# Patient Record
Sex: FEMALE | Race: WHITE | NOT HISPANIC OR LATINO
[De-identification: names, ages, dates, MRNs, and addresses within clinical notes are randomized per-mention and may not be internally consistent; named-entity substitution may affect disease eponyms.]

---

## 2017-08-16 ENCOUNTER — RX RENEWAL (OUTPATIENT)
Age: 72
End: 2017-08-16

## 2018-03-27 ENCOUNTER — APPOINTMENT (OUTPATIENT)
Dept: NEUROLOGY | Facility: CLINIC | Age: 73
End: 2018-03-27
Payer: MEDICARE

## 2018-03-27 VITALS
BODY MASS INDEX: 36.73 KG/M2 | SYSTOLIC BLOOD PRESSURE: 159 MMHG | DIASTOLIC BLOOD PRESSURE: 94 MMHG | HEART RATE: 87 BPM | HEIGHT: 58 IN | WEIGHT: 175 LBS

## 2018-03-27 DIAGNOSIS — Z81.8 FAMILY HISTORY OF OTHER MENTAL AND BEHAVIORAL DISORDERS: ICD-10-CM

## 2018-03-27 PROCEDURE — 99205 OFFICE O/P NEW HI 60 MIN: CPT

## 2018-03-27 RX ORDER — BLOOD SUGAR DIAGNOSTIC
STRIP MISCELLANEOUS
Qty: 100 | Refills: 0 | Status: COMPLETED | COMMUNITY
Start: 2018-02-05

## 2018-03-27 RX ORDER — LANCETS 33 GAUGE
EACH MISCELLANEOUS
Qty: 100 | Refills: 0 | Status: COMPLETED | COMMUNITY
Start: 2018-02-05

## 2018-04-03 ENCOUNTER — FORM ENCOUNTER (OUTPATIENT)
Age: 73
End: 2018-04-03

## 2018-04-04 ENCOUNTER — APPOINTMENT (OUTPATIENT)
Dept: MRI IMAGING | Facility: CLINIC | Age: 73
End: 2018-04-04

## 2018-04-04 ENCOUNTER — OUTPATIENT (OUTPATIENT)
Dept: OUTPATIENT SERVICES | Facility: HOSPITAL | Age: 73
LOS: 1 days | End: 2018-04-04
Payer: MEDICARE

## 2018-04-04 DIAGNOSIS — F32.9 MAJOR DEPRESSIVE DISORDER, SINGLE EPISODE, UNSPECIFIED: ICD-10-CM

## 2018-04-04 DIAGNOSIS — G31.84 MILD COGNITIVE IMPAIRMENT OF UNCERTAIN OR UNKNOWN ETIOLOGY: ICD-10-CM

## 2018-04-04 PROCEDURE — 70551 MRI BRAIN STEM W/O DYE: CPT

## 2018-04-04 PROCEDURE — 70551 MRI BRAIN STEM W/O DYE: CPT | Mod: 26

## 2018-04-18 ENCOUNTER — APPOINTMENT (OUTPATIENT)
Dept: NEUROLOGY | Facility: CLINIC | Age: 73
End: 2018-04-18
Payer: MEDICARE

## 2018-04-18 PROCEDURE — 93886 INTRACRANIAL COMPLETE STUDY: CPT

## 2018-04-18 PROCEDURE — 93880 EXTRACRANIAL BILAT STUDY: CPT

## 2018-04-18 PROCEDURE — 93892 TCD EMBOLI DETECT W/O INJ: CPT

## 2018-05-08 ENCOUNTER — APPOINTMENT (OUTPATIENT)
Dept: NEUROLOGY | Facility: CLINIC | Age: 73
End: 2018-05-08
Payer: MEDICARE

## 2018-05-08 PROCEDURE — 96116 NUBHVL XM PHYS/QHP 1ST HR: CPT

## 2018-05-15 ENCOUNTER — APPOINTMENT (OUTPATIENT)
Dept: NEUROLOGY | Facility: CLINIC | Age: 73
End: 2018-05-15
Payer: MEDICARE

## 2018-05-15 PROCEDURE — 96118: CPT

## 2018-05-15 PROCEDURE — 96119: CPT | Mod: 59,NC

## 2018-05-25 ENCOUNTER — APPOINTMENT (OUTPATIENT)
Dept: NEUROLOGY | Facility: CLINIC | Age: 73
End: 2018-05-25

## 2018-06-29 ENCOUNTER — APPOINTMENT (OUTPATIENT)
Dept: NEUROLOGY | Facility: CLINIC | Age: 73
End: 2018-06-29
Payer: MEDICARE

## 2018-06-29 PROCEDURE — 96118: CPT

## 2018-07-23 ENCOUNTER — APPOINTMENT (OUTPATIENT)
Dept: NEUROLOGY | Facility: CLINIC | Age: 73
End: 2018-07-23
Payer: MEDICARE

## 2018-07-23 VITALS
HEART RATE: 93 BPM | HEIGHT: 60 IN | SYSTOLIC BLOOD PRESSURE: 150 MMHG | WEIGHT: 293 LBS | DIASTOLIC BLOOD PRESSURE: 75 MMHG | BODY MASS INDEX: 57.52 KG/M2

## 2018-07-23 PROCEDURE — 99214 OFFICE O/P EST MOD 30 MIN: CPT

## 2018-10-04 ENCOUNTER — RX RENEWAL (OUTPATIENT)
Age: 73
End: 2018-10-04

## 2018-10-22 ENCOUNTER — RX RENEWAL (OUTPATIENT)
Age: 73
End: 2018-10-22

## 2018-11-13 DIAGNOSIS — R55 SYNCOPE AND COLLAPSE: ICD-10-CM

## 2018-11-16 ENCOUNTER — APPOINTMENT (OUTPATIENT)
Dept: GERIATRICS | Facility: CLINIC | Age: 73
End: 2018-11-16
Payer: MEDICARE

## 2018-11-16 VITALS
DIASTOLIC BLOOD PRESSURE: 70 MMHG | OXYGEN SATURATION: 97 % | WEIGHT: 168 LBS | HEART RATE: 88 BPM | RESPIRATION RATE: 18 BRPM | BODY MASS INDEX: 32.98 KG/M2 | HEIGHT: 60 IN | SYSTOLIC BLOOD PRESSURE: 130 MMHG

## 2018-11-16 DIAGNOSIS — Z60.2 PROBLEMS RELATED TO LIVING ALONE: ICD-10-CM

## 2018-11-16 DIAGNOSIS — Z86.59 PERSONAL HISTORY OF OTHER MENTAL AND BEHAVIORAL DISORDERS: ICD-10-CM

## 2018-11-16 PROCEDURE — 99205 OFFICE O/P NEW HI 60 MIN: CPT | Mod: GC

## 2018-11-16 SDOH — SOCIAL STABILITY - SOCIAL INSECURITY: PROBLEMS RELATED TO LIVING ALONE: Z60.2

## 2018-11-19 LAB
ALBUMIN SERPL ELPH-MCNC: 4.4 G/DL
ALP BLD-CCNC: 77 U/L
ALT SERPL-CCNC: 5 U/L
ANION GAP SERPL CALC-SCNC: 17 MMOL/L
AST SERPL-CCNC: 11 U/L
BASOPHILS # BLD AUTO: 0.04 K/UL
BASOPHILS NFR BLD AUTO: 0.3 %
BILIRUB SERPL-MCNC: 0.2 MG/DL
BUN SERPL-MCNC: 29 MG/DL
CALCIUM SERPL-MCNC: 10 MG/DL
CHLORIDE SERPL-SCNC: 99 MMOL/L
CHOLEST SERPL-MCNC: 195 MG/DL
CHOLEST/HDLC SERPL: 3.8 RATIO
CO2 SERPL-SCNC: 23 MMOL/L
CREAT SERPL-MCNC: 1.09 MG/DL
EOSINOPHIL # BLD AUTO: 0.27 K/UL
EOSINOPHIL NFR BLD AUTO: 2.2 %
GLUCOSE SERPL-MCNC: 148 MG/DL
HBA1C MFR BLD HPLC: 7.2 %
HCT VFR BLD CALC: 36.4 %
HDLC SERPL-MCNC: 51 MG/DL
HGB BLD-MCNC: 11.8 G/DL
IMM GRANULOCYTES NFR BLD AUTO: 0.2 %
LDLC SERPL CALC-MCNC: 103 MG/DL
LYMPHOCYTES # BLD AUTO: 2.61 K/UL
LYMPHOCYTES NFR BLD AUTO: 21.6 %
MAN DIFF?: NORMAL
MCHC RBC-ENTMCNC: 28.9 PG
MCHC RBC-ENTMCNC: 32.4 GM/DL
MCV RBC AUTO: 89.2 FL
MONOCYTES # BLD AUTO: 1.03 K/UL
MONOCYTES NFR BLD AUTO: 8.5 %
NEUTROPHILS # BLD AUTO: 8.1 K/UL
NEUTROPHILS NFR BLD AUTO: 67.2 %
PLATELET # BLD AUTO: 395 K/UL
POTASSIUM SERPL-SCNC: 4.6 MMOL/L
PROT SERPL-MCNC: 7.8 G/DL
RBC # BLD: 4.08 M/UL
RBC # FLD: 13.3 %
SODIUM SERPL-SCNC: 139 MMOL/L
TRIGL SERPL-MCNC: 205 MG/DL
TSH SERPL-ACNC: 2.12 UIU/ML
WBC # FLD AUTO: 12.07 K/UL

## 2018-11-26 ENCOUNTER — RX RENEWAL (OUTPATIENT)
Age: 73
End: 2018-11-26

## 2018-12-10 ENCOUNTER — INPATIENT (INPATIENT)
Facility: HOSPITAL | Age: 73
LOS: 2 days | Discharge: NURSING, REHAB OR LONG-TERM CARE | End: 2018-12-13
Attending: HOSPITALIST | Admitting: HOSPITALIST
Payer: MEDICARE

## 2018-12-10 VITALS
SYSTOLIC BLOOD PRESSURE: 100 MMHG | DIASTOLIC BLOOD PRESSURE: 56 MMHG | RESPIRATION RATE: 16 BRPM | HEART RATE: 94 BPM | TEMPERATURE: 97 F | OXYGEN SATURATION: 96 %

## 2018-12-10 DIAGNOSIS — N17.9 ACUTE KIDNEY FAILURE, UNSPECIFIED: ICD-10-CM

## 2018-12-10 LAB
ALBUMIN SERPL ELPH-MCNC: 3.4 G/DL — SIGNIFICANT CHANGE UP (ref 3.3–5)
ALP SERPL-CCNC: 99 U/L — SIGNIFICANT CHANGE UP (ref 40–120)
ALT FLD-CCNC: 8 U/L — SIGNIFICANT CHANGE UP (ref 4–33)
ANISOCYTOSIS BLD QL: SLIGHT — SIGNIFICANT CHANGE UP
APPEARANCE UR: SIGNIFICANT CHANGE UP
AST SERPL-CCNC: 13 U/L — SIGNIFICANT CHANGE UP (ref 4–32)
BACTERIA # UR AUTO: NEGATIVE — SIGNIFICANT CHANGE UP
BASE EXCESS BLDV CALC-SCNC: 1.7 MMOL/L — SIGNIFICANT CHANGE UP
BASOPHILS # BLD AUTO: 0.05 K/UL — SIGNIFICANT CHANGE UP (ref 0–0.2)
BASOPHILS NFR BLD AUTO: 0.2 % — SIGNIFICANT CHANGE UP (ref 0–2)
BASOPHILS NFR SPEC: 0 % — SIGNIFICANT CHANGE UP (ref 0–2)
BILIRUB SERPL-MCNC: 0.4 MG/DL — SIGNIFICANT CHANGE UP (ref 0.2–1.2)
BILIRUB UR-MCNC: SIGNIFICANT CHANGE UP
BLASTS # FLD: 0 % — SIGNIFICANT CHANGE UP (ref 0–0)
BLOOD GAS VENOUS - CREATININE: 1.85 MG/DL — HIGH (ref 0.5–1.3)
BLOOD UR QL VISUAL: SIGNIFICANT CHANGE UP
BUN SERPL-MCNC: 65 MG/DL — HIGH (ref 7–23)
CALCIUM SERPL-MCNC: 9.8 MG/DL — SIGNIFICANT CHANGE UP (ref 8.4–10.5)
CHLORIDE BLDV-SCNC: 97 MMOL/L — SIGNIFICANT CHANGE UP (ref 96–108)
CHLORIDE SERPL-SCNC: 90 MMOL/L — LOW (ref 98–107)
CO2 SERPL-SCNC: 23 MMOL/L — SIGNIFICANT CHANGE UP (ref 22–31)
COLOR SPEC: YELLOW — SIGNIFICANT CHANGE UP
CREAT SERPL-MCNC: 1.8 MG/DL — HIGH (ref 0.5–1.3)
EOSINOPHIL # BLD AUTO: 0 K/UL — SIGNIFICANT CHANGE UP (ref 0–0.5)
EOSINOPHIL NFR BLD AUTO: 0 % — SIGNIFICANT CHANGE UP (ref 0–6)
EOSINOPHIL NFR FLD: 0 % — SIGNIFICANT CHANGE UP (ref 0–6)
GAS PNL BLDV: 133 MMOL/L — LOW (ref 136–146)
GLUCOSE BLDV-MCNC: 239 — HIGH (ref 70–99)
GLUCOSE SERPL-MCNC: 251 MG/DL — HIGH (ref 70–99)
GLUCOSE UR-MCNC: 30 — SIGNIFICANT CHANGE UP
HCO3 BLDV-SCNC: 24 MMOL/L — SIGNIFICANT CHANGE UP (ref 20–27)
HCT VFR BLD CALC: 38.7 % — SIGNIFICANT CHANGE UP (ref 34.5–45)
HCT VFR BLDV CALC: 34.6 % — SIGNIFICANT CHANGE UP (ref 34.5–45)
HGB BLD-MCNC: 12.6 G/DL — SIGNIFICANT CHANGE UP (ref 11.5–15.5)
HGB BLDV-MCNC: 11.2 G/DL — LOW (ref 11.5–15.5)
HYALINE CASTS # UR AUTO: HIGH
HYPOCHROMIA BLD QL: SLIGHT — SIGNIFICANT CHANGE UP
IMM GRANULOCYTES # BLD AUTO: 0.24 # — SIGNIFICANT CHANGE UP
IMM GRANULOCYTES NFR BLD AUTO: 1 % — SIGNIFICANT CHANGE UP (ref 0–1.5)
KETONES UR-MCNC: NEGATIVE — SIGNIFICANT CHANGE UP
LACTATE BLDV-MCNC: 2.1 MMOL/L — HIGH (ref 0.5–2)
LEUKOCYTE ESTERASE UR-ACNC: SIGNIFICANT CHANGE UP
LYMPHOCYTES # BLD AUTO: 1.29 K/UL — SIGNIFICANT CHANGE UP (ref 1–3.3)
LYMPHOCYTES # BLD AUTO: 5.3 % — LOW (ref 13–44)
LYMPHOCYTES NFR SPEC AUTO: 5.2 % — LOW (ref 13–44)
MCHC RBC-ENTMCNC: 29.2 PG — SIGNIFICANT CHANGE UP (ref 27–34)
MCHC RBC-ENTMCNC: 32.6 % — SIGNIFICANT CHANGE UP (ref 32–36)
MCV RBC AUTO: 89.6 FL — SIGNIFICANT CHANGE UP (ref 80–100)
METAMYELOCYTES # FLD: 0 % — SIGNIFICANT CHANGE UP (ref 0–1)
MONOCYTES # BLD AUTO: 1.7 K/UL — HIGH (ref 0–0.9)
MONOCYTES NFR BLD AUTO: 7 % — SIGNIFICANT CHANGE UP (ref 2–14)
MONOCYTES NFR BLD: 7.7 % — SIGNIFICANT CHANGE UP (ref 2–9)
MYELOCYTES NFR BLD: 0 % — SIGNIFICANT CHANGE UP (ref 0–0)
NEUTROPHIL AB SER-ACNC: 86.2 % — HIGH (ref 43–77)
NEUTROPHILS # BLD AUTO: 21.17 K/UL — HIGH (ref 1.8–7.4)
NEUTROPHILS NFR BLD AUTO: 86.5 % — HIGH (ref 43–77)
NEUTS BAND # BLD: 0.9 % — SIGNIFICANT CHANGE UP (ref 0–6)
NITRITE UR-MCNC: NEGATIVE — SIGNIFICANT CHANGE UP
NRBC # FLD: 0 — SIGNIFICANT CHANGE UP
OTHER - HEMATOLOGY %: 0 — SIGNIFICANT CHANGE UP
PCO2 BLDV: 49 MMHG — SIGNIFICANT CHANGE UP (ref 41–51)
PH BLDV: 7.36 PH — SIGNIFICANT CHANGE UP (ref 7.32–7.43)
PH UR: 5.5 — SIGNIFICANT CHANGE UP (ref 5–8)
PLATELET # BLD AUTO: 262 K/UL — SIGNIFICANT CHANGE UP (ref 150–400)
PLATELET COUNT - ESTIMATE: NORMAL — SIGNIFICANT CHANGE UP
PMV BLD: 11 FL — SIGNIFICANT CHANGE UP (ref 7–13)
PO2 BLDV: 28 MMHG — LOW (ref 35–40)
POLYCHROMASIA BLD QL SMEAR: SLIGHT — SIGNIFICANT CHANGE UP
POTASSIUM BLDV-SCNC: 3.8 MMOL/L — SIGNIFICANT CHANGE UP (ref 3.4–4.5)
POTASSIUM SERPL-MCNC: 4.6 MMOL/L — SIGNIFICANT CHANGE UP (ref 3.5–5.3)
POTASSIUM SERPL-SCNC: 4.6 MMOL/L — SIGNIFICANT CHANGE UP (ref 3.5–5.3)
PROMYELOCYTES # FLD: 0 % — SIGNIFICANT CHANGE UP (ref 0–0)
PROT SERPL-MCNC: 8.2 G/DL — SIGNIFICANT CHANGE UP (ref 6–8.3)
PROT UR-MCNC: 100 — HIGH
RBC # BLD: 4.32 M/UL — SIGNIFICANT CHANGE UP (ref 3.8–5.2)
RBC # FLD: 12.7 % — SIGNIFICANT CHANGE UP (ref 10.3–14.5)
RBC CASTS # UR COMP ASSIST: SIGNIFICANT CHANGE UP (ref 0–?)
SAO2 % BLDV: 39.3 % — LOW (ref 60–85)
SODIUM SERPL-SCNC: 134 MMOL/L — LOW (ref 135–145)
SP GR SPEC: 1.02 — SIGNIFICANT CHANGE UP (ref 1–1.04)
SQUAMOUS # UR AUTO: SIGNIFICANT CHANGE UP
UROBILINOGEN FLD QL: SIGNIFICANT CHANGE UP
VARIANT LYMPHS # BLD: 0 % — SIGNIFICANT CHANGE UP
WBC # BLD: 24.45 K/UL — HIGH (ref 3.8–10.5)
WBC # FLD AUTO: 24.45 K/UL — HIGH (ref 3.8–10.5)
WBC UR QL: HIGH (ref 0–?)

## 2018-12-10 PROCEDURE — 73600 X-RAY EXAM OF ANKLE: CPT | Mod: 26,RT

## 2018-12-10 PROCEDURE — 71045 X-RAY EXAM CHEST 1 VIEW: CPT | Mod: 26

## 2018-12-10 PROCEDURE — 73564 X-RAY EXAM KNEE 4 OR MORE: CPT | Mod: 26,RT

## 2018-12-10 RX ORDER — CEFTRIAXONE 500 MG/1
1 INJECTION, POWDER, FOR SOLUTION INTRAMUSCULAR; INTRAVENOUS ONCE
Qty: 0 | Refills: 0 | Status: DISCONTINUED | OUTPATIENT
Start: 2018-12-10 | End: 2018-12-10

## 2018-12-10 RX ORDER — SODIUM CHLORIDE 9 MG/ML
1000 INJECTION INTRAMUSCULAR; INTRAVENOUS; SUBCUTANEOUS ONCE
Qty: 0 | Refills: 0 | Status: COMPLETED | OUTPATIENT
Start: 2018-12-10 | End: 2018-12-10

## 2018-12-10 RX ORDER — CEFTRIAXONE 500 MG/1
1 INJECTION, POWDER, FOR SOLUTION INTRAMUSCULAR; INTRAVENOUS ONCE
Qty: 0 | Refills: 0 | Status: COMPLETED | OUTPATIENT
Start: 2018-12-10 | End: 2018-12-10

## 2018-12-10 RX ORDER — IBUPROFEN 200 MG
600 TABLET ORAL ONCE
Qty: 0 | Refills: 0 | Status: COMPLETED | OUTPATIENT
Start: 2018-12-10 | End: 2018-12-10

## 2018-12-10 RX ADMIN — SODIUM CHLORIDE 1000 MILLILITER(S): 9 INJECTION INTRAMUSCULAR; INTRAVENOUS; SUBCUTANEOUS at 21:15

## 2018-12-10 RX ADMIN — Medication 600 MILLIGRAM(S): at 20:20

## 2018-12-10 RX ADMIN — CEFTRIAXONE 100 GRAM(S): 500 INJECTION, POWDER, FOR SOLUTION INTRAMUSCULAR; INTRAVENOUS at 22:57

## 2018-12-10 RX ADMIN — Medication 600 MILLIGRAM(S): at 21:15

## 2018-12-10 NOTE — ED PROVIDER NOTE - CARE PLAN
Principal Discharge DX:	ZAHRAA (acute kidney injury) Principal Discharge DX:	ZAHRAA (acute kidney injury)  Secondary Diagnosis:	Leukocytosis

## 2018-12-10 NOTE — ED PROVIDER NOTE - OBJECTIVE STATEMENT
73 year old female with pmhx of arthritis alzheimer's HTN hypothyroidism presenting with 1 day of fatigue and difficultly walking. Per son, states that today he found patient on couch and was not able to get up due to "weakness". When he helped patient to bathroom, she states that she felt as if she was "going to faint". She denies any LOC. Also endorsing right knee pain and swelling 73 year old female with pmhx of arthritis alzheimer's HTN hypothyroidism presenting with 1 day of fatigue and difficultly walking. Per son, states that today he found patient on couch and was not able to get up due to "weakness". When he helped patient to bathroom, she states that she felt as if she was "going to faint". She denies any LOC. Also endorsing right knee and ankle pain with new recent swelling. At home she normally ambulates unassisted but recently has been having difficulty due to pain and fatigue. Son states she has not been compliant with her medications for the past few days.     1 mo ago had a possible syncopal episode for 20 seconds while sitting down where she returned to baseline without any postictal state.    Denies fevers, abd pain, CP, SOB, LOC

## 2018-12-10 NOTE — ED ADULT NURSE NOTE - OBJECTIVE STATEMENT
74 yo female BIBEMS for 10/10 right foot/knee pain and increasing weakness since this morning.  Pt is alert and oriented to time, place. but not situation.  Respirations are even and unlabored, nonambulatory upon arrival.  At home, is capable of performing ADL's.  Right knee appears swollen and tender to touch, son states she "cannot bear any weight and has had joint pain like this before." PMhx alzheimer's, HTN, DM, asthma, and anxiety.

## 2018-12-10 NOTE — ED ADULT NURSE REASSESSMENT NOTE - NS ED NURSE REASSESS COMMENT FT1
Report received from day RN. Pt resting comfortably in bed, states pain is tolerable at this time, VSS, pt stable, will continue to monitor.

## 2018-12-10 NOTE — ED PROVIDER NOTE - PROGRESS NOTE DETAILS
Suzi Vu PGY1  Ua neg, CXR neg, has elevated WBC. Has ZAHRAA  Spoke to Hospitalist who agreed to admit for evaluation

## 2018-12-10 NOTE — ED PROVIDER NOTE - MEDICAL DECISION MAKING DETAILS
73 year old female with pmhx hypothyroid presenting with 1 day of difficulty ambulating and progressive fatigue. Possible 2/2 infection vs joint effusions. Will get basic labs, urine studies, xrays of joints, and motrin for pain

## 2018-12-10 NOTE — ED PROVIDER NOTE - ATTENDING CONTRIBUTION TO CARE
73F p/w RLE pain and swelling x 1 day, having trouble walking.  Lives alone.  Has happened before, tried motrin, didn’t help so came in.  R knee anteriorly swollen, not hot or swollen.  Plan rx tylenol, check labs and urine, xrays, cane, SW eval, reassess.  Can escalate to ibuprofen if Cr OK, then perhaps oxycodone, likely worsening arthritis.    VS:  unremarkable    GEN - mild distress R knee pain; A+O x3 Smells of urine.  HEAD - NC/AT     ENT - PEERL, EOMI, mucous membranes  moist , no discharge      NECK: Neck supple, non-tender without lymphadenopathy, no masses, no JVD  PULM - CTA b/l,  symmetric breath sounds  COR -  normal heart sounds    ABD - , ND, NT, soft,  BACK - no CVA tenderness, nontender spine     EXTREMS - no edema, no deformity, warm and well perfused  except for R knee mild diffuse swelling, mild diffuse ttp, no warmth, somewhat painful ROM, R ankle mild swelling and ttp, no calf or shin swelling or ttp.   Distal N/V/T intact.    SKIN - no rash or bruising      NEUROLOGIC - alert, CN 2-12 intact, sensation nl, motor no focal deficit.

## 2018-12-11 DIAGNOSIS — M11.9 CRYSTAL ARTHROPATHY, UNSPECIFIED: ICD-10-CM

## 2018-12-11 DIAGNOSIS — E11.9 TYPE 2 DIABETES MELLITUS WITHOUT COMPLICATIONS: ICD-10-CM

## 2018-12-11 DIAGNOSIS — E11.65 TYPE 2 DIABETES MELLITUS WITH HYPERGLYCEMIA: ICD-10-CM

## 2018-12-11 DIAGNOSIS — R52 PAIN, UNSPECIFIED: ICD-10-CM

## 2018-12-11 DIAGNOSIS — R32 UNSPECIFIED URINARY INCONTINENCE: ICD-10-CM

## 2018-12-11 DIAGNOSIS — M25.561 PAIN IN RIGHT KNEE: ICD-10-CM

## 2018-12-11 DIAGNOSIS — D72.829 ELEVATED WHITE BLOOD CELL COUNT, UNSPECIFIED: ICD-10-CM

## 2018-12-11 DIAGNOSIS — Z90.49 ACQUIRED ABSENCE OF OTHER SPECIFIED PARTS OF DIGESTIVE TRACT: Chronic | ICD-10-CM

## 2018-12-11 DIAGNOSIS — I10 ESSENTIAL (PRIMARY) HYPERTENSION: ICD-10-CM

## 2018-12-11 DIAGNOSIS — A41.9 SEPSIS, UNSPECIFIED ORGANISM: ICD-10-CM

## 2018-12-11 DIAGNOSIS — E03.9 HYPOTHYROIDISM, UNSPECIFIED: ICD-10-CM

## 2018-12-11 DIAGNOSIS — G30.1 ALZHEIMER'S DISEASE WITH LATE ONSET: ICD-10-CM

## 2018-12-11 DIAGNOSIS — N17.9 ACUTE KIDNEY FAILURE, UNSPECIFIED: ICD-10-CM

## 2018-12-11 DIAGNOSIS — Z29.9 ENCOUNTER FOR PROPHYLACTIC MEASURES, UNSPECIFIED: ICD-10-CM

## 2018-12-11 LAB
APTT BLD: 24 SEC — LOW (ref 27.5–36.3)
BASOPHILS # BLD AUTO: 0.03 K/UL — SIGNIFICANT CHANGE UP (ref 0–0.2)
BASOPHILS NFR BLD AUTO: 0.2 % — SIGNIFICANT CHANGE UP (ref 0–2)
BODY FLUID TYPE: SIGNIFICANT CHANGE UP
CLARITY SPEC: SIGNIFICANT CHANGE UP
COLOR FLD: SIGNIFICANT CHANGE UP
CRP SERPL-MCNC: 233 MG/L — HIGH
CRYSTALS FLD MICRO: PRESENT — SIGNIFICANT CHANGE UP
EOSINOPHIL # BLD AUTO: 0.01 K/UL — SIGNIFICANT CHANGE UP (ref 0–0.5)
EOSINOPHIL NFR BLD AUTO: 0.1 % — SIGNIFICANT CHANGE UP (ref 0–6)
ERYTHROCYTE [SEDIMENTATION RATE] IN BLOOD: 108 MM/HR — HIGH (ref 4–25)
GRAM STN FLD: SIGNIFICANT CHANGE UP
HCT VFR BLD CALC: 31.3 % — LOW (ref 34.5–45)
HGB BLD-MCNC: 10.2 G/DL — LOW (ref 11.5–15.5)
IMM GRANULOCYTES # BLD AUTO: 0.12 # — SIGNIFICANT CHANGE UP
IMM GRANULOCYTES NFR BLD AUTO: 0.7 % — SIGNIFICANT CHANGE UP (ref 0–1.5)
INR BLD: 1.1 — SIGNIFICANT CHANGE UP (ref 0.88–1.17)
LACTATE BLDV-MCNC: 2.3 MMOL/L — HIGH (ref 0.5–2)
LYMPHOCYTES # BLD AUTO: 1.97 K/UL — SIGNIFICANT CHANGE UP (ref 1–3.3)
LYMPHOCYTES # BLD AUTO: 11.7 % — LOW (ref 13–44)
MACROPHAGES # FLD: 9 % — SIGNIFICANT CHANGE UP
MAGNESIUM SERPL-MCNC: 2.1 MG/DL — SIGNIFICANT CHANGE UP (ref 1.6–2.6)
MCHC RBC-ENTMCNC: 29.3 PG — SIGNIFICANT CHANGE UP (ref 27–34)
MCHC RBC-ENTMCNC: 32.6 % — SIGNIFICANT CHANGE UP (ref 32–36)
MCV RBC AUTO: 89.9 FL — SIGNIFICANT CHANGE UP (ref 80–100)
MONOCYTES # BLD AUTO: 1.12 K/UL — HIGH (ref 0–0.9)
MONOCYTES NFR BLD AUTO: 6.7 % — SIGNIFICANT CHANGE UP (ref 2–14)
NEUTROPHILS # BLD AUTO: 13.59 K/UL — HIGH (ref 1.8–7.4)
NEUTROPHILS NFR BLD AUTO: 80.6 % — HIGH (ref 43–77)
NEUTS SEG NFR FLD MANUAL: 91 % — SIGNIFICANT CHANGE UP
NRBC # FLD: 0 — SIGNIFICANT CHANGE UP
PHOSPHATE SERPL-MCNC: 3.3 MG/DL — SIGNIFICANT CHANGE UP (ref 2.5–4.5)
PLATELET # BLD AUTO: 272 K/UL — SIGNIFICANT CHANGE UP (ref 150–400)
PMV BLD: 10.9 FL — SIGNIFICANT CHANGE UP (ref 7–13)
PROCALCITONIN SERPL-MCNC: 1.79 NG/ML — HIGH (ref 0.02–0.1)
PROTHROM AB SERPL-ACNC: 12.3 SEC — SIGNIFICANT CHANGE UP (ref 9.8–13.1)
RBC # BLD: 3.48 M/UL — LOW (ref 3.8–5.2)
RBC # FLD: 12.9 % — SIGNIFICANT CHANGE UP (ref 10.3–14.5)
RCV VOL RI: HIGH CELL/UL (ref 0–5)
SPECIMEN SOURCE: SIGNIFICANT CHANGE UP
TOTAL CELLS COUNTED, BODY FLUID: 100 CELLS — SIGNIFICANT CHANGE UP
TOTAL NUCLEATED CELL COUNT, BODY FLUID: HIGH CELL/UL (ref 0–5)
TSH SERPL-MCNC: 1.79 UIU/ML — SIGNIFICANT CHANGE UP (ref 0.27–4.2)
URATE SERPL-MCNC: 11.9 MG/DL — HIGH (ref 2.5–7)
WBC # BLD: 16.84 K/UL — HIGH (ref 3.8–10.5)
WBC # FLD AUTO: 16.84 K/UL — HIGH (ref 3.8–10.5)

## 2018-12-11 PROCEDURE — 99223 1ST HOSP IP/OBS HIGH 75: CPT | Mod: GC

## 2018-12-11 PROCEDURE — 12345: CPT | Mod: NC,GC

## 2018-12-11 PROCEDURE — 70450 CT HEAD/BRAIN W/O DYE: CPT | Mod: 26

## 2018-12-11 RX ORDER — AZTREONAM 2 G
VIAL (EA) INJECTION
Qty: 0 | Refills: 0 | Status: DISCONTINUED | OUTPATIENT
Start: 2018-12-11 | End: 2018-12-11

## 2018-12-11 RX ORDER — DEXTROSE 50 % IN WATER 50 %
25 SYRINGE (ML) INTRAVENOUS ONCE
Qty: 0 | Refills: 0 | Status: DISCONTINUED | OUTPATIENT
Start: 2018-12-11 | End: 2018-12-11

## 2018-12-11 RX ORDER — DONEPEZIL HYDROCHLORIDE 10 MG/1
10 TABLET, FILM COATED ORAL AT BEDTIME
Qty: 0 | Refills: 0 | Status: DISCONTINUED | OUTPATIENT
Start: 2018-12-11 | End: 2018-12-11

## 2018-12-11 RX ORDER — INSULIN LISPRO 100/ML
VIAL (ML) SUBCUTANEOUS
Qty: 0 | Refills: 0 | Status: DISCONTINUED | OUTPATIENT
Start: 2018-12-11 | End: 2018-12-11

## 2018-12-11 RX ORDER — AZTREONAM 2 G
500 VIAL (EA) INJECTION ONCE
Qty: 0 | Refills: 0 | Status: COMPLETED | OUTPATIENT
Start: 2018-12-11 | End: 2018-12-11

## 2018-12-11 RX ORDER — LIDOCAINE 4 G/100G
1 CREAM TOPICAL DAILY
Qty: 0 | Refills: 0 | Status: DISCONTINUED | OUTPATIENT
Start: 2018-12-11 | End: 2018-12-13

## 2018-12-11 RX ORDER — LEVOTHYROXINE SODIUM 125 MCG
75 TABLET ORAL DAILY
Qty: 0 | Refills: 0 | Status: DISCONTINUED | OUTPATIENT
Start: 2018-12-11 | End: 2018-12-13

## 2018-12-11 RX ORDER — DEXTROSE 50 % IN WATER 50 %
15 SYRINGE (ML) INTRAVENOUS ONCE
Qty: 0 | Refills: 0 | Status: DISCONTINUED | OUTPATIENT
Start: 2018-12-11 | End: 2018-12-13

## 2018-12-11 RX ORDER — LIDOCAINE 4 G/100G
2 CREAM TOPICAL DAILY
Qty: 0 | Refills: 0 | Status: DISCONTINUED | OUTPATIENT
Start: 2018-12-11 | End: 2018-12-13

## 2018-12-11 RX ORDER — SODIUM CHLORIDE 9 MG/ML
1000 INJECTION, SOLUTION INTRAVENOUS
Qty: 0 | Refills: 0 | Status: DISCONTINUED | OUTPATIENT
Start: 2018-12-11 | End: 2018-12-11

## 2018-12-11 RX ORDER — SODIUM CHLORIDE 9 MG/ML
1000 INJECTION, SOLUTION INTRAVENOUS
Qty: 0 | Refills: 0 | Status: DISCONTINUED | OUTPATIENT
Start: 2018-12-11 | End: 2018-12-13

## 2018-12-11 RX ORDER — DONEPEZIL HYDROCHLORIDE 10 MG/1
10 TABLET, FILM COATED ORAL AT BEDTIME
Qty: 0 | Refills: 0 | Status: DISCONTINUED | OUTPATIENT
Start: 2018-12-11 | End: 2018-12-13

## 2018-12-11 RX ORDER — DEXTROSE 50 % IN WATER 50 %
15 SYRINGE (ML) INTRAVENOUS ONCE
Qty: 0 | Refills: 0 | Status: DISCONTINUED | OUTPATIENT
Start: 2018-12-11 | End: 2018-12-11

## 2018-12-11 RX ORDER — DEXTROSE 50 % IN WATER 50 %
12.5 SYRINGE (ML) INTRAVENOUS ONCE
Qty: 0 | Refills: 0 | Status: DISCONTINUED | OUTPATIENT
Start: 2018-12-11 | End: 2018-12-11

## 2018-12-11 RX ORDER — SIMVASTATIN 20 MG/1
40 TABLET, FILM COATED ORAL AT BEDTIME
Qty: 0 | Refills: 0 | Status: DISCONTINUED | OUTPATIENT
Start: 2018-12-11 | End: 2018-12-13

## 2018-12-11 RX ORDER — INSULIN LISPRO 100/ML
VIAL (ML) SUBCUTANEOUS AT BEDTIME
Qty: 0 | Refills: 0 | Status: DISCONTINUED | OUTPATIENT
Start: 2018-12-11 | End: 2018-12-13

## 2018-12-11 RX ORDER — HEPARIN SODIUM 5000 [USP'U]/ML
5000 INJECTION INTRAVENOUS; SUBCUTANEOUS EVERY 8 HOURS
Qty: 0 | Refills: 0 | Status: DISCONTINUED | OUTPATIENT
Start: 2018-12-11 | End: 2018-12-13

## 2018-12-11 RX ORDER — MIRTAZAPINE 45 MG/1
15 TABLET, ORALLY DISINTEGRATING ORAL AT BEDTIME
Qty: 0 | Refills: 0 | Status: DISCONTINUED | OUTPATIENT
Start: 2018-12-11 | End: 2018-12-11

## 2018-12-11 RX ORDER — GLUCAGON INJECTION, SOLUTION 0.5 MG/.1ML
1 INJECTION, SOLUTION SUBCUTANEOUS ONCE
Qty: 0 | Refills: 0 | Status: DISCONTINUED | OUTPATIENT
Start: 2018-12-11 | End: 2018-12-13

## 2018-12-11 RX ORDER — MIRTAZAPINE 45 MG/1
15 TABLET, ORALLY DISINTEGRATING ORAL AT BEDTIME
Qty: 0 | Refills: 0 | Status: DISCONTINUED | OUTPATIENT
Start: 2018-12-11 | End: 2018-12-13

## 2018-12-11 RX ORDER — DEXTROSE 50 % IN WATER 50 %
25 SYRINGE (ML) INTRAVENOUS ONCE
Qty: 0 | Refills: 0 | Status: DISCONTINUED | OUTPATIENT
Start: 2018-12-11 | End: 2018-12-13

## 2018-12-11 RX ORDER — QUETIAPINE FUMARATE 200 MG/1
25 TABLET, FILM COATED ORAL DAILY
Qty: 0 | Refills: 0 | Status: DISCONTINUED | OUTPATIENT
Start: 2018-12-11 | End: 2018-12-13

## 2018-12-11 RX ORDER — INSULIN LISPRO 100/ML
VIAL (ML) SUBCUTANEOUS AT BEDTIME
Qty: 0 | Refills: 0 | Status: DISCONTINUED | OUTPATIENT
Start: 2018-12-11 | End: 2018-12-11

## 2018-12-11 RX ORDER — OXYCODONE AND ACETAMINOPHEN 5; 325 MG/1; MG/1
1 TABLET ORAL EVERY 6 HOURS
Qty: 0 | Refills: 0 | Status: DISCONTINUED | OUTPATIENT
Start: 2018-12-11 | End: 2018-12-13

## 2018-12-11 RX ORDER — DEXTROSE 50 % IN WATER 50 %
12.5 SYRINGE (ML) INTRAVENOUS ONCE
Qty: 0 | Refills: 0 | Status: DISCONTINUED | OUTPATIENT
Start: 2018-12-11 | End: 2018-12-13

## 2018-12-11 RX ORDER — GLUCAGON INJECTION, SOLUTION 0.5 MG/.1ML
1 INJECTION, SOLUTION SUBCUTANEOUS ONCE
Qty: 0 | Refills: 0 | Status: DISCONTINUED | OUTPATIENT
Start: 2018-12-11 | End: 2018-12-11

## 2018-12-11 RX ORDER — INSULIN LISPRO 100/ML
VIAL (ML) SUBCUTANEOUS
Qty: 0 | Refills: 0 | Status: DISCONTINUED | OUTPATIENT
Start: 2018-12-11 | End: 2018-12-13

## 2018-12-11 RX ORDER — AZTREONAM 2 G
500 VIAL (EA) INJECTION EVERY 12 HOURS
Qty: 0 | Refills: 0 | Status: DISCONTINUED | OUTPATIENT
Start: 2018-12-11 | End: 2018-12-11

## 2018-12-11 RX ORDER — ACETAMINOPHEN 500 MG
650 TABLET ORAL EVERY 6 HOURS
Qty: 0 | Refills: 0 | Status: DISCONTINUED | OUTPATIENT
Start: 2018-12-11 | End: 2018-12-13

## 2018-12-11 RX ORDER — POTASSIUM CHLORIDE 20 MEQ
40 PACKET (EA) ORAL ONCE
Qty: 0 | Refills: 0 | Status: COMPLETED | OUTPATIENT
Start: 2018-12-11 | End: 2018-12-11

## 2018-12-11 RX ORDER — SODIUM CHLORIDE 9 MG/ML
1000 INJECTION INTRAMUSCULAR; INTRAVENOUS; SUBCUTANEOUS ONCE
Qty: 0 | Refills: 0 | Status: COMPLETED | OUTPATIENT
Start: 2018-12-11 | End: 2018-12-11

## 2018-12-11 RX ADMIN — Medication 1: at 10:24

## 2018-12-11 RX ADMIN — Medication 50 MILLIGRAM(S): at 07:46

## 2018-12-11 RX ADMIN — SIMVASTATIN 40 MILLIGRAM(S): 20 TABLET, FILM COATED ORAL at 22:07

## 2018-12-11 RX ADMIN — Medication 650 MILLIGRAM(S): at 02:58

## 2018-12-11 RX ADMIN — OXYCODONE AND ACETAMINOPHEN 1 TABLET(S): 5; 325 TABLET ORAL at 14:29

## 2018-12-11 RX ADMIN — HEPARIN SODIUM 5000 UNIT(S): 5000 INJECTION INTRAVENOUS; SUBCUTANEOUS at 13:56

## 2018-12-11 RX ADMIN — HEPARIN SODIUM 5000 UNIT(S): 5000 INJECTION INTRAVENOUS; SUBCUTANEOUS at 05:36

## 2018-12-11 RX ADMIN — SODIUM CHLORIDE 75 MILLILITER(S): 9 INJECTION, SOLUTION INTRAVENOUS at 05:36

## 2018-12-11 RX ADMIN — Medication 1: at 13:54

## 2018-12-11 RX ADMIN — HEPARIN SODIUM 5000 UNIT(S): 5000 INJECTION INTRAVENOUS; SUBCUTANEOUS at 22:08

## 2018-12-11 RX ADMIN — Medication 40 MILLIEQUIVALENT(S): at 05:36

## 2018-12-11 RX ADMIN — Medication 30 MILLIGRAM(S): at 19:18

## 2018-12-11 RX ADMIN — Medication 75 MICROGRAM(S): at 05:36

## 2018-12-11 RX ADMIN — LIDOCAINE 1 PATCH: 4 CREAM TOPICAL at 13:55

## 2018-12-11 RX ADMIN — MIRTAZAPINE 15 MILLIGRAM(S): 45 TABLET, ORALLY DISINTEGRATING ORAL at 22:08

## 2018-12-11 RX ADMIN — DONEPEZIL HYDROCHLORIDE 10 MILLIGRAM(S): 10 TABLET, FILM COATED ORAL at 22:07

## 2018-12-11 RX ADMIN — Medication 650 MILLIGRAM(S): at 03:54

## 2018-12-11 RX ADMIN — QUETIAPINE FUMARATE 25 MILLIGRAM(S): 200 TABLET, FILM COATED ORAL at 13:59

## 2018-12-11 RX ADMIN — OXYCODONE AND ACETAMINOPHEN 1 TABLET(S): 5; 325 TABLET ORAL at 13:59

## 2018-12-11 RX ADMIN — SODIUM CHLORIDE 1000 MILLILITER(S): 9 INJECTION INTRAMUSCULAR; INTRAVENOUS; SUBCUTANEOUS at 04:13

## 2018-12-11 NOTE — SWALLOW BEDSIDE ASSESSMENT ADULT - COMMENTS
73yFemale w/ r/o R septic knee, physical examination is not impressive for R knee septic arthritis. Gram stain negative. Will await results of knee arthrocentesis to make final determination.

## 2018-12-11 NOTE — H&P ADULT - PROBLEM SELECTOR PLAN 4
Hold lisinopril in setting of ZAHRAA Glucose on CMP = 251; AG = 21, FS = 130; pt on metformin at home  - hold metformin  - IVF hydration  - ISS  - Diabetic diet as tolerated  - f/u for improvement in FS, AG  - f/u HgbA1c level in the AM

## 2018-12-11 NOTE — H&P ADULT - FAMILY HISTORY
Family history of coronary artery disease in father     Sibling  Still living? Unknown  Family history of dementia, Age at diagnosis: Age Unknown     Father  Still living? Unknown  Family history of diabetes mellitus in father, Age at diagnosis: Age Unknown

## 2018-12-11 NOTE — ADVANCED PRACTICE NURSE CONSULT - RECOMMEDATIONS
Topical Recommendations:    Right Buttock- cleanse with NS, Apply LBF to periwound skin, cover with silicone foam with border. Change daily.     Continue low air loss bed therapy,  heel elevation, turn & reposition q2h, continue moisture management with barrier creams & single breathable pad, continue measures to decrease friction/shear.   Plan discussed with patient and Provider.  Questions answers.     Please contact Wound Care Service Line if we can be of further assistance (ext [phone]).

## 2018-12-11 NOTE — H&P ADULT - PROBLEM SELECTOR PLAN 5
c/w synthroid 75 mcg. c/w synthroid 75 mcg.  - TSH in AM Pt with urinary incontinence.  DDx includes stress vs neurogenic causes.  Given that pt does not have any weakness in LE, less likely neurogenic causes.  - Bladder scan to ensure pt is not retaining

## 2018-12-11 NOTE — ADVANCED PRACTICE NURSE CONSULT - REASON FOR CONSULT
Patient seen on skin care rounds after wound care referral received for assessment of skin impairment and recommendations of topical management. Chart reviewed: WBC 16.84, Hemoglobin/Hematocrit 10.2/31.3, Serum Protein 8.2g/dL, Serum albumin 3.4g/dL, Lactate 2.3mmol/L, BMI 34.4kg/m2, Arpan 16. Patient H/O of HLD, HTN, OA, Alzheimer's disease. Patient admitted with fatigue, difficulty ambulating, leukocytosis, and R knee pain. Pt being seen and followed by Orthopedics for right Knee pain.

## 2018-12-11 NOTE — H&P ADULT - PROBLEM SELECTOR PLAN 6
Tylenol prn No acute issues presently  Holding lisinopril in setting of ZAHRAA  Introduce other anti-HTN medication if/as necessary

## 2018-12-11 NOTE — H&P ADULT - PROBLEM SELECTOR PROBLEM 7
Need for prophylactic measure Late onset Alzheimer's disease without behavioral disturbance Hypothyroidism

## 2018-12-11 NOTE — PHYSICAL THERAPY INITIAL EVALUATION ADULT - PLANNED THERAPY INTERVENTIONS, PT EVAL
balance training/bed mobility training/strengthening/Patient left supine in bed in NAD; call bell in reach; +bed check; nursing staff at bedside./gait training/transfer training

## 2018-12-11 NOTE — H&P ADULT - NSHPSOCIALHISTORY_GEN_ALL_CORE
, lives alone  Denies EtOH or tobacco or drug use. , lives alone; son and daughter help patient with ADLs and IADLs.  Denies EtOH or tobacco or drug use.

## 2018-12-11 NOTE — H&P ADULT - PROBLEM SELECTOR PLAN 7
c/w simvastatin  Diabetic and DASH diet  Miladys Caicedo MD, PhD  PGY-2| Internal Medicine  [phone] / [phone] Pt with progressive dementia, followed by neurology  - c/w donezepril and mirtazepine and quetiapine  - will obtain CTH Pt with progressive dementia, followed by neurology  - c/w donezepril and mirtazepine and quetiapine  - will obtain CTH  - Social work consult placed for help for family c/w synthroid 75 mcg.  - TSH in AM

## 2018-12-11 NOTE — H&P ADULT - NSHPPHYSICALEXAM_GEN_ALL_CORE
Vital Signs Last 24 Hrs  T(C): 36.4 (11 Dec 2018 02:01), Max: 36.7 (10 Dec 2018 19:39)  T(F): 97.6 (11 Dec 2018 02:01), Max: 98.1 (10 Dec 2018 19:39)  HR: 65 (11 Dec 2018 02:01) (65 - 94)  BP: 115/54 (11 Dec 2018 02:01) (100/56 - 125/55)  BP(mean): --  RR: 17 (11 Dec 2018 02:01) (16 - 18)  SpO2: 96% (11 Dec 2018 02:01) (96% - 99%)    Appearance: Sitting in bed, NAD  HEENT:   Normal oral mucosa, PERRL, EOMI, anicteric sclera  Cardiovascular: RRR, No murmurs, gallops or rubs appreciated  Respiratory: Lungs clear to auscultation bilaterally, no wheezes, crackles appreciated  Gastrointestinal:  Soft, nondistended, nontender, +BS	  Skin: No rashes, eccymosis or cyanosis noted	  Neurologic: AOx1-2 (self, knows she's in a hospital), CNII-XII grossly intact, motor and sensory function grossly intact  Extremities: Moving all extremities equally; no weakness noted  Tenderness to palpation in lower spine  [place] neck deformity noted on bilateral hands.  Vascular: palpable dp and radial pulses 2+ bilaterally  Psych:  Pleasantly confused. Vital Signs Last 24 Hrs  T(C): 36.4 (11 Dec 2018 02:01), Max: 36.7 (10 Dec 2018 19:39)  T(F): 97.6 (11 Dec 2018 02:01), Max: 98.1 (10 Dec 2018 19:39)  HR: 65 (11 Dec 2018 02:01) (65 - 94)  BP: 115/54 (11 Dec 2018 02:01) (100/56 - 125/55)  BP(mean): --  RR: 17 (11 Dec 2018 02:01) (16 - 18)  SpO2: 96% (11 Dec 2018 02:01) (96% - 99%)    Appearance: Sitting in bed, NAD  HEENT:   Normal oral mucosa, PERRL, EOMI, anicteric sclera  Cardiovascular: RRR, No murmurs, gallops or rubs appreciated  Respiratory: Lungs clear to auscultation bilaterally, no wheezes, crackles appreciated  Gastrointestinal:  Soft, nondistended, nontender, +BS	  Skin: No rashes, eccymosis or cyanosis noted	  Neurologic: AOx1-2 (self, knows she's in a hospital), CNII-XII grossly intact, motor and sensory function grossly intact  Extremities: Moving all extremities equally; no weakness noted  Tenderness to palpation in lower spine  [place] neck deformity noted on bilateral hands.  Right knee with minimal warmth, no erythema noted.  Vascular: palpable dp and radial pulses 2+ bilaterally  Psych:  Pleasantly confused.

## 2018-12-11 NOTE — SWALLOW BEDSIDE ASSESSMENT ADULT - SWALLOW EVAL: DIAGNOSIS
Patient presents with functional oral and pharyngeal stage swallow mechanism characterized by adequate oral containment, adequate chewing for solid, bolus manipulation and transport. There is laryngeal elevation upon palpation, initiation of the pharyngeal swallow. There were no overt signs of impaired airway protection across all PO trials.

## 2018-12-11 NOTE — ADVANCED PRACTICE NURSE CONSULT - ASSESSMENT
A&Ox 2, disoriented to time and situation stating "I don't know why I am here or what's going on" (Reorientation provided),ambulatory with assistance at baseline, incontinent of urine at times. Skin warm, dry with increased moisture in intertriginous folds (sacral/gluteal folds), adequate skin turgor, scattered areas of hyperpigmentation and hypopigmentation. Ace bandage removed from right knee( site of previous synovial fluid aspiration, skin intact).     Right Buttock (within gluteal fold) Difficult Differential Diagnosis Stage II Pressure Injury vs Frictional Wound. Area measuring 2cmx1.5cmx0.2cm. Tissue presenting as 100% pink moist dermis. Scant serous drainage. Periwound noted with blanching erythema. No induration, no increased warmth. Not over bone, within gluteal folds of right buttock, unable to be seen in anatomical position, Rubbing noted within gluteal fold when patient in anatomical position. Goals of care: Protect from friction and shear, Maintain moist environment for optimal wound healing, protect periwound skin.

## 2018-12-11 NOTE — PHYSICAL THERAPY INITIAL EVALUATION ADULT - PERTINENT HX OF CURRENT PROBLEM, REHAB EVAL
73F PM H arthritis, HTN, Alzheimer's, hypothyroidism presenting with 1 day of fatigue and difficulty walking. Pt was brought in by her son as he noticed she was having difficulty walking.  Per son, pt stated she could not get up.  Denied fevers or chills.  Son states patient would stand up, walk a little and then sit right back down, complaining her legs felt weak.  CT head negative. CXR negative.

## 2018-12-11 NOTE — H&P ADULT - PROBLEM SELECTOR PROBLEM 8
Type 2 diabetes mellitus without complication, without long-term current use of insulin Late onset Alzheimer's disease without behavioral disturbance

## 2018-12-11 NOTE — H&P ADULT - PROBLEM SELECTOR PLAN 2
Pt with WBC 24, shift towards neutrophils.  Ceftriaxone given in ED.  Question of join effusion on RLE?  - will obtain procalcitonin to look for infection  - consult ortho if leukocytosis does not resolve  - consider spinal scan if WBC does not decrease to look for possible abscess. Pt with WBC 24, shift towards neutrophils.  Ceftriaxone given in ED.  Question of joint effusion on RLE?  - consult ortho for possible septic joint  - consider spinal scan if no other source found. Pt with WBC 24, shift towards neutrophils.  Ceftriaxone given in ED.  Question of joint effusion on RLE?  - consult ortho for possible septic joint  - will obtain xray of spine given pain on palpation  - hold abx for now given no fevers Pt presenting with ZAHRAA, Cr 1.80, from baseline ~ 1.  Likely in the setting of acute infection.  Given that UA is neg, unlikely UTI  - will obtain bladder scan to ensure no post obstructive nephropathy.  - will start on LR @ 75 after 1L NS bolus. (s/p 1 liter IVF in the ED)  - hold lisinopril

## 2018-12-11 NOTE — H&P ADULT - PROBLEM SELECTOR PLAN 3
Pt with urinary incontinence.  DDx includes stress vs neurogenic causes.  Give that pt does not have any weakness in LE, will r/o neurogeic causes.  - Bladder scan to ensure pt is not rettaining blood. Pt with urinary incontinence.  DDx includes stress vs neurogenic causes.  Give that pt does not have any weakness in LE, less likely neurogeic causes.  - Bladder scan to ensure pt is not rettaining blood. Pt with urinary incontinence.  DDx includes stress vs neurogenic causes.  Give that pt does not have any weakness in LE, less likely neurogeic causes.  - Bladder scan to ensure pt is not rettaining Significant pain of B/L knees with mild swelling  X-ray of R-knee significant for "Tricompartmental joint space narrowing and degenerative changes comptabile with osteoarthritis. Trace to small joint effusion."  Orthopedic consult re suspected septic arthritis  Tylenol prn mild pain  Percocet PRN moderate pain  Lidoderm patch to B/L knees  Fall precautions  Physical therapy evaluation when optimal

## 2018-12-11 NOTE — PHYSICAL THERAPY INITIAL EVALUATION ADULT - ADDITIONAL COMMENTS
Patient reports having a few steps in the front of the house to use with bilateral railings; one flight of stairs to bedroom with unilateral railings.

## 2018-12-11 NOTE — H&P ADULT - PROBLEM SELECTOR PLAN 1
Pt presenting with ZAHRAA, Cr 1.80, from baseline ~ 1.  Likely in the setting of acute infection.  Given that UA is neg, unlikely UTI  - will obtain bladder scan to ensure no post obstructive nephropathy. Pt presenting with ZAHRAA, Cr 1.80, from baseline ~ 1.  Likely in the setting of acute infection.  Given that UA is neg, unlikely UTI  - will obtain bladder scan to ensure no post obstructive nephropathy.  - will start on LR @ 75 after 1L NS bolus.  - hold lisinopril Tachycardic and with leukocytosis (24.45)  - unclear etiology at present, but may be due to one or a combination of issues septic arthritis (B/L knee swelling and w/ R-knee X-ray suggestive of osteoarthritis and trace to small joint effusion."  - CXR negative for any acute findings, but early disease may not be evident  - patient coughing/choking on ingesting medications  - orthopedic team consulted for evaluation of knees for possible septic arthritis  - has right subgluteal fold ulcer (Wound care consult in progress)  - started on ceftriaxone in the ED; will continue with aztreonam for now (since patient penicillin allergic)  - f/u cultures  - CRP, ESR in the AM  - may need ID consult  - IVF hydration  - Tylenol PRN fever

## 2018-12-11 NOTE — CONSULT NOTE ADULT - ATTENDING COMMENTS
Extremity: R knee +PF / TF crepitus, range of motion 0 - 110 degrees.  Radiographs (by report): tricompartmental arthritis R knee, chondrocalcinosis, vascular calcification  Labs: cell count 36,017 WBC; 36,000 RBC; +crystals present  GS: NOS.  Cx: NG at 24 hours.    -Recommend rheumatology assessment and management

## 2018-12-11 NOTE — CONSULT NOTE ADULT - SUBJECTIVE AND OBJECTIVE BOX
Orthopedic Surgery Consult Note    73yFemale PMH significant for HLD, HTN, OA, alzheimer's presents admitted to medicine service with fatigue, difficulty ambulating, leukocytosis, and R knee pain. Pt states that she has felt more tired than usually and her R knee has been bothering more than usual as well but is able to walk on it. Pt denies radiation of pain. Pt denies numbness, tingling, or burning in the affected extremity. Pt denies recent trauma. Pt denies recent fever/chills. No other bone/joint complaints.    PMH:  HLD (hyperlipidemia)  HTN (hypertension)  Arthritis  Alzheimer disease  Hypothyroidism    PSH:  History of appendectomy    AH:    Meds: See med rec    T(C): 36.4 (12-11-18 @ 02:01)  HR: 65 (12-11-18 @ 02:01)  BP: 115/54 (12-11-18 @ 02:01)  RR: 17 (12-11-18 @ 02:01)  SpO2: 96% (12-11-18 @ 02:01)  Wt(kg): --    PE:  Gen: NAD  RLE:   Skin intact, no soft tissue swelling, effusion, ecchymosis, erythema, or warmth of knee  + TTP about knee, decreased ROM 2/2 pain  Crepitus felt during arc of motion  Compartments soft  Motor intact GS/TA/EHL/FHL  SILT S/S/SP/DP/T, 2+ DP    Imaging:  R knee XR: Tri compartment osteoarthritis with trace knee effusion    Procedure Note:    After sterile skin preparation with 2 chlorhexidine prep sticks, an 18 gauge needle was introduced into the superolateral capsule and around 15 cc of viscous serous fluid was aspirated. Joint fluid was then sent for cell count, crystals, gram stain and culture. The knee was then cleaned and a sterile dressing and ace wrap was placed over the aspiration site. The pt tolerated the procedure well and NVI post procedure.     AP: 73yFemale w/ r/o R septic knee, physical examination is not impressive for R knee septic arthritis. Will await results of knee arthrocentesis to make final determination.   - Pain control  - WBAT on affected extremity  - FU labs, cell count, crystals, gram stain, culture  - PT/OT   - Med mgmt per primary team  - Patient should be kept NPO pending synovial fluid analysis

## 2018-12-11 NOTE — H&P ADULT - PROBLEM SELECTOR PLAN 9
c/w simvastatin  Diabetic and DASH diet  Miladys Caicedo MD, PhD  PGY-2| Internal Medicine  [phone] / [phone]

## 2018-12-11 NOTE — H&P ADULT - PROBLEM SELECTOR PROBLEM 4
HTN (hypertension) Type 2 diabetes mellitus with hyperglycemia, without long-term current use of insulin

## 2018-12-11 NOTE — H&P ADULT - PROBLEM SELECTOR PLAN 8
Pt with DM2, metformin at home  - hold metformin  - ISS  - Diabetic diet. Pt with progressive dementia, followed by neurology  - hold donepezil and mirtazepine for now (evaluate for restart)  - continuing with quetiapine  - will obtain CTH  - Social work consult placed for help for family

## 2018-12-11 NOTE — H&P ADULT - ASSESSMENT
73F PMH HTN, hypothyroidism, DM2, Alzhiemer's dementia with depression presenting with 1 day of altered mental status found to have leukocytosis and ZAHRAA. 73F PMH HTN, hypothyroidism, DM2, Alzhiemer's dementia with depression presenting with 1 day of altered mental status found to have leukocytosis and ZAHRAA, suggestive of infectious process.  DDx pulm

## 2018-12-11 NOTE — H&P ADULT - HISTORY OF PRESENT ILLNESS
73F PM H arthritis, HTN, Alzheimer's, hypothyroidism presenting with 1 day of fatigue and difficulty walking.  Pt was brought in by her son today as he noticed she was having difficulty walking.  Per son, pt stated she could not get up today.  Denied fevers or chills.  Son states patient would stand up, walk a little and then sit right back down, complaining her legs felt weak.  At some point, son states patient had an episode of urinary incontinence.  At this point, pt was brought to [facility] for further care.    In the ED, pt was 98.1, HR 90, /55, RR 18 and 99% on RA.  Pt was c/o R knee and right ankle pain so xrays were done that were neg.  Pt had blood work showing leukocytosis to 24 with left shift.  Ceftriaxone, motrin and 1L NS were given.  Pt was admitted to medicine for further care.    Pt was examined at bedside this evening.  Pt was unable to provide history and most of history was obtained from son at bedside.  Per son, pt has been having increased memory problems, deteriorating rapidly in the last few months.  At the beginning of the year, pt was still working.  Son states that 1 month ago, pt did have a episode of nonresponsiveness for 20 mintues which they were told was due to vasovagal syncope.  Son has been following with neurology and psych in order to better control medications to help with patient's memory loss. 73F PM H arthritis, HTN, Alzheimer's, hypothyroidism presenting with 1 day of fatigue and difficulty walking.  Pt is a poor historian and history was obtained from charts and son.  Pt was brought in by her son today as he noticed she was having difficulty walking.  Per son, pt stated she could not get up today.  Denied fevers or chills.  Son states patient would stand up, walk a little and then sit right back down, complaining her legs felt weak.  At some point, son states patient had an episode of urinary incontinence.  At this point, pt was brought to [facility] for further care.    In the ED, pt was 98.1, HR 90, /55, RR 18 and 99% on RA.  Pt was c/o R knee and right ankle pain so xrays were done that were neg.  Pt had blood work showing leukocytosis to 24 with left shift.  Ceftriaxone, motrin and 1L NS were given.  Pt was admitted to medicine for further care.    Pt was examined at bedside this evening. Per son, pt has been having increased memory problems, deteriorating rapidly in the last few months.  At the beginning of the year, pt was still working.  Son states that 1 month ago, pt did have a episode of nonresponsiveness for 20 mintues which they were told was due to vasovagal syncope.  Son has been following with neurology and psych in order to better control medications to help with patient's memory loss.

## 2018-12-11 NOTE — PHYSICAL THERAPY INITIAL EVALUATION ADULT - DISCHARGE DISPOSITION, PT EVAL
rehabilitation facility/to improve functional status and strength, to attain baseline functional status

## 2018-12-11 NOTE — H&P ADULT - NSHPLABSRESULTS_GEN_ALL_CORE
12-10    134<L>  |  90<L>  |  65<H>  ----------------------------<  251<H>  4.6   |  23  |  1.80<H>    Ca    9.8      10 Dec 2018 19:50    TPro  8.2  /  Alb  3.4  /  TBili  0.4  /  DBili  x   /  AST  13  /  ALT  8   /  AlkPhos  99  12-10                    Urinalysis Basic - ( 10 Dec 2018 21:08 )    Color: YELLOW / Appearance: Lt TURBID / S.023 / pH: 5.5  Gluc: 30 / Ketone: NEGATIVE  / Bili: TRACE / Urobili: TRACE   Blood: SMALL / Protein: 100 / Nitrite: NEGATIVE   Leuk Esterase: TRACE / RBC: 3-5 / WBC 6-10   Sq Epi: MODERATE / Non Sq Epi: x / Bacteria: NEGATIVE                              12.6   24.45 )-----------( 262      ( 10 Dec 2018 19:50 )             38.7     CAPILLARY BLOOD GLUCOSE      CXR:  clear  Xray knee and ankle:  No emergent findings; osteo arthritis in knee.

## 2018-12-12 LAB
ALBUMIN SERPL ELPH-MCNC: 3.2 G/DL — LOW (ref 3.3–5)
ALP SERPL-CCNC: 81 U/L — SIGNIFICANT CHANGE UP (ref 40–120)
ALT FLD-CCNC: 9 U/L — SIGNIFICANT CHANGE UP (ref 4–33)
AST SERPL-CCNC: 14 U/L — SIGNIFICANT CHANGE UP (ref 4–32)
BACTERIA UR CULT: SIGNIFICANT CHANGE UP
BASOPHILS # BLD AUTO: 0.01 K/UL — SIGNIFICANT CHANGE UP (ref 0–0.2)
BASOPHILS NFR BLD AUTO: 0.1 % — SIGNIFICANT CHANGE UP (ref 0–2)
BILIRUB SERPL-MCNC: < 0.2 MG/DL — LOW (ref 0.2–1.2)
BUN SERPL-MCNC: 44 MG/DL — HIGH (ref 7–23)
CALCIUM SERPL-MCNC: 9 MG/DL — SIGNIFICANT CHANGE UP (ref 8.4–10.5)
CHLORIDE SERPL-SCNC: 97 MMOL/L — LOW (ref 98–107)
CO2 SERPL-SCNC: 24 MMOL/L — SIGNIFICANT CHANGE UP (ref 22–31)
CREAT SERPL-MCNC: 1.08 MG/DL — SIGNIFICANT CHANGE UP (ref 0.5–1.3)
EOSINOPHIL # BLD AUTO: 0 K/UL — SIGNIFICANT CHANGE UP (ref 0–0.5)
EOSINOPHIL NFR BLD AUTO: 0 % — SIGNIFICANT CHANGE UP (ref 0–6)
GLUCOSE SERPL-MCNC: 300 MG/DL — HIGH (ref 70–99)
HBA1C BLD-MCNC: 7.5 % — HIGH (ref 4–5.6)
HCT VFR BLD CALC: 34.5 % — SIGNIFICANT CHANGE UP (ref 34.5–45)
HGB BLD-MCNC: 11 G/DL — LOW (ref 11.5–15.5)
IMM GRANULOCYTES # BLD AUTO: 0.09 # — SIGNIFICANT CHANGE UP
IMM GRANULOCYTES NFR BLD AUTO: 0.9 % — SIGNIFICANT CHANGE UP (ref 0–1.5)
LYMPHOCYTES # BLD AUTO: 0.97 K/UL — LOW (ref 1–3.3)
LYMPHOCYTES # BLD AUTO: 10.1 % — LOW (ref 13–44)
MAGNESIUM SERPL-MCNC: 2.1 MG/DL — SIGNIFICANT CHANGE UP (ref 1.6–2.6)
MCHC RBC-ENTMCNC: 28.9 PG — SIGNIFICANT CHANGE UP (ref 27–34)
MCHC RBC-ENTMCNC: 31.9 % — LOW (ref 32–36)
MCV RBC AUTO: 90.6 FL — SIGNIFICANT CHANGE UP (ref 80–100)
MONOCYTES # BLD AUTO: 0.11 K/UL — SIGNIFICANT CHANGE UP (ref 0–0.9)
MONOCYTES NFR BLD AUTO: 1.1 % — LOW (ref 2–14)
NEUTROPHILS # BLD AUTO: 8.39 K/UL — HIGH (ref 1.8–7.4)
NEUTROPHILS NFR BLD AUTO: 87.8 % — HIGH (ref 43–77)
NRBC # FLD: 0 — SIGNIFICANT CHANGE UP
PHOSPHATE SERPL-MCNC: 2.8 MG/DL — SIGNIFICANT CHANGE UP (ref 2.5–4.5)
PLATELET # BLD AUTO: 256 K/UL — SIGNIFICANT CHANGE UP (ref 150–400)
PMV BLD: 10.7 FL — SIGNIFICANT CHANGE UP (ref 7–13)
POTASSIUM SERPL-MCNC: 4.9 MMOL/L — SIGNIFICANT CHANGE UP (ref 3.5–5.3)
POTASSIUM SERPL-SCNC: 4.9 MMOL/L — SIGNIFICANT CHANGE UP (ref 3.5–5.3)
PROT SERPL-MCNC: 6.9 G/DL — SIGNIFICANT CHANGE UP (ref 6–8.3)
RBC # BLD: 3.81 M/UL — SIGNIFICANT CHANGE UP (ref 3.8–5.2)
RBC # FLD: 12.9 % — SIGNIFICANT CHANGE UP (ref 10.3–14.5)
SODIUM SERPL-SCNC: 134 MMOL/L — LOW (ref 135–145)
SPECIMEN SOURCE: SIGNIFICANT CHANGE UP
T3 SERPL-MCNC: 32.4 NG/DL — LOW (ref 80–200)
T4 AB SER-ACNC: 5.99 UG/DL — SIGNIFICANT CHANGE UP (ref 5.1–13)
T4 FREE SERPL-MCNC: 1.24 NG/DL — SIGNIFICANT CHANGE UP (ref 0.9–1.8)
TSH SERPL-MCNC: 1.32 UIU/ML — SIGNIFICANT CHANGE UP (ref 0.27–4.2)
WBC # BLD: 9.57 K/UL — SIGNIFICANT CHANGE UP (ref 3.8–10.5)
WBC # FLD AUTO: 9.57 K/UL — SIGNIFICANT CHANGE UP (ref 3.8–10.5)

## 2018-12-12 PROCEDURE — 99233 SBSQ HOSP IP/OBS HIGH 50: CPT | Mod: GC

## 2018-12-12 PROCEDURE — 72100 X-RAY EXAM L-S SPINE 2/3 VWS: CPT | Mod: 26

## 2018-12-12 PROCEDURE — 99222 1ST HOSP IP/OBS MODERATE 55: CPT

## 2018-12-12 RX ORDER — INSULIN LISPRO 100/ML
2 VIAL (ML) SUBCUTANEOUS
Qty: 0 | Refills: 0 | Status: DISCONTINUED | OUTPATIENT
Start: 2018-12-12 | End: 2018-12-13

## 2018-12-12 RX ORDER — INSULIN GLARGINE 100 [IU]/ML
4 INJECTION, SOLUTION SUBCUTANEOUS AT BEDTIME
Qty: 0 | Refills: 0 | Status: DISCONTINUED | OUTPATIENT
Start: 2018-12-12 | End: 2018-12-13

## 2018-12-12 RX ORDER — LISINOPRIL 2.5 MG/1
2.5 TABLET ORAL DAILY
Qty: 0 | Refills: 0 | Status: DISCONTINUED | OUTPATIENT
Start: 2018-12-12 | End: 2018-12-13

## 2018-12-12 RX ADMIN — Medication 1: at 22:23

## 2018-12-12 RX ADMIN — SODIUM CHLORIDE 75 MILLILITER(S): 9 INJECTION, SOLUTION INTRAVENOUS at 05:35

## 2018-12-12 RX ADMIN — MIRTAZAPINE 15 MILLIGRAM(S): 45 TABLET, ORALLY DISINTEGRATING ORAL at 21:36

## 2018-12-12 RX ADMIN — Medication 2: at 12:50

## 2018-12-12 RX ADMIN — LIDOCAINE 1 PATCH: 4 CREAM TOPICAL at 12:48

## 2018-12-12 RX ADMIN — SODIUM CHLORIDE 75 MILLILITER(S): 9 INJECTION, SOLUTION INTRAVENOUS at 20:15

## 2018-12-12 RX ADMIN — LISINOPRIL 2.5 MILLIGRAM(S): 2.5 TABLET ORAL at 18:41

## 2018-12-12 RX ADMIN — LIDOCAINE 2 PATCH: 4 CREAM TOPICAL at 12:45

## 2018-12-12 RX ADMIN — HEPARIN SODIUM 5000 UNIT(S): 5000 INJECTION INTRAVENOUS; SUBCUTANEOUS at 21:36

## 2018-12-12 RX ADMIN — LIDOCAINE 1 PATCH: 4 CREAM TOPICAL at 01:45

## 2018-12-12 RX ADMIN — SIMVASTATIN 40 MILLIGRAM(S): 20 TABLET, FILM COATED ORAL at 21:36

## 2018-12-12 RX ADMIN — LIDOCAINE 2 PATCH: 4 CREAM TOPICAL at 18:39

## 2018-12-12 RX ADMIN — LIDOCAINE 1 PATCH: 4 CREAM TOPICAL at 12:44

## 2018-12-12 RX ADMIN — HEPARIN SODIUM 5000 UNIT(S): 5000 INJECTION INTRAVENOUS; SUBCUTANEOUS at 05:35

## 2018-12-12 RX ADMIN — Medication 2 UNIT(S): at 18:29

## 2018-12-12 RX ADMIN — QUETIAPINE FUMARATE 25 MILLIGRAM(S): 200 TABLET, FILM COATED ORAL at 12:44

## 2018-12-12 RX ADMIN — LIDOCAINE 1 PATCH: 4 CREAM TOPICAL at 18:47

## 2018-12-12 RX ADMIN — Medication 4: at 18:28

## 2018-12-12 RX ADMIN — Medication 4: at 09:06

## 2018-12-12 RX ADMIN — DONEPEZIL HYDROCHLORIDE 10 MILLIGRAM(S): 10 TABLET, FILM COATED ORAL at 21:36

## 2018-12-12 RX ADMIN — LIDOCAINE 1 PATCH: 4 CREAM TOPICAL at 18:48

## 2018-12-12 RX ADMIN — Medication 75 MICROGRAM(S): at 05:35

## 2018-12-12 RX ADMIN — HEPARIN SODIUM 5000 UNIT(S): 5000 INJECTION INTRAVENOUS; SUBCUTANEOUS at 13:43

## 2018-12-12 RX ADMIN — Medication 30 MILLIGRAM(S): at 12:43

## 2018-12-12 RX ADMIN — INSULIN GLARGINE 4 UNIT(S): 100 INJECTION, SOLUTION SUBCUTANEOUS at 22:27

## 2018-12-12 NOTE — PROGRESS NOTE ADULT - PROBLEM SELECTOR PROBLEM 6
Late onset Alzheimer's disease without behavioral disturbance
Late onset Alzheimer's disease without behavioral disturbance

## 2018-12-12 NOTE — PROGRESS NOTE ADULT - PROBLEM SELECTOR PLAN 7
c/w simvastatin  SQH  Diabetic and DASH diet  PT  Dispo: pt and pt's son agreeable to [facility] (recommended by PT). pt's son would prefer a [facility] closer to [place] (where they live) but are amenable to other rehab locations around [facility].
c/w simvastatin  SQH  Diabetic and DASH diet

## 2018-12-12 NOTE — PROGRESS NOTE ADULT - PROBLEM SELECTOR PLAN 6
Pt with progressive dementia, followed by neurology  - c/w donepezil and mirtazepine   - continuing with quetiapine  - CT with no acute pathology  - Social work consult placed for help for family
Pt with progressive dementia, followed by neurology  - c/w donepezil and mirtazepine   - continuing with quetiapine  - f/u CTH  - Social work consult placed for help for family

## 2018-12-13 ENCOUNTER — TRANSCRIPTION ENCOUNTER (OUTPATIENT)
Age: 73
End: 2018-12-13

## 2018-12-13 VITALS
HEART RATE: 72 BPM | RESPIRATION RATE: 19 BRPM | OXYGEN SATURATION: 98 % | TEMPERATURE: 98 F | SYSTOLIC BLOOD PRESSURE: 133 MMHG | DIASTOLIC BLOOD PRESSURE: 58 MMHG

## 2018-12-13 LAB
BUN SERPL-MCNC: 30 MG/DL — HIGH (ref 7–23)
CALCIUM SERPL-MCNC: 8.5 MG/DL — SIGNIFICANT CHANGE UP (ref 8.4–10.5)
CHLORIDE SERPL-SCNC: 102 MMOL/L — SIGNIFICANT CHANGE UP (ref 98–107)
CO2 SERPL-SCNC: 24 MMOL/L — SIGNIFICANT CHANGE UP (ref 22–31)
CREAT SERPL-MCNC: 0.96 MG/DL — SIGNIFICANT CHANGE UP (ref 0.5–1.3)
GLUCOSE SERPL-MCNC: 212 MG/DL — HIGH (ref 70–99)
HCT VFR BLD CALC: 29.3 % — LOW (ref 34.5–45)
HGB BLD-MCNC: 9.4 G/DL — LOW (ref 11.5–15.5)
MCHC RBC-ENTMCNC: 29.2 PG — SIGNIFICANT CHANGE UP (ref 27–34)
MCHC RBC-ENTMCNC: 32.1 % — SIGNIFICANT CHANGE UP (ref 32–36)
MCV RBC AUTO: 91 FL — SIGNIFICANT CHANGE UP (ref 80–100)
NRBC # FLD: 0 — SIGNIFICANT CHANGE UP
PLATELET # BLD AUTO: 300 K/UL — SIGNIFICANT CHANGE UP (ref 150–400)
PMV BLD: 10.6 FL — SIGNIFICANT CHANGE UP (ref 7–13)
POTASSIUM SERPL-MCNC: 4.3 MMOL/L — SIGNIFICANT CHANGE UP (ref 3.5–5.3)
POTASSIUM SERPL-SCNC: 4.3 MMOL/L — SIGNIFICANT CHANGE UP (ref 3.5–5.3)
RBC # BLD: 3.22 M/UL — LOW (ref 3.8–5.2)
RBC # FLD: 12.9 % — SIGNIFICANT CHANGE UP (ref 10.3–14.5)
SODIUM SERPL-SCNC: 136 MMOL/L — SIGNIFICANT CHANGE UP (ref 135–145)
WBC # BLD: 12.27 K/UL — HIGH (ref 3.8–10.5)
WBC # FLD AUTO: 12.27 K/UL — HIGH (ref 3.8–10.5)

## 2018-12-13 PROCEDURE — 99239 HOSP IP/OBS DSCHRG MGMT >30: CPT

## 2018-12-13 RX ORDER — INSULIN GLARGINE 100 [IU]/ML
8 INJECTION, SOLUTION SUBCUTANEOUS
Qty: 0 | Refills: 0 | DISCHARGE
Start: 2018-12-13

## 2018-12-13 RX ORDER — ALLOPURINOL 300 MG
1 TABLET ORAL
Qty: 30 | Refills: 0 | OUTPATIENT
Start: 2018-12-13

## 2018-12-13 RX ORDER — INSULIN GLARGINE 100 [IU]/ML
8 INJECTION, SOLUTION SUBCUTANEOUS AT BEDTIME
Qty: 0 | Refills: 0 | Status: DISCONTINUED | OUTPATIENT
Start: 2018-12-13 | End: 2018-12-13

## 2018-12-13 RX ORDER — INSULIN LISPRO 100/ML
4 VIAL (ML) SUBCUTANEOUS
Qty: 0 | Refills: 0 | Status: DISCONTINUED | OUTPATIENT
Start: 2018-12-13 | End: 2018-12-13

## 2018-12-13 RX ADMIN — Medication 30 MILLIGRAM(S): at 05:50

## 2018-12-13 RX ADMIN — Medication 75 MICROGRAM(S): at 05:50

## 2018-12-13 RX ADMIN — LISINOPRIL 2.5 MILLIGRAM(S): 2.5 TABLET ORAL at 05:50

## 2018-12-13 RX ADMIN — LIDOCAINE 1 PATCH: 4 CREAM TOPICAL at 13:25

## 2018-12-13 RX ADMIN — LIDOCAINE 1 PATCH: 4 CREAM TOPICAL at 00:21

## 2018-12-13 RX ADMIN — Medication 2 UNIT(S): at 09:08

## 2018-12-13 RX ADMIN — Medication 2 UNIT(S): at 13:27

## 2018-12-13 RX ADMIN — HEPARIN SODIUM 5000 UNIT(S): 5000 INJECTION INTRAVENOUS; SUBCUTANEOUS at 13:27

## 2018-12-13 RX ADMIN — LIDOCAINE 1 PATCH: 4 CREAM TOPICAL at 13:26

## 2018-12-13 RX ADMIN — Medication 2: at 09:08

## 2018-12-13 RX ADMIN — SODIUM CHLORIDE 75 MILLILITER(S): 9 INJECTION, SOLUTION INTRAVENOUS at 05:55

## 2018-12-13 RX ADMIN — HEPARIN SODIUM 5000 UNIT(S): 5000 INJECTION INTRAVENOUS; SUBCUTANEOUS at 05:50

## 2018-12-13 RX ADMIN — QUETIAPINE FUMARATE 25 MILLIGRAM(S): 200 TABLET, FILM COATED ORAL at 13:25

## 2018-12-13 RX ADMIN — LIDOCAINE 2 PATCH: 4 CREAM TOPICAL at 00:20

## 2018-12-13 RX ADMIN — Medication 3: at 13:27

## 2018-12-13 NOTE — DISCHARGE NOTE ADULT - CARE PLAN
Principal Discharge DX:	Gout flare  Goal:	Continue steroid taper as prescribed  Assessment and plan of treatment:	You were hospitalized for severe pain in the right knee making if difficult for you to ambulate. Fluid was removed from your right knee joint and tested negative for infection but contained uric acid crystals indicative of gout. You were treated for a flare of your known gouty arthritis with IV and then oral steroids. Please continue to take prednisone as prescribed to reduce inflammation in your right knee. Please take allopurinol which helps to lower blood levels of uric acid and decreases formation of uric acid crystals.  Secondary Diagnosis:	Hyperglycemia, drug-induced  Goal:	Continue basal bolus insulin as needed  Assessment and plan of treatment:	You are being treated with steroids for an acute flare of gout which have raised your blood sugar levels. You required insulin injections in the hospital to lower you blood sugar. Please continue insulin as needed to maintain your blood sugars within the normal range as long as you are taking the steroids.  Secondary Diagnosis:	Impaired mobility  Goal:	Got to subacute rehab  Assessment and plan of treatment:	You have having impaired mobility within your home due to your gouty arthritis and joint degeneration. Please go to subacute rehab for muscle strengthening and overall improve conditioning. Principal Discharge DX:	Gout flare  Goal:	Continue steroid taper as prescribed  Assessment and plan of treatment:	You were hospitalized for severe pain in the right knee making if difficult for you to ambulate. Fluid was removed from your right knee joint and tested negative for infection but contained uric acid crystals indicative of gout. You were treated for a flare of your known gouty arthritis with IV and then oral steroids. Please continue to take prednisone as prescribed to reduce inflammation in your right knee. Please take allopurinol which helps to lower blood levels of uric acid and decreases formation of uric acid crystals.  Secondary Diagnosis:	Hyperglycemia, drug-induced  Goal:	Continue basal bolus insulin as needed  Assessment and plan of treatment:	You are being treated with steroids for an acute flare of gout which have raised your blood sugar levels. You required insulin injections in the hospital to lower you blood sugar. Please continue insulin as needed to maintain your blood sugars within the normal range as long as you are taking the steroids.  Secondary Diagnosis:	Impaired mobility  Goal:	Got to subacute rehab  Assessment and plan of treatment:	You have having impaired mobility within your home due to your gouty arthritis and joint degeneration. Please go to subacute rehab for muscle strengthening and overall improve conditioning.  Secondary Diagnosis:	ZAHRAA (acute kidney injury)  Goal:	Monitoring  Assessment and plan of treatment:	Your kidney function was decreased at the time of your hospitalization. Your kidney function has since improved to normal with intravenous fluids. Principal Discharge DX:	Gout flare  Goal:	Continue steroid taper as prescribed  Assessment and plan of treatment:	You were hospitalized for severe pain in the right knee making if difficult for you to ambulate. Fluid was removed from your right knee joint and tested negative for infection but contained uric acid crystals indicative of gout. You were treated for a flare of your known gouty arthritis with IV and then oral steroids. Please continue to take prednisone as prescribed to reduce inflammation in your right knee. Please follow up with your primary medical doctor to discuss starting urate-lowering therapy such as allopurinol which helps to lower blood levels of uric acid and decreases formation/deposition of uric acid crystals in the joints.  Secondary Diagnosis:	Hyperglycemia, drug-induced  Goal:	Continue basal bolus insulin as needed  Assessment and plan of treatment:	You are being treated with steroids for an acute flare of gout which have raised your blood sugar levels. You required insulin injections in the hospital to lower you blood sugar. Please continue insulin as needed to maintain your blood sugars within the normal range as long as you are taking the steroids.  Secondary Diagnosis:	Impaired mobility  Goal:	Got to subacute rehab  Assessment and plan of treatment:	You have having impaired mobility within your home due to your gouty arthritis and joint degeneration. Please go to subacute rehab for muscle strengthening and overall improve conditioning.  Secondary Diagnosis:	ZAHRAA (acute kidney injury)  Goal:	Monitoring  Assessment and plan of treatment:	Your kidney function was decreased at the time of your hospitalization. Your kidney function has since improved to normal with intravenous fluids.

## 2018-12-13 NOTE — PROGRESS NOTE ADULT - PROBLEM SELECTOR PLAN 5
C/f hypothyroidism contributing to memory loss / confusion / slowed mentation.   - TSH wnl, free T4 wnl  - c/w synthroid 75 mcg.
C/f hypothyroidism contributing to memory loss / confusion / slowed mentation.  - c/w synthroid 75 mcg.  - f/u TSH, T3, free T4
c/w simvastatin  SQH  Diabetic and DASH diet  PT  Dispo: pt and pt's son agreeable to [facility] (recommended by PT). pt's son would prefer a [facility] closer to [place] (where they live) but are amenable to other rehab locations around [facility].

## 2018-12-13 NOTE — PROGRESS NOTE ADULT - PROBLEM SELECTOR PROBLEM 4
Urinary incontinence
Late onset Alzheimer's disease without behavioral disturbance
Urinary incontinence

## 2018-12-13 NOTE — DISCHARGE NOTE ADULT - CONDITIONS AT DISCHARGE
Patient is alert , oriented x 3with periods of confusion; both Knees are swollen 1+ edema ; glucose is stable in the 200's partial to complete assistance for her daily care; walks with one person assistances.  She does have a Right Buttock Fold skin Tear with open to air.  She has no additional  complaints at this time. The  Summary is sent with her to the Facility.

## 2018-12-13 NOTE — PROGRESS NOTE ADULT - PROBLEM SELECTOR PROBLEM 2
ZAHRAA (acute kidney injury)
Type 2 diabetes mellitus with hyperglycemia, without long-term current use of insulin
ZAHRAA (acute kidney injury)

## 2018-12-13 NOTE — PROGRESS NOTE ADULT - PROBLEM SELECTOR PLAN 1
Pt p/w knee pain, difficulty walking, found to be tachycardic, labs notable for leukocytosis to 34.45 on admission.  - S/p R knee tap. 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   - was started on solumedrol IV 30mg for crystal arthropathy, now transitioning to PO prednisone taper  - holding off on NSAIDs for now, given age and risks of adverse effects
Pt p/w knee pain, difficulty walking, found to be tachycardic, labs notable for leukocytosis to 34.45 on admission.  - X-ray of R-knee significant for "Tricompartmental joint space narrowing and degenerative changes compatible with osteoarthritis. Trace to small joint effusion."  - S/p R knee tap. 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   - Septic joint lower on differential, as synovial fluid gram stain negative for organisms. Antibiotics d/c'd for now, but will f/u fluid culture results  - solumedrol IV 30mg for crystal arthropathy.   - holding off on NSAIDs for now, given ZAHRAA
Pt p/w knee pain, difficulty walking, found to be tachycardic, labs notable for leukocytosis to 34.45 on admission. S/p R knee tap. 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   - Transitioned from IV steroids to PO prednisone taper. Pain improved since starting steroids. Pt with expected glucocorticoid-induced leukocytosis and hyperglycemia; will continue to monitor and adjust insulin.  - holding off on NSAIDs for now, given age and risks of adverse effects  - per son, pt has never been formally diagnosed with gout in the past, although she has had similar (less severe) episodes of polyarticular joint pain suspicious for crystal arthropathy. Pt has never been on a uric acid lowering agent

## 2018-12-13 NOTE — DISCHARGE NOTE ADULT - MEDICATION SUMMARY - MEDICATIONS TO TAKE
I will START or STAY ON the medications listed below when I get home from the hospital:    predniSONE  -- 20 milligram(s) by mouth once on 12/14  10 milligram(s) by mouth once on 12/15  10 milligram(s) by mouth once on 12/16  5 milligram(s) by mouth once on 12/17  -- Indication: For Crystal arthropathy    lisinopril 2.5 mg oral tablet  -- 1 tab(s) by mouth once a day  -- Indication: For HTN (hypertension)    mirtazapine 15 mg oral tablet  -- 1 tab(s) by mouth once a day (at bedtime)  -- Indication: For Late onset Alzheimer's disease without behavioral disturbance    insulin glargine  -- 8 unit(s) subcutaneous once a day (at bedtime)  -- Indication: For Hyperglycemia, drug-induced    metFORMIN 500 mg oral tablet, extended release  -- 1 tab(s) by mouth once a day  -- Indication: For Type 2 diabetes mellitus with hyperglycemia, without long-term current use of insulin    simvastatin 40 mg oral tablet  -- 1 tab(s) by mouth once a day (at bedtime)  -- Indication: For HLD (hyperlipidemia)    QUEtiapine 25 mg oral tablet  -- 1 tab(s) by mouth once a day  -- Indication: For Late onset Alzheimer's disease without behavioral disturbance    donepezil 10 mg oral tablet  -- 1 tab(s) by mouth once a day (at bedtime)  -- Indication: For Late onset Alzheimer's disease without behavioral disturbance    levothyroxine 75 mcg (0.075 mg) oral tablet  -- 1 tab(s) by mouth once a day  -- Indication: For Hypothyroidism

## 2018-12-13 NOTE — DISCHARGE NOTE ADULT - HOSPITAL COURSE
Patient is a 73 year old female with gouty arthritis, HTN, Alzheimer's dementia and hypothyroidism who presented for fatigue and difficulty walking/weight-bearing for one day. Vitals stable on presentation. Laboratory studies revealed leukocytosis. Right knee x-ray was ordered for complaint of right knee pain and swelling however revealed no acute pathology. Right knee effusion was appreciated on exam and aspirated by Orthopaedic surgery in the ED. Patient received ceftriaxone and Solu-Medrol 125 IV prior to transfer to Medicine floor. Synovial fluid studies were consistent with gout and oral prednisone taper was started. Patient remained Patient is a 73 year old female with gouty arthritis, HTN, Alzheimer's dementia and hypothyroidism who presented for fatigue and difficulty walking/weight-bearing for one day. Vitals stable on presentation. Laboratory studies revealed leukocytosis and elevated serum creatinine. Right knee x-ray was ordered for complaint of right knee pain and swelling however revealed no acute pathology. Right knee effusion was appreciated on exam and aspirated by Orthopaedic surgery in the ED. Patient received ceftriaxone and Solu-Medrol 125 IV prior to transfer to Medicine floor. Synovial fluid studies were consistent with gout and oral prednisone taper was started. Acute kidney injury resolved with IV fluids. Right knee pain and swelling improved and patient was able to participate in Physical Therapy evaluation. Subacute rehabilitation ([facility]) was recommended given impaired mobility. Patient was discharged on prednisone taper (4 days remaining) and uric acid-lowering therapy and advised to follow up with primary medical doctor upon discharge from [facility] for further monitoring of gouty arthritis.

## 2018-12-13 NOTE — PROGRESS NOTE ADULT - ATTENDING COMMENTS
Pt seen and examined, chart and labs reviewed.  Pt feels well, able to ambulate with less difficulty, denies pain in joints.  Pt aware of arrangement of [facility] and in agreement.   Pt medically stable for d/c once [facility] arranged.   Pt will need strict FSG monitoring while on steroid taper, pt will need PMD f/u to initiate urate lowering therapy.
Pt seen and examined, chart and labs reviewed.  Agree with PGY-1 note as above. Pt unreliable historian, remains confused, doesn't know where she is or why she's here.  Pt now s/p arthrocentesis, consistent with crystal arthropathy, crystal analysis still ongoing.  Will give Solumedrol 30mg IV x1 today.  Low suspicion of septic joint, so far fluid cultures negative, remains afebrile.

## 2018-12-13 NOTE — PROGRESS NOTE ADULT - PROBLEM SELECTOR PLAN 2
Likely pre-renal.  Improved with hydration.  - restart home lisinopril, given recovery in kidney function and recent BPs  - trend Cr
Pt on metformin at home. Now with glucocorticoid-induced hyperglycemia.  - hold metformin  - ISS  - Lantus and Humalog added  - CTM FSs  - Diabetic diet as tolerated
Pt presenting with ZAHRAA, Cr 1.80, from baseline ~ 1.  Likely pre-renal. Given that UA is neg, unlikely UTI  - will obtain bladder scan to ensure no post obstructive nephropathy.  - c/w LR @ 75 after 1L NS bolus. (s/p 1 liter IVF in the ED)  - hold lisinopril  - trend Cr  - hold NSAIDs for crystal arthropathy for now, pending recovery in renal function

## 2018-12-13 NOTE — DISCHARGE NOTE ADULT - PATIENT PORTAL LINK FT
You can access the [facility] Patient Portal, offered by [facility], by registering with the following website: [email or web address]

## 2018-12-13 NOTE — DISCHARGE NOTE ADULT - PLAN OF CARE
Continue steroid taper as prescribed You were hospitalized for severe pain in the right knee making if difficult for you to ambulate. Fluid was removed from your right knee joint and tested negative for infection but contained uric acid crystals indicative of gout. You were treated for a flare of your known gouty arthritis with IV and then oral steroids. Please continue to take prednisone as prescribed to reduce inflammation in your right knee. Please take allopurinol which helps to lower blood levels of uric acid and decreases formation of uric acid crystals. Continue basal bolus insulin as needed You are being treated with steroids for an acute flare of gout which have raised your blood sugar levels. You required insulin injections in the hospital to lower you blood sugar. Please continue insulin as needed to maintain your blood sugars within the normal range as long as you are taking the steroids. Got to subacute rehab You have having impaired mobility within your home due to your gouty arthritis and joint degeneration. Please go to subacute rehab for muscle strengthening and overall improve conditioning. Monitoring Your kidney function was decreased at the time of your hospitalization. Your kidney function has since improved to normal with intravenous fluids. You were hospitalized for severe pain in the right knee making if difficult for you to ambulate. Fluid was removed from your right knee joint and tested negative for infection but contained uric acid crystals indicative of gout. You were treated for a flare of your known gouty arthritis with IV and then oral steroids. Please continue to take prednisone as prescribed to reduce inflammation in your right knee. Please follow up with your primary medical doctor to discuss starting urate-lowering therapy such as allopurinol which helps to lower blood levels of uric acid and decreases formation/deposition of uric acid crystals in the joints.

## 2018-12-13 NOTE — PROGRESS NOTE ADULT - PROBLEM SELECTOR PLAN 3
Pt on metformin at home. Now with glucocorticoid-induced hyperglycemia.  - hold metformin  - ISS  - Lantus and Humalog added  - CTM FSs  - Diabetic diet as tolerated
C/f hypothyroidism contributing to memory loss / confusion / slowed mentation.   - TSH wnl, free T4 wnl  - c/w synthroid 75 mcg.
Glucose on CMP = 251; AG = 21, FS = 130; pt on metformin at home  - hold metformin  - IVF hydration  - ISS  - Diabetic diet as tolerated  - f/u for improvement in FS, AG  - f/u HgbA1c level in the AM

## 2018-12-13 NOTE — PROGRESS NOTE ADULT - ASSESSMENT
73F, PMH HTN, hypothyroidism, DM2, Alzhiemer's dementia with depression, presenting with AMS, urinary incontinence, and 1 day of difficulty ambulating. R knee found to have effusion, which was tapped. Synovial fluid analysis suggestive of crystal arthropathy.
73F, PMH HTN, hypothyroidism, DM2, Alzhiemer's dementia with depression, presenting with AMS, urinary incontinence, and 1 day of difficulty ambulating. Synovial fluid analysis from R knee suggestive of crystal arthropathy. Improving on steroids.
73F, PMH HTN, hypothyroidism, DM2, Alzhiemer's dementia with depression, presenting with AMS, urinary incontinence, and 1 day of difficulty ambulating. Synovial fluid analysis from R knee suggestive of crystal arthropathy. Improving on steroids.

## 2018-12-13 NOTE — PROGRESS NOTE ADULT - PROBLEM SELECTOR PLAN 4
Pt with urinary incontinence.  DDx includes stress vs NPH vs dementia.  Given that pt does not have any weakness in LE, less likely neurogenic causes.  - CTH negative for signs of NPH  - Per son, pt has had gradually worsening bowel and bladder incontinence over long period of time  - Given hx per son and normal Head CT, urinary incontinence most likely part of a constellation of symptoms of pt's dementia progressing over time
Pt with progressive dementia, followed by neurology  - c/w donepezil and mirtazepine   - continuing with quetiapine  - CT with no acute pathology  - Social work consult placed for help for family
Pt with urinary incontinence.  DDx includes stress vs NPH vs dementia.  Given that pt does not have any weakness in LE, less likely neurogenic causes.  - Bladder scan to ensure pt is not retaining  - f/u CTH for signs of NPH

## 2018-12-13 NOTE — PROGRESS NOTE ADULT - REASON FOR ADMISSION
Leukocytosis and ZAHRAA

## 2018-12-13 NOTE — PROGRESS NOTE ADULT - SUBJECTIVE AND OBJECTIVE BOX
Ortho Progress Note    S: Patient seen and examined. No acute events overnight. Pain well controlled. Denies fevers/chills/N/V.   States knee pain has improved.    O:  Physical Exam:  Gen: Laying in bed, NAD, alert and oriented.   Resp: Unlabored breathing  R Ext: EHL/FHL/TA/Sol intact          + SILT DP/SP/JACQUES/Sa          +DP, extremity WWP  Ranging R knee 0-100 deg with minimal pain. Crepitus with ROM. Mild knee effusion. No erythema or deformities.    Vital Signs Last 24 Hrs  T(C): 36.4 (11 Dec 2018 05:30), Max: 36.7 (10 Dec 2018 19:39)  T(F): 97.5 (11 Dec 2018 05:30), Max: 98.1 (10 Dec 2018 19:39)  HR: 70 (11 Dec 2018 05:30) (65 - 94)  BP: 118/61 (11 Dec 2018 05:30) (100/56 - 125/55)  BP(mean): --  RR: 17 (11 Dec 2018 05:30) (16 - 18)  SpO2: 97% (11 Dec 2018 05:30) (96% - 99%)                          12.6   24.45 )-----------( 262      ( 10 Dec 2018 19:50 )             38.7       12-10    134<L>  |  90<L>  |  65<H>  ----------------------------<  251<H>  4.6   |  23  |  1.80<H>      Gram stain negative.      A/P  73yFemale w/ r/o R septic knee, physical examination is not impressive for R knee septic arthritis. Gram stain negative. Will await results of knee arthrocentesis to make final determination.     - Gram stain negative  - Pain control  - WBAT on affected extremity  - FU labs, cell count, crystals, culture  - PT/OT   - Med mgmt per primary team  - Patient should be kept NPO pending synovial fluid analysis
Patient is a 73y old  Female who presents with a chief complaint of Leukocytosis and ZAHRAA (11 Dec 2018 06:13)     INTERVAL HPI/OVERNIGHT EVENTS: No acute events overnight. This am, pt says she feels well. Cannot recall events which led to her hospitalization. Cannot recall events in the ED or recent procedures, such as synovial fluid tap.   Denies fevers at home. Denies headache, vision problems, balance problems, chest pain, SOB, abd pain, nausea, vomiting, diarrhea, leg pain, knee pain, difficulty walking, bowel or bladder incontinence.  Patient may not be a reliable historian.    REVIEW OF SYSTEMS:  see above    MEDICATIONS  (STANDING):  aztreonam  IVPB      aztreonam  IVPB 500 milliGRAM(s) IV Intermittent every 12 hours  dextrose 5%. 1000 milliLiter(s) (50 mL/Hr) IV Continuous <Continuous>  dextrose 50% Injectable 12.5 Gram(s) IV Push once  dextrose 50% Injectable 25 Gram(s) IV Push once  dextrose 50% Injectable 25 Gram(s) IV Push once  heparin  Injectable 5000 Unit(s) SubCutaneous every 8 hours  insulin lispro (HumaLOG) corrective regimen sliding scale   SubCutaneous three times a day before meals  insulin lispro (HumaLOG) corrective regimen sliding scale   SubCutaneous at bedtime  lactated ringers. 1000 milliLiter(s) (75 mL/Hr) IV Continuous <Continuous>  levothyroxine 75 MICROGram(s) Oral daily  lidocaine   Patch 1 Patch Transdermal daily  lidocaine   Patch 1 Patch Transdermal daily  QUEtiapine 25 milliGRAM(s) Oral daily  simvastatin 40 milliGRAM(s) Oral at bedtime    MEDICATIONS  (PRN):  acetaminophen   Tablet .. 650 milliGRAM(s) Oral every 6 hours PRN Temp greater or equal to 38C (100.4F), Mild Pain (1 - 3), Moderate Pain (4 - 6)  dextrose 40% Gel 15 Gram(s) Oral once PRN Blood Glucose LESS THAN 70 milliGRAM(s)/deciliter  glucagon  Injectable 1 milliGRAM(s) IntraMuscular once PRN Glucose LESS THAN 70 milligrams/deciliter  oxyCODONE    5 mG/acetaminophen 325 mG 1 Tablet(s) Oral every 6 hours PRN Moderate Pain (4 - 6)    Allergies:  eggs (Unknown)  penicillins (Unknown)      VITAL SIGNS:  T(C): 36.4 (12-11-18 @ 05:30), Max: 36.7 (12-10-18 @ 19:39)  HR: 70 (12-11-18 @ 05:30) (65 - 94)  BP: 118/61 (12-11-18 @ 05:30) (100/56 - 125/55)  RR: 17 (12-11-18 @ 05:30) (16 - 18)  SpO2: 97% (12-11-18 @ 05:30) (96% - 99%)    PHYSICAL EXAM:  General: NAD.  Neuro: AAOx0-1. Confused, forgetful  HEENT: Normocephalic, atraumatic. EOMI. PERRL. Conjunctiva and sclera clear. Mucous membranes moist, without lesions. Neck supple. No JVD. Soft / rubbery nontender mass on anterior neck, easily palpable.  Resp: Non-labored breathing on room air. Clear to auscultation bilaterally; no wheeze  Cardiovascular: Regular rate and rhythm; no murmurs  Abd: +BS, soft, nontender, obese but nondistended  Ext:  No LE edema. R knee mildly swollen and mildly warm to touch, but not erythematous and nontender to palpation  Skin: Warm, dry. Superficial erosion in gluteal fold    LABS:                        10.2   16.84 )-----------( 272      ( 11 Dec 2018 05:00 )             31.3     10 Dec 2018 19:50    134    |  90     |  65     ----------------------------<  251    4.6     |  23     |  1.80     Ca    9.8        10 Dec 2018 19:50  Phos  3.3       11 Dec 2018 05:00  Mg     2.1       11 Dec 2018 05:00    TPro  8.2    /  Alb  3.4    /  TBili  0.4    /  DBili  x      /  AST  13     /  ALT  8      /  AlkPhos  99     10 Dec 2018 19:50  PT/INR - ( 11 Dec 2018 05:00 )   PT: 12.3 SEC;   INR: 1.10          PTT - ( 11 Dec 2018 05:00 )  PTT:24.0 SEC  CAPILLARY BLOOD GLUCOSE      POCT Blood Glucose.: 180 mg/dL (11 Dec 2018 12:29)  POCT Blood Glucose.: 166 mg/dL (11 Dec 2018 10:15)  POCT Blood Glucose.: 166 mg/dL (11 Dec 2018 09:04)  POCT Blood Glucose.: 196 mg/dL (11 Dec 2018 06:28)    BLOOD CULTURE    RADIOLOGY & ADDITIONAL TESTS:    Synovial Fluid analysis:   turbid davin fluid  36,017 WBCs total  36,000 RBCs  Crystals present    Gram stain of synovial fluid negative for organisms    Consultant(s) Notes Reviewed:  Ortho    ----------------------------------------  CONTACT: Ernestina Lopes  Internal Medicine, PGY-1  [facility] Pager: [phone]  [facility] Pager [phone]  -----------------------------------------
Patient is a 73y old  Female who presents with a chief complaint of Leukocytosis and ZAHRAA (12 Dec 2018 16:44)     INTERVAL HPI/OVERNIGHT EVENTS: No acute events overnight.   This morning, pt says she feels well. Reports minimal L knee pain and minimal b/l ankle pain. Says they feel stiff and only hurt when she tries to move them.  Denies fevers, chills, cp, sob, abd pain, n/v/d.    REVIEW OF SYSTEMS:  see above    MEDICATIONS  (STANDING):  dextrose 5%. 1000 milliLiter(s) (50 mL/Hr) IV Continuous <Continuous>  dextrose 50% Injectable 12.5 Gram(s) IV Push once  dextrose 50% Injectable 25 Gram(s) IV Push once  dextrose 50% Injectable 25 Gram(s) IV Push once  donepezil 10 milliGRAM(s) Oral at bedtime  heparin  Injectable 5000 Unit(s) SubCutaneous every 8 hours  insulin glargine Injectable (LANTUS) 4 Unit(s) SubCutaneous at bedtime  insulin lispro (HumaLOG) corrective regimen sliding scale   SubCutaneous three times a day before meals  insulin lispro (HumaLOG) corrective regimen sliding scale   SubCutaneous at bedtime  insulin lispro Injectable (HumaLOG) 2 Unit(s) SubCutaneous three times a day with meals  lactated ringers. 1000 milliLiter(s) (75 mL/Hr) IV Continuous <Continuous>  levothyroxine 75 MICROGram(s) Oral daily  lidocaine   Patch 1 Patch Transdermal daily  lidocaine   Patch 1 Patch Transdermal daily  lidocaine   Patch 2 Patch Transdermal daily  lisinopril 2.5 milliGRAM(s) Oral daily  mirtazapine 15 milliGRAM(s) Oral at bedtime  predniSONE   Tablet   Oral   QUEtiapine 25 milliGRAM(s) Oral daily  simvastatin 40 milliGRAM(s) Oral at bedtime    MEDICATIONS  (PRN):  acetaminophen   Tablet .. 650 milliGRAM(s) Oral every 6 hours PRN Temp greater or equal to 38C (100.4F), Mild Pain (1 - 3), Moderate Pain (4 - 6)  dextrose 40% Gel 15 Gram(s) Oral once PRN Blood Glucose LESS THAN 70 milliGRAM(s)/deciliter  glucagon  Injectable 1 milliGRAM(s) IntraMuscular once PRN Glucose LESS THAN 70 milligrams/deciliter  oxyCODONE    5 mG/acetaminophen 325 mG 1 Tablet(s) Oral every 6 hours PRN Moderate Pain (4 - 6)    Allergies:  eggs (Unknown)  penicillins (Unknown)    Intolerances:      VITAL SIGNS:  T(C): 36.4 (12-13-18 @ 05:48), Max: 36.9 (12-12-18 @ 09:45)  HR: 61 (12-13-18 @ 05:48) (61 - 88)  BP: 131/59 (12-13-18 @ 05:48) (129/56 - 138/70)  RR: 17 (12-13-18 @ 05:48) (16 - 17)  SpO2: 98% (12-13-18 @ 05:48) (95% - 100%)    PHYSICAL EXAM:  General: NAD.  Neuro: AAOx0-1. Confused, forgetful  HEENT: Normocephalic, atraumatic. EOMI. PERRL. Conjunctiva and sclera clear. Mucous membranes moist, without lesions. Neck supple. No JVD. Soft / rubbery nontender mass on anterior neck, easily palpable.  Resp: Non-labored breathing on room air. Clear to auscultation bilaterally; no wheeze  Cardiovascular: Regular rate and rhythm; no murmurs  Abd: +BS, soft, nontender, obese but nondistended  Ext:  No LE edema. R knee mildly swollen and mildly warm to touch, but not erythematous and nontender to palpation. L knee moderately swollen, but not erythematous and nontender to palpation. Bilateral ankles not swollen, not erythematous, nontender.   Skin: Warm, dry. Superficial erosion in gluteal fold    LABS:                        9.4    12.27 )-----------( 300      ( 13 Dec 2018 06:31 )             29.3     13 Dec 2018 06:31    136    |  102    |  30     ----------------------------<  212    4.3     |  24     |  0.96     Ca    8.5        13 Dec 2018 06:31      CAPILLARY BLOOD GLUCOSE      POCT Blood Glucose.: 208 mg/dL (13 Dec 2018 08:40)  POCT Blood Glucose.: 262 mg/dL (12 Dec 2018 22:09)  POCT Blood Glucose.: 302 mg/dL (12 Dec 2018 18:16)  POCT Blood Glucose.: 226 mg/dL (12 Dec 2018 12:49)    BLOOD CULTURE  12-10 @ 22:54 --    NO ORGANISMS ISOLATED  NO ORGANISMS ISOLATED AT 48 HRS.  --        ----------------------------------------  CONTACT: Ernestina Lopes  Internal Medicine, PGY-1  [facility] Pager: [phone]  [facility] Pager [phone]  -----------------------------------------
Patient seen and examined.  No acute events overnight.  Pain well controlled.     Vital Signs Last 24 Hrs  T(C): 36.4 (12 Dec 2018 05:33), Max: 36.6 (11 Dec 2018 14:46)  T(F): 97.5 (12 Dec 2018 05:33), Max: 97.9 (11 Dec 2018 14:46)  HR: 67 (12 Dec 2018 05:33) (67 - 80)  BP: 136/59 (12 Dec 2018 05:33) (116/51 - 136/59)  BP(mean): --  RR: 17 (12 Dec 2018 05:33) (17 - 18)  SpO2: 97% (12 Dec 2018 05:33) (95% - 97%)                          10.2   16.84 )-----------( 272      ( 11 Dec 2018 05:00 )             31.3   12-10    134<L>  |  90<L>  |  65<H>  ----------------------------<  251<H>  4.6   |  23  |  1.80<H>    Ca    9.8      10 Dec 2018 19:50  Phos  3.3     12-11  Mg     2.1     12-11    TPro  8.2  /  Alb  3.4  /  TBili  0.4  /  DBili  x   /  AST  13  /  ALT  8   /  AlkPhos  99  12-10      Exam:  Gen: NAD  RLE:  No warm erythema or swelling of R knee  Motor: 5/5 EHL/FHL/TA/Gastrocnemius  Sensory: SILT DP/SP/S/S/T nerve distributions  Vascular: 2+ Dorsalis Pedis pulse        Assessment/Plan:  This is a 73yFemale admitted for R knee pain, ZAHRAA, and fatigue.     -pain control  -PT  -WBAT  -OOB  -DVT ppx  -dispo plan  -Aspirations results suggest septic R knee unlikely   -Will FU R knee asp cultures  -No acute orthopedic intervention at this time
Patient is a 73y old  Female who presents with a chief complaint of Leukocytosis and ZAHRAA (12 Dec 2018 06:13)     INTERVAL HPI/OVERNIGHT EVENTS: No acute events overnight. This morning, pt says she is "not too bad". Describes pain in bilateral knees, L > R, and bilateral ankles, which is "not too bothersome". No new complaints. Endorses good appetite. Of note, pt is a poor historian with poor short term memory.     Update: Spoke with son. Says that patient has had episodes of joint pain similar to this in the past, although less severe, and involving various other joints such as the wrist. He notes that this most recent episode was the most severe episode by far, and this was also the first time any of her joints have been tapped. He reports that her PCP / other doctors have suggested gout as a possible cause of her intermittent joint pain, but it had never been formally diagnosed and she was never started on any long-term medication for gout.     REVIEW OF SYSTEMS:  Constitutional: Denies fever, weight loss, fatigue  Neuro: Denies HA, weakness, numbness  Resp: Denies SOB, cough, hemoptysis  CV: Denies chest pain, palpitations, dizziness  GI: Denies abdominal pain, N/V/D/C, melena, hematochezia  : Denies dysuria, increased frequency, hematuria  Skin: Denies rashes, lesions  MSK: see HPI    MEDICATIONS  (STANDING):  dextrose 5%. 1000 milliLiter(s) (50 mL/Hr) IV Continuous <Continuous>  dextrose 50% Injectable 12.5 Gram(s) IV Push once  dextrose 50% Injectable 25 Gram(s) IV Push once  dextrose 50% Injectable 25 Gram(s) IV Push once  donepezil 10 milliGRAM(s) Oral at bedtime  heparin  Injectable 5000 Unit(s) SubCutaneous every 8 hours  insulin glargine Injectable (LANTUS) 4 Unit(s) SubCutaneous at bedtime  insulin lispro (HumaLOG) corrective regimen sliding scale   SubCutaneous three times a day before meals  insulin lispro (HumaLOG) corrective regimen sliding scale   SubCutaneous at bedtime  insulin lispro Injectable (HumaLOG) 2 Unit(s) SubCutaneous three times a day with meals  lactated ringers. 1000 milliLiter(s) (75 mL/Hr) IV Continuous <Continuous>  levothyroxine 75 MICROGram(s) Oral daily  lidocaine   Patch 1 Patch Transdermal daily  lidocaine   Patch 1 Patch Transdermal daily  lidocaine   Patch 2 Patch Transdermal daily  mirtazapine 15 milliGRAM(s) Oral at bedtime  predniSONE   Tablet   Oral   predniSONE   Tablet 30 milliGRAM(s) Oral daily  QUEtiapine 25 milliGRAM(s) Oral daily  simvastatin 40 milliGRAM(s) Oral at bedtime    MEDICATIONS  (PRN):  acetaminophen   Tablet .. 650 milliGRAM(s) Oral every 6 hours PRN Temp greater or equal to 38C (100.4F), Mild Pain (1 - 3), Moderate Pain (4 - 6)  dextrose 40% Gel 15 Gram(s) Oral once PRN Blood Glucose LESS THAN 70 milliGRAM(s)/deciliter  glucagon  Injectable 1 milliGRAM(s) IntraMuscular once PRN Glucose LESS THAN 70 milligrams/deciliter  oxyCODONE    5 mG/acetaminophen 325 mG 1 Tablet(s) Oral every 6 hours PRN Moderate Pain (4 - 6)    Allergies:  eggs (Unknown)  penicillins (Unknown)    Intolerances:      VITAL SIGNS:  T(C): 36.8 (12-12-18 @ 15:20), Max: 36.9 (12-12-18 @ 09:45)  HR: 88 (12-12-18 @ 15:20) (67 - 88)  BP: 138/70 (12-12-18 @ 15:20) (129/56 - 138/70)  RR: 17 (12-12-18 @ 15:20) (16 - 18)  SpO2: 95% (12-12-18 @ 15:20) (95% - 97%)    PHYSICAL EXAM:  General: NAD.  Neuro: AAOx0-1. Confused, forgetful  HEENT: Normocephalic, atraumatic. EOMI. PERRL. Conjunctiva and sclera clear. Mucous membranes moist, without lesions. Neck supple. No JVD. Soft / rubbery nontender mass on anterior neck, easily palpable.  Resp: Non-labored breathing on room air. Clear to auscultation bilaterally; no wheeze  Cardiovascular: Regular rate and rhythm; no murmurs  Abd: +BS, soft, nontender, obese but nondistended  Ext:  No LE edema. Bilateral knees mildly swollen and mildly warm to touch, L > R, but not erythematous and nontender to palpation. Bilateral ankles with no erythema, edema, and are mildly tender to palpation  Skin: Warm, dry. Superficial erosion in gluteal fold    LABS:                        11.0   9.57  )-----------( 256      ( 12 Dec 2018 05:20 )             34.5     12 Dec 2018 05:20    134    |  97     |  44     ----------------------------<  300    4.9     |  24     |  1.08     Ca    9.0        12 Dec 2018 05:20  Phos  2.8       12 Dec 2018 05:20  Mg     2.1       12 Dec 2018 05:20    TPro  6.9    /  Alb  3.2    /  TBili  < 0.2  /  DBili  x      /  AST  14     /  ALT  9      /  AlkPhos  81     12 Dec 2018 05:20    CAPILLARY BLOOD GLUCOSE      POCT Blood Glucose.: 226 mg/dL (12 Dec 2018 12:49)  POCT Blood Glucose.: 303 mg/dL (12 Dec 2018 08:59)  POCT Blood Glucose.: 227 mg/dL (11 Dec 2018 22:15)  POCT Blood Glucose.: 144 mg/dL (11 Dec 2018 18:16)    BLOOD CULTURE  12-10 @ 22:54 --    NO ORGANISMS ISOLATED  NO ORGANISMS ISOLATED AT 24 HOURS  --    RADIOLOGY:    EXAM:  CT BRAIN        PROCEDURE DATE:  Dec 11 2018         INTERPRETATION:  HISTORY: Senile dementia.ADMDIAG1: N17.9 N17.9/    Technique: Noncontrast CT of the head was performed.    Multiple contiguous axial images were acquired from the skull base to the   vertex without the administration of intravenous contrast. Coronal and   sagittal reformations were made.    COMPARISON: MR brain dated 4/4/2018.    FINDINGS:  There has been no interval change allowing for differences in technique.    Mild prominence of the sulci and ventricles are consistent with   age-appropriate volume loss. There are hemispheric white matter areas of   low attenuation which are nonspecific but likely related to sequelae of   microvascular disease. There is no intraparenchymal hematoma, mass effect   or midline shift. No abnormal extra-axial fluid collections are present.   There is no evidence of acute transcortical territorial infarction.    The calvarium is intact. The visualized intraorbital compartments,   paranasal sinuses and tympanomastoid cavities appear free of acute   disease.    IMPRESSION:  Microvascular disease. No acute intracranial hemorrhage, mass or   hydrocephalus.              Consultant(s) Notes Reviewed: Ortho      ----------------------------------------  CONTACT: Ernestina Lopes  Internal Medicine, PGY-1  [facility] Pager: [phone]  [facility] Pager [phone]  -----------------------------------------

## 2018-12-15 LAB
BACTERIA BLD CULT: SIGNIFICANT CHANGE UP
BACTERIA BLD CULT: SIGNIFICANT CHANGE UP

## 2018-12-19 PROBLEM — E78.5 HYPERLIPIDEMIA, UNSPECIFIED: Chronic | Status: ACTIVE | Noted: 2018-12-10

## 2018-12-19 PROBLEM — E03.9 HYPOTHYROIDISM, UNSPECIFIED: Chronic | Status: ACTIVE | Noted: 2018-12-10

## 2018-12-19 PROBLEM — M19.90 UNSPECIFIED OSTEOARTHRITIS, UNSPECIFIED SITE: Chronic | Status: ACTIVE | Noted: 2018-12-10

## 2018-12-19 PROBLEM — I10 ESSENTIAL (PRIMARY) HYPERTENSION: Chronic | Status: ACTIVE | Noted: 2018-12-10

## 2018-12-19 PROBLEM — G30.9 ALZHEIMER'S DISEASE, UNSPECIFIED: Chronic | Status: ACTIVE | Noted: 2018-12-10

## 2018-12-25 LAB — BACTERIA FLD CULT: SIGNIFICANT CHANGE UP

## 2018-12-26 ENCOUNTER — MEDICATION RENEWAL (OUTPATIENT)
Age: 73
End: 2018-12-26

## 2018-12-26 RX ORDER — DONEPEZIL HYDROCHLORIDE 10 MG/1
10 TABLET ORAL
Qty: 90 | Refills: 0 | Status: ACTIVE | COMMUNITY
Start: 2017-09-28 | End: 1900-01-01

## 2018-12-28 ENCOUNTER — APPOINTMENT (OUTPATIENT)
Dept: GERIATRICS | Facility: CLINIC | Age: 73
End: 2018-12-28
Payer: MEDICARE

## 2018-12-28 VITALS
TEMPERATURE: 97.8 F | HEART RATE: 83 BPM | HEIGHT: 60 IN | OXYGEN SATURATION: 96 % | RESPIRATION RATE: 16 BRPM | DIASTOLIC BLOOD PRESSURE: 70 MMHG | SYSTOLIC BLOOD PRESSURE: 100 MMHG

## 2018-12-28 DIAGNOSIS — M10.9 GOUT, UNSPECIFIED: ICD-10-CM

## 2018-12-28 PROCEDURE — 99215 OFFICE O/P EST HI 40 MIN: CPT | Mod: GC

## 2018-12-28 RX ORDER — LISINOPRIL 5 MG/1
5 TABLET ORAL DAILY
Qty: 30 | Refills: 0 | Status: DISCONTINUED | COMMUNITY
Start: 2018-12-28 | End: 2018-12-28

## 2019-01-08 ENCOUNTER — OUTPATIENT (OUTPATIENT)
Dept: OUTPATIENT SERVICES | Facility: HOSPITAL | Age: 74
LOS: 1 days | Discharge: HOME | End: 2019-01-08
Payer: MEDICARE

## 2019-01-08 DIAGNOSIS — Z90.49 ACQUIRED ABSENCE OF OTHER SPECIFIED PARTS OF DIGESTIVE TRACT: Chronic | ICD-10-CM

## 2019-01-08 PROCEDURE — 90792 PSYCH DIAG EVAL W/MED SRVCS: CPT

## 2019-01-09 DIAGNOSIS — G30.9 ALZHEIMER'S DISEASE, UNSPECIFIED: ICD-10-CM

## 2019-01-23 ENCOUNTER — APPOINTMENT (OUTPATIENT)
Dept: GERIATRICS | Facility: CLINIC | Age: 74
End: 2019-01-23
Payer: MEDICARE

## 2019-01-23 VITALS
BODY MASS INDEX: 32.42 KG/M2 | SYSTOLIC BLOOD PRESSURE: 140 MMHG | WEIGHT: 166 LBS | OXYGEN SATURATION: 98 % | TEMPERATURE: 98.2 F | HEART RATE: 83 BPM | DIASTOLIC BLOOD PRESSURE: 72 MMHG

## 2019-01-23 DIAGNOSIS — I10 ESSENTIAL (PRIMARY) HYPERTENSION: ICD-10-CM

## 2019-01-23 DIAGNOSIS — D64.9 ANEMIA, UNSPECIFIED: ICD-10-CM

## 2019-01-23 DIAGNOSIS — E11.9 TYPE 2 DIABETES MELLITUS W/OUT COMPLICATIONS: ICD-10-CM

## 2019-01-23 DIAGNOSIS — E03.9 HYPOTHYROIDISM, UNSPECIFIED: ICD-10-CM

## 2019-01-23 DIAGNOSIS — F02.80 ALZHEIMER'S DISEASE, UNSPECIFIED: ICD-10-CM

## 2019-01-23 DIAGNOSIS — E78.5 HYPERLIPIDEMIA, UNSPECIFIED: ICD-10-CM

## 2019-01-23 DIAGNOSIS — G30.9 ALZHEIMER'S DISEASE, UNSPECIFIED: ICD-10-CM

## 2019-01-23 LAB
ALBUMIN SERPL ELPH-MCNC: 4.5 G/DL
ALP BLD-CCNC: 88 U/L
ALT SERPL-CCNC: 37 U/L
ANION GAP SERPL CALC-SCNC: 16 MMOL/L
AST SERPL-CCNC: 22 U/L
BASOPHILS # BLD AUTO: 0.08 K/UL
BASOPHILS NFR BLD AUTO: 0.7 %
BILIRUB SERPL-MCNC: 0.3 MG/DL
BUN SERPL-MCNC: 22 MG/DL
CALCIUM SERPL-MCNC: 10 MG/DL
CHLORIDE SERPL-SCNC: 100 MMOL/L
CO2 SERPL-SCNC: 27 MMOL/L
CREAT SERPL-MCNC: 1.03 MG/DL
EOSINOPHIL # BLD AUTO: 1.88 K/UL
EOSINOPHIL NFR BLD AUTO: 17.1 %
GLUCOSE SERPL-MCNC: 194 MG/DL
HCT VFR BLD CALC: 39.5 %
HGB BLD-MCNC: 12.4 G/DL
IMM GRANULOCYTES NFR BLD AUTO: 0.3 %
LYMPHOCYTES # BLD AUTO: 2.88 K/UL
LYMPHOCYTES NFR BLD AUTO: 26.1 %
MAN DIFF?: NORMAL
MCHC RBC-ENTMCNC: 28.9 PG
MCHC RBC-ENTMCNC: 31.4 GM/DL
MCV RBC AUTO: 92.1 FL
MONOCYTES # BLD AUTO: 0.85 K/UL
MONOCYTES NFR BLD AUTO: 7.7 %
NEUTROPHILS # BLD AUTO: 5.3 K/UL
NEUTROPHILS NFR BLD AUTO: 48.1 %
PLATELET # BLD AUTO: 334 K/UL
POTASSIUM SERPL-SCNC: 4.7 MMOL/L
PROT SERPL-MCNC: 7.1 G/DL
RBC # BLD: 4.29 M/UL
RBC # FLD: 14.9 %
SODIUM SERPL-SCNC: 143 MMOL/L
WBC # FLD AUTO: 11.02 K/UL

## 2019-01-23 PROCEDURE — 99215 OFFICE O/P EST HI 40 MIN: CPT | Mod: GC

## 2019-01-23 NOTE — REASON FOR VISIT
[Follow-Up] : a follow-up visit [Family Member] : family member [Pre-Visit Preparation] : pre-visit preparation was not done

## 2019-01-23 NOTE — PHYSICAL EXAM
[General Appearance - In No Acute Distress] : in no acute distress [Sclera] : the sclera and conjunctiva were normal [Extraocular Movements] : extraocular movements were intact [Oropharynx] : The oropharynx was normal [] : no respiratory distress [Auscultation Breath Sounds / Voice Sounds] : lungs were clear to auscultation bilaterally [Heart Rate And Rhythm] : heart rate was normal and rhythm regular [Heart Sounds] : normal S1 and S2 [Murmurs] : no murmurs [Abdomen Soft] : soft [Abdomen Tenderness] : non-tender [Abnormal Walk] : normal gait [Musculoskeletal - Swelling] : no joint swelling seen [Skin Color & Pigmentation] : normal skin color and pigmentation [No Focal Deficits] : no focal deficits [Affect] : the affect was normal [Mood] : the mood was normal

## 2019-01-23 NOTE — HISTORY OF PRESENT ILLNESS
[Mild] : Stage: Mild [Stable] : Status: Stable [Memory Lapses Or Loss] : stable memory impairment [Patient Observed To Be Agitated] : denies agitation [Hostility Toward Caregivers] : denies aggression [Sleep Disturbances] : denies sleep disturbances [] : denies wandering [Fixed Beliefs Contradicted By Reality (Delusions)] : denies delusions [Difficulty Finding Desired Words] : denies difficulty finding desired words [0] : 2) Feeling down, depressed, or hopeless: Not at all [FreeTextEntry1] : 73F with Alzheimers, gouty arthritis, diabetes A1c 7.5, HTN, and hypothyroidism.\par \par Last hospitalization Dec 2018 for UTI, ZAHRAA, gout flare up and d/c to [facility]. \par \par She is accompanied by her son (Ollie Daley [phone])\par \par Patient lives alone currently and family is working on setting up patient with HHA and daily care. [facility] coordinating HHA [facility]. \par \ariana Feels safe at home and able to ambulate without requiring assistance. Sleeps at least 6-7 hours a night. Eats three meals a day. GDS score 1. Follows with psych (Dr. Diaz) for depression. Recently seroquel was stopped and mirtazapine was increased to 15 mg qd. Continues to take synthroid and metformin and family assists with medications. No gout flare since last hospitalization. \par

## 2019-01-23 NOTE — REVIEW OF SYSTEMS
[Fever] : no fever [Chills] : no chills [Eye Pain] : no eye pain [Earache] : no earache [Sore Throat] : no sore throat [Chest Pain] : no chest pain [Cough] : no cough [SOB on Exertion] : no shortness of breath during exertion [Abdominal Pain] : no abdominal pain [Dysuria] : no dysuria [Arthralgias] : no arthralgias [Joint Pain] : no joint pain [Skin Lesions] : no skin lesions [Dizziness] : no dizziness [Fainting] : no fainting [Depression] : no depression [Easy Bleeding] : no tendency for easy bleeding

## 2019-01-23 NOTE — END OF VISIT
[] : Resident [FreeTextEntry3] : 72yo woman brought in today by her devoted son, Ollie Daley [phone], who reports she is quite stable at home, no agitation.   She was last hospitalized in Dec 2018 for UTI, ZAHRAA, gout flare up and d/c to [facility]. PMH: Alzheimers, gouty arthritis, diabetes A1c 7.5, HTN, and hypothyroidism.  Patient lives alone currently and family is working on setting up patient with HHA and daily care. Currently coordinating HHA services with [facility].   Feels safe at home and able to ambulate without requiring assistance. Sleeps at least 6-7 hours a night. Eats three meals a day. GDS score 1. Follows with psych (Dr. Diaz) for depression. Recently Seroquel was stopped and mirtazapine was increased to 15 mg qd. Continues to take Synthroid and metformin and family assists with medications. No gout flare since last hospitalization. \par On PE: smiling and pleasant woman in no distress and without any complaints. Lungs clear, heart RSR, no edema, no focal findinsg. Able to stand and ambulate independently, no dizziness. \par Plan: Discussed with son natural rpogresison.

## 2019-01-23 NOTE — ASSESSMENT
[FreeTextEntry1] : 73F with gouty arthritis, diabetes A1c 7.5, HTN, Alzheimer's dementia and hypothyroidism comes to clinic for f/u visit: \par \par #Alzheimer's dementia\par -family working on hiring daily HHA\par -MMSE exam at last visit 23, repeat at next visit \par -c/w mirtazapine and f/u with Dr. Diaz \par -not a candidate for clinical trial with neurology (unable to sign consent)\par \par #anemia\par -seen during last hospitalization \par -repeat CBC today \par \par #T2DM\par -c/w metformin 500 mg qd \par -a1c at next visit \par \par #Gout\par -c/w colchicine qd\par \par #hypothryoid]\par -c/w synthroid \par \par #Incontinence\par -chronic; likely related to bladder atonia in setting of long standing DM. \par -c/w daily depend use \par \par #HTN\par -controlled off BP meds. Lisinopril discontinued at last visit. \par \par #HCM\par -no flu shot due to egg allergy. \par -HHA private hire by family\par -has family near by for emergencies including neighbors and patient's sister. \par -HCP form and MOLST to be filled out at next visit. Instructed son to bring in MOLST form.\par \par Pepe Arce PGY3\par d/w Dr. Gray

## 2019-02-04 ENCOUNTER — APPOINTMENT (OUTPATIENT)
Dept: NEUROLOGY | Facility: CLINIC | Age: 74
End: 2019-02-04
Payer: MEDICARE

## 2019-02-04 VITALS
DIASTOLIC BLOOD PRESSURE: 72 MMHG | HEIGHT: 60 IN | HEART RATE: 79 BPM | WEIGHT: 166 LBS | BODY MASS INDEX: 32.59 KG/M2 | SYSTOLIC BLOOD PRESSURE: 144 MMHG

## 2019-02-04 PROCEDURE — 99214 OFFICE O/P EST MOD 30 MIN: CPT

## 2019-02-04 NOTE — HISTORY OF PRESENT ILLNESS
[FreeTextEntry1] : HPI-Interval Hx [id number]:\par She was just in hospital and rehab due to gout pain, now better. \par Per son, she is getting a bit worse, with STM.\par [facility] center 2[facility]week, bus picks her up.\par She is well monitored by family. They keep an eye on food and meds. \par She still lives alone.\par Psychiatrist planning to restart her on SSRI, not clear which one.\par \par HPI-Interval Hx [id number]:\par MRI and nPT indicative of MCI/AD.\par Stopped driving.\par Still not decided about clinical trials, here with her son, and would like to participate.\par Per son, she has worsened a lot in these few months. Mostly the short term memory.\par \par PMH:\par 71yo RH WW, with HTN, DM, hypothyroid and depression, here with her son for possible dementia.\par Pt has suffered from depression for a long time, and tried several different medications. She was following with Psych but has recently lost follow-up.\par For a few years, 1-2 max, family and pt has noted STM issues, gradually progressive. \par She has recently retired from her secretarial job, and is going back to work soon.\par Memory: mostly repeating things over and over. Not really affecting her life and job.\par Speech: ok.\par There appear not to be any frontal issues. \par Sleep: ok, now better on Mirtazapine. No snoring.\par Appetite: little appetite, forgets to eat. no recent weight loss.\par Few accidental falls in last years, has forgotten, possibly due to knee pain.\par Mood: c/o anxiety and depression, has used several different meds in the past, recently was on Paroxetine, stopped with no reason; has abused Xanax in the past, not taking now. Unclear why she is on Seroquel.\par ADl and IADL intact. Drives, no issues.

## 2019-02-04 NOTE — ASSESSMENT
[FreeTextEntry1] : Assessment:\par 73yo RH WW, with MCI, mostly amnestic. Depression is a component.\par NPT and MRI c/w AD.\par \par Impression:\par MCI/Mild AD.\par Depression.\par \par \par Plan:\par -will allow psychiatrist to restart SSRI, son will let me know when and what, and we'll decide then to increase Aricept to 15-23mg\par -encouraged to exercise, she will go with her sister.\par \par RTC in fall.\par \par A thorough discussion was entertained with the patient/caregiver regarding the use of psychoactive medications, their possible benefits and AE profile, including the risk of cardiovascular complications.\par We discussed the benefits of being active, physically and mentally, and the need to to establish a routine in this respect.\par Driving abilities and firearms possession and use were discussed, in relation to progression of the cognitive decline, and the need to assess them periodically.\par Patient/caregiver understand and agree with the plan.

## 2019-02-04 NOTE — PHYSICAL EXAM
[General Appearance - Alert] : alert [General Appearance - In No Acute Distress] : in no acute distress [Oriented To Time, Place, And Person] : oriented to person, place, and time [Impaired Insight] : insight and judgment were intact [Affect] : the affect was normal [Over the Past 2 Weeks, Have You Felt Down, Depressed, or Hopeless?] : 1.) Over the past 2 weeks, have you felt down, depressed, or hopeless? Yes [Over the Past 2 Weeks, Have You Felt Little Interest or Pleasure Doing Things?] : 2.) Over the past 2 weeks, have you felt little interest or pleasure doing things? Yes [Person] : oriented to person [Place] : oriented to place [Time] : oriented to time [Concentration Intact] : normal concentrating ability [Visual Intact] : visual attention was ~T not ~L decreased [Naming Objects] : no difficulty naming common objects [Repeating Phrases] : no difficulty repeating a phrase [Writing A Sentence] : no difficulty writing a sentence [Fluency] : fluency intact [Comprehension] : comprehension intact [Reading] : reading intact [Past History] : adequate knowledge of personal past history [Total Score ___ / 30] : the patient achieved a score of [unfilled] /30 [Date / Time ___ / 5] : date / time [unfilled] / 5 [Place ___ / 5] : place [unfilled] / 5 [Registration ___ / 3] : registration [unfilled] / 3 [Serial Sevens ___/5] : serial sevens [unfilled] / 5 [Naming 2 Objects ___ / 2] : naming two objects [unfilled] / 2 [Repeating a Sentence ___ / 1] : repeating a sentence [unfilled] / 1 [Writing a Sentence ___ / 1] : write sentence [unfilled] / 1 [3-stage Verbal Command ___ / 3] : three-stage verbal command [unfilled] / 3 [Written Command ___ / 1] : written command [unfilled] / 1 [Copy a Design ___ / 1] : copy a design [unfilled] / 1 [Recall ___ / 3] : recall [unfilled] / 3 [Cranial Nerves Optic (II)] : visual acuity intact bilaterally,  visual fields full to confrontation, pupils equal round and reactive to light [Cranial Nerves Oculomotor (III)] : extraocular motion intact [Cranial Nerves Trigeminal (V)] : facial sensation intact symmetrically [Cranial Nerves Facial (VII)] : face symmetrical [Cranial Nerves Vestibulocochlear (VIII)] : hearing was intact bilaterally [Cranial Nerves Glossopharyngeal (IX)] : tongue and palate midline [Cranial Nerves Accessory (XI - Cranial And Spinal)] : head turning and shoulder shrug symmetric [Cranial Nerves Hypoglossal (XII)] : there was no tongue deviation with protrusion [Motor Strength] : muscle strength was normal in all four extremities [Involuntary Movements] : no involuntary movements were seen [No Muscle Atrophy] : normal bulk in all four extremities [Motor Handedness Right-Handed] : the patient is right hand dominant [Sensation Tactile Decrease] : light touch was intact [Sensation Pain / Temperature Decrease] : pain and temperature was intact [Balance] : balance was intact [2+] : Brachioradialis left 2+ [1+] : Ankle jerk left 1+ [Sclera] : the sclera and conjunctiva were normal [PERRL With Normal Accommodation] : pupils were equal in size, round, reactive to light, with normal accommodation [Extraocular Movements] : extraocular movements were intact [Optic Disc Abnormality] : the optic disc were normal in size and color [No APD] : no afferent pupillary defect [No MIKI] : no internuclear ophthalmoplegia [Full Visual Field] : full visual field [Outer Ear] : the ears and nose were normal in appearance [Oropharynx] : the oropharynx was normal [Neck Appearance] : the appearance of the neck was normal [Neck Cervical Mass (___cm)] : no neck mass was observed [Jugular Venous Distention Increased] : there was no jugular-venous distention [Thyroid Diffuse Enlargement] : the thyroid was not enlarged [Thyroid Nodule] : there were no palpable thyroid nodules [Auscultation Breath Sounds / Voice Sounds] : lungs were clear to auscultation bilaterally [Heart Rate And Rhythm] : heart rate was normal and rhythm regular [Heart Sounds] : normal S1 and S2 [Heart Sounds Gallop] : no gallops [Murmurs] : no murmurs [Heart Sounds Pericardial Friction Rub] : no pericardial rub [Arterial Pulses Carotid] : carotid pulses were normal with no bruits [Full Pulse] : the pedal pulses are present [Edema] : there was no peripheral edema [Bowel Sounds] : normal bowel sounds [Abdomen Soft] : soft [Abdomen Tenderness] : non-tender [Abdomen Mass (___ Cm)] : no abdominal mass palpated [No CVA Tenderness] : no ~M costovertebral angle tenderness [No Spinal Tenderness] : no spinal tenderness [Abnormal Walk] : normal gait [Nail Clubbing] : no clubbing  or cyanosis of the fingernails [Musculoskeletal - Swelling] : no joint swelling seen [Motor Tone] : muscle strength and tone were normal [Skin Color & Pigmentation] : normal skin color and pigmentation [Skin Turgor] : normal skin turgor [] : no rash [FreeTextEntry1] : No SI. [Motor Strength Upper Extremities Bilaterally] : strength was normal in both upper extremities [Motor Strength Lower Extremities Bilaterally] : strength was normal in both lower extremities [Romberg's Sign] : Romberg's sign was negtive [Allodynia] : no ~T allodynia present [Dysesthesia] : no dysesthesia [Hyperesthesia] : no hyperesthesia [Limited Balance] : balance was intact [Past-pointing] : there was no past-pointing [Tremor] : no tremor present [Dysdiadochokinesia Bilaterally] : not present [Coordination - Dysmetria Impaired Finger-to-Nose Bilateral] : not present [Coordination - Dysmetria Impaired Heel-to-Shin Bilateral] : not present [Plantar Reflex Right Only] : normal on the right [Plantar Reflex Left Only] : normal on the left [FreeTextEntry4] : Presidents: 2/5 needs hints\par Alternating Pattern: ok\par Clock: 3/3\par Luria: ok\par Spiral: ok, some tremor-feels agitated\par Knows R/L on self and examiner.\par X-line commands: ok\par Praxia: ok.\par  [FreeTextEntry7] : mild reduction in vb and pos sense in LE.

## 2019-03-26 NOTE — ED ADULT TRIAGE NOTE - CCCP TRG CHIEF CMPLNT
weakness
Smoking even a single puff increases the likelihood of a full relapse, withdrawal symptoms peak within 1-2 weeks, but can persist for months

## 2019-03-29 RX ORDER — COLCHICINE 0.6 MG/1
0.6 TABLET ORAL DAILY
Qty: 90 | Refills: 0 | Status: ACTIVE | COMMUNITY
Start: 2018-12-28 | End: 1900-01-01

## 2019-04-16 ENCOUNTER — RX RENEWAL (OUTPATIENT)
Age: 74
End: 2019-04-16

## 2019-04-17 ENCOUNTER — RX RENEWAL (OUTPATIENT)
Age: 74
End: 2019-04-17

## 2019-04-18 PROCEDURE — 99214 OFFICE O/P EST MOD 30 MIN: CPT

## 2019-07-12 ENCOUNTER — APPOINTMENT (OUTPATIENT)
Dept: GERIATRICS | Facility: CLINIC | Age: 74
End: 2019-07-12
Payer: MEDICARE

## 2019-07-12 DIAGNOSIS — Z23 ENCOUNTER FOR IMMUNIZATION: ICD-10-CM

## 2019-07-12 PROCEDURE — 90715 TDAP VACCINE 7 YRS/> IM: CPT | Mod: GY

## 2019-07-12 PROCEDURE — 90471 IMMUNIZATION ADMIN: CPT | Mod: GY

## 2019-07-12 PROCEDURE — 99213 OFFICE O/P EST LOW 20 MIN: CPT

## 2019-07-19 ENCOUNTER — RX RENEWAL (OUTPATIENT)
Age: 74
End: 2019-07-19

## 2019-08-19 ENCOUNTER — APPOINTMENT (OUTPATIENT)
Dept: NEUROLOGY | Facility: CLINIC | Age: 74
End: 2019-08-19

## 2019-09-01 NOTE — PATIENT PROFILE ADULT - IS THERE A SUSPICION OF ABUSE/NEGLIGENCE?
Pt. c/o abdominal pain, back pain, rectal pain, and difficulty swallowing. Pt. has a small bump on her back that is painful. no

## 2019-09-03 ENCOUNTER — RX RENEWAL (OUTPATIENT)
Age: 74
End: 2019-09-03

## 2019-10-18 PROCEDURE — 99213 OFFICE O/P EST LOW 20 MIN: CPT

## 2019-10-30 ENCOUNTER — RX RENEWAL (OUTPATIENT)
Age: 74
End: 2019-10-30

## 2020-01-10 PROCEDURE — 99213 OFFICE O/P EST LOW 20 MIN: CPT

## 2020-01-13 ENCOUNTER — APPOINTMENT (OUTPATIENT)
Dept: NEUROLOGY | Facility: CLINIC | Age: 75
End: 2020-01-13
Payer: MEDICARE

## 2020-01-13 VITALS
HEIGHT: 60 IN | DIASTOLIC BLOOD PRESSURE: 84 MMHG | HEART RATE: 72 BPM | SYSTOLIC BLOOD PRESSURE: 143 MMHG | BODY MASS INDEX: 38.28 KG/M2 | WEIGHT: 195 LBS

## 2020-01-13 DIAGNOSIS — G31.84 MILD COGNITIVE IMPAIRMENT, SO STATED: ICD-10-CM

## 2020-01-13 PROCEDURE — 99214 OFFICE O/P EST MOD 30 MIN: CPT

## 2020-01-13 RX ORDER — METFORMIN ER 500 MG 500 MG/1
500 TABLET ORAL
Qty: 90 | Refills: 2 | Status: DISCONTINUED | COMMUNITY
Start: 2017-10-30 | End: 2020-01-13

## 2020-01-13 RX ORDER — QUETIAPINE FUMARATE 50 MG/1
50 TABLET ORAL DAILY
Qty: 45 | Refills: 2 | Status: DISCONTINUED | COMMUNITY
Start: 2017-08-07 | End: 2020-01-13

## 2020-01-13 RX ORDER — COLCHICINE 0.6 MG/1
0.6 TABLET, FILM COATED ORAL
Qty: 90 | Refills: 0 | Status: DISCONTINUED | COMMUNITY
Start: 2019-07-19 | End: 2020-01-13

## 2020-01-13 RX ORDER — ESCITALOPRAM OXALATE 20 MG/1
20 TABLET ORAL DAILY
Refills: 0 | Status: ACTIVE | COMMUNITY
Start: 2020-01-13

## 2020-01-13 RX ORDER — MIRTAZAPINE 15 MG/1
15 TABLET, FILM COATED ORAL AT BEDTIME
Qty: 135 | Refills: 2 | Status: DISCONTINUED | COMMUNITY
Start: 2017-10-11 | End: 2020-01-13

## 2020-01-13 NOTE — PHYSICAL EXAM
[General Appearance - Alert] : alert [Impaired Insight] : insight and judgment were intact [Oriented To Time, Place, And Person] : oriented to person, place, and time [General Appearance - In No Acute Distress] : in no acute distress [Over the Past 2 Weeks, Have You Felt Little Interest or Pleasure Doing Things?] : 2.) Over the past 2 weeks, have you felt little interest or pleasure doing things? Yes [Affect] : the affect was normal [Over the Past 2 Weeks, Have You Felt Down, Depressed, or Hopeless?] : 1.) Over the past 2 weeks, have you felt down, depressed, or hopeless? Yes [Person] : oriented to person [Concentration Intact] : normal concentrating ability [Visual Intact] : visual attention was ~T not ~L decreased [Naming Objects] : no difficulty naming common objects [Repeating Phrases] : no difficulty repeating a phrase [Writing A Sentence] : no difficulty writing a sentence [Fluency] : fluency intact [Reading] : reading intact [Past History] : adequate knowledge of personal past history [Comprehension] : comprehension intact [Repeating a Sentence ___ / 1] : repeating a sentence [unfilled] / 1 [Naming 2 Objects ___ / 2] : naming two objects [unfilled] / 2 [3-stage Verbal Command ___ / 3] : three-stage verbal command [unfilled] / 3 [Writing a Sentence ___ / 1] : write sentence [unfilled] / 1 [Written Command ___ / 1] : written command [unfilled] / 1 [Cranial Nerves Trigeminal (V)] : facial sensation intact symmetrically [Cranial Nerves Optic (II)] : visual acuity intact bilaterally,  visual fields full to confrontation, pupils equal round and reactive to light [Cranial Nerves Oculomotor (III)] : extraocular motion intact [Cranial Nerves Vestibulocochlear (VIII)] : hearing was intact bilaterally [Cranial Nerves Glossopharyngeal (IX)] : tongue and palate midline [Cranial Nerves Facial (VII)] : face symmetrical [Cranial Nerves Accessory (XI - Cranial And Spinal)] : head turning and shoulder shrug symmetric [Cranial Nerves Hypoglossal (XII)] : there was no tongue deviation with protrusion [Motor Strength] : muscle strength was normal in all four extremities [Motor Handedness Right-Handed] : the patient is right hand dominant [Involuntary Movements] : no involuntary movements were seen [No Muscle Atrophy] : normal bulk in all four extremities [Sensation Tactile Decrease] : light touch was intact [Sensation Pain / Temperature Decrease] : pain and temperature was intact [Balance] : balance was intact [2+] : Brachioradialis left 2+ [1+] : Ankle jerk left 1+ [PERRL With Normal Accommodation] : pupils were equal in size, round, reactive to light, with normal accommodation [Sclera] : the sclera and conjunctiva were normal [No MIKI] : no internuclear ophthalmoplegia [No APD] : no afferent pupillary defect [Optic Disc Abnormality] : the optic disc were normal in size and color [Extraocular Movements] : extraocular movements were intact [Outer Ear] : the ears and nose were normal in appearance [Full Visual Field] : full visual field [Neck Appearance] : the appearance of the neck was normal [Oropharynx] : the oropharynx was normal [Jugular Venous Distention Increased] : there was no jugular-venous distention [Neck Cervical Mass (___cm)] : no neck mass was observed [Thyroid Diffuse Enlargement] : the thyroid was not enlarged [Auscultation Breath Sounds / Voice Sounds] : lungs were clear to auscultation bilaterally [Thyroid Nodule] : there were no palpable thyroid nodules [Heart Sounds] : normal S1 and S2 [Heart Sounds Gallop] : no gallops [Heart Rate And Rhythm] : heart rate was normal and rhythm regular [Arterial Pulses Carotid] : carotid pulses were normal with no bruits [Murmurs] : no murmurs [Heart Sounds Pericardial Friction Rub] : no pericardial rub [Edema] : there was no peripheral edema [Bowel Sounds] : normal bowel sounds [Full Pulse] : the pedal pulses are present [Abdomen Soft] : soft [Abdomen Tenderness] : non-tender [Abdomen Mass (___ Cm)] : no abdominal mass palpated [No CVA Tenderness] : no ~M costovertebral angle tenderness [No Spinal Tenderness] : no spinal tenderness [Nail Clubbing] : no clubbing  or cyanosis of the fingernails [Abnormal Walk] : normal gait [Motor Tone] : muscle strength and tone were normal [Musculoskeletal - Swelling] : no joint swelling seen [Skin Color & Pigmentation] : normal skin color and pigmentation [Skin Turgor] : normal skin turgor [] : no rash [Registration Intact] : recent registration memory intact [Remote Intact] : remote memory intact [Span Intact] : the attention span was normal [Vocabulary] : adequate range of vocabulary [Total Score ___ / 30] : the patient achieved a score of [unfilled] /30 [Place ___ / 5] : place [unfilled] / 5 [Date / Time ___ / 5] : date / time [unfilled] / 5 [Registration ___ / 3] : registration [unfilled] / 3 [Serial Sevens ___/5] : serial sevens [unfilled] / 5 [Copy a Design ___ / 1] : copy a design [unfilled] / 1 [Recall ___ / 3] : recall [unfilled] / 3 [FreeTextEntry1] : No SI. [Time] : disoriented to time [Place] : disoriented to place [Short Term Intact] : short term memory impaired [Motor Strength Upper Extremities Bilaterally] : strength was normal in both upper extremities [Current Events] : inadequate knowledge of current events [Romberg's Sign] : Romberg's sign was negtive [Allodynia] : no ~T allodynia present [Motor Strength Lower Extremities Bilaterally] : strength was normal in both lower extremities [Hyperesthesia] : no hyperesthesia [Dysesthesia] : no dysesthesia [Limited Balance] : balance was intact [Tremor] : no tremor present [Past-pointing] : there was no past-pointing [Coordination - Dysmetria Impaired Finger-to-Nose Bilateral] : not present [Dysdiadochokinesia Bilaterally] : not present [Coordination - Dysmetria Impaired Heel-to-Shin Bilateral] : not present [Plantar Reflex Left Only] : normal on the left [Plantar Reflex Right Only] : normal on the right [FreeTextEntry4] : Presidents: 1/5 needs hints\par Alternating Pattern: ok\par Clock: 2/3\par Luria: ok\par Spiral: ok, some tremor-feels agitated\par Knows R/L on self and examiner.\par X-line commands: ok\par Praxia: ok. [FreeTextEntry7] : mild reduction in vb and pos sense in LE.

## 2020-01-13 NOTE — HISTORY OF PRESENT ILLNESS
[FreeTextEntry1] : HPI-Interval Hx [id number]:\par pt here after 11 months.\par A few changes in her meds, off Seroquel and Mirtazapine (gained weight) and started on Risperidone and Lexapro.\par Sleep: Tends to stay up late and sleeps in the day. \par [facility] +.\par [facility] center 2[facility]week.\par ADL have been declining, with more need for supervision and prompting.\par IADL poor.\par Appetite ok.\par per son, she is more disoriented and her STM is much poorer. She goes to the day center and has a good time. \par \par HPI-Interval Hx [id number]:\par She was just in hospital and rehab due to gout pain, now better. \par Per son, she is getting a bit worse, with STM.\par Senior center 2/week, bus picks her up.\par She is well monitored by family. They keep an eye on food and meds. \par She still lives alone.\par Psychiatrist planning to restart her on SSRI, not clear which one.\par \par HPI-Interval Hx [id number]:\par MRI and nPT indicative of MCI/AD.\par Stopped driving.\par Still not decided about clinical trials, here with her son, and would like to participate.\par Per son, she has worsened a lot in these few months. Mostly the short term memory.\par \par PMH:\par 71yo RH WW, with HTN, DM, hypothyroid and depression, here with her son for possible dementia.\par Pt has suffered from depression for a long time, and tried several different medications. She was following with Psych but has recently lost follow-up.\par For a few years, 1-2 max, family and pt has noted STM issues, gradually progressive. \par She has recently retired from her secretarial job, and is going back to work soon.\par Memory: mostly repeating things over and over. Not really affecting her life and job.\par Speech: ok.\par There appear not to be any frontal issues. \par Sleep: ok, now better on Mirtazapine. No snoring.\par Appetite: little appetite, forgets to eat. no recent weight loss.\par Few accidental falls in last years, has forgotten, possibly due to knee pain.\par Mood: c/o anxiety and depression, has used several different meds in the past, recently was on Paroxetine, stopped with no reason; has abused Xanax in the past, not taking now. Unclear why she is on Seroquel.\par ADl and IADL intact. Drives, no issues.

## 2020-01-13 NOTE — ASSESSMENT
[FreeTextEntry1] : Assessment:\par 73yo RH WW, with MCI, mostly amnestic. Depression is a component.\par NPT and MRI c/w AD.\par Progression is evident, with poor STM and orientation, decreased IADL and ADL.\par \par Impression:\par Mild AD.\par Depression.\par \par Plan:\par -add Memantine 7mg ER\par -Encourage activity. \par \par A thorough discussion was entertained with the patient/caregiver regarding the use of psychoactive medications, their possible benefits and AE profile, including the risk of cardiovascular complications.\par We discussed the benefits of being active, physically and mentally, and the need to to establish a routine in this respect.\par Driving abilities and firearms possession and use were discussed, in relation to progression of the cognitive decline, and the need to assess them periodically.\par Patient/caregiver understand and agree with the plan.

## 2020-02-15 ENCOUNTER — INPATIENT (INPATIENT)
Facility: HOSPITAL | Age: 75
LOS: 5 days | Discharge: NURSING, REHAB OR LONG-TERM CARE | End: 2020-02-21
Attending: INTERNAL MEDICINE | Admitting: INTERNAL MEDICINE
Payer: MEDICARE

## 2020-02-15 VITALS
HEART RATE: 78 BPM | TEMPERATURE: 98 F | SYSTOLIC BLOOD PRESSURE: 130 MMHG | RESPIRATION RATE: 18 BRPM | OXYGEN SATURATION: 96 % | DIASTOLIC BLOOD PRESSURE: 62 MMHG

## 2020-02-15 DIAGNOSIS — R62.7 ADULT FAILURE TO THRIVE: ICD-10-CM

## 2020-02-15 DIAGNOSIS — M10.9 GOUT, UNSPECIFIED: ICD-10-CM

## 2020-02-15 DIAGNOSIS — E03.9 HYPOTHYROIDISM, UNSPECIFIED: ICD-10-CM

## 2020-02-15 DIAGNOSIS — E87.1 HYPO-OSMOLALITY AND HYPONATREMIA: ICD-10-CM

## 2020-02-15 DIAGNOSIS — E11.9 TYPE 2 DIABETES MELLITUS WITHOUT COMPLICATIONS: ICD-10-CM

## 2020-02-15 DIAGNOSIS — F32.9 MAJOR DEPRESSIVE DISORDER, SINGLE EPISODE, UNSPECIFIED: ICD-10-CM

## 2020-02-15 DIAGNOSIS — Z29.9 ENCOUNTER FOR PROPHYLACTIC MEASURES, UNSPECIFIED: ICD-10-CM

## 2020-02-15 DIAGNOSIS — Z02.9 ENCOUNTER FOR ADMINISTRATIVE EXAMINATIONS, UNSPECIFIED: ICD-10-CM

## 2020-02-15 DIAGNOSIS — G30.9 ALZHEIMER'S DISEASE, UNSPECIFIED: ICD-10-CM

## 2020-02-15 DIAGNOSIS — Z90.49 ACQUIRED ABSENCE OF OTHER SPECIFIED PARTS OF DIGESTIVE TRACT: Chronic | ICD-10-CM

## 2020-02-15 LAB
ALBUMIN SERPL ELPH-MCNC: 3.7 G/DL — SIGNIFICANT CHANGE UP (ref 3.3–5)
ALP SERPL-CCNC: 71 U/L — SIGNIFICANT CHANGE UP (ref 40–120)
ALT FLD-CCNC: 15 U/L — SIGNIFICANT CHANGE UP (ref 4–33)
ANION GAP SERPL CALC-SCNC: 12 MMO/L — SIGNIFICANT CHANGE UP (ref 7–14)
ANISOCYTOSIS BLD QL: SLIGHT — SIGNIFICANT CHANGE UP
APPEARANCE UR: SIGNIFICANT CHANGE UP
AST SERPL-CCNC: 13 U/L — SIGNIFICANT CHANGE UP (ref 4–32)
BACTERIA # UR AUTO: NEGATIVE — SIGNIFICANT CHANGE UP
BASE EXCESS BLDV CALC-SCNC: 3.7 MMOL/L — SIGNIFICANT CHANGE UP
BASOPHILS # BLD AUTO: 0.06 K/UL — SIGNIFICANT CHANGE UP (ref 0–0.2)
BASOPHILS NFR BLD AUTO: 0.4 % — SIGNIFICANT CHANGE UP (ref 0–2)
BASOPHILS NFR SPEC: 0 % — SIGNIFICANT CHANGE UP (ref 0–2)
BILIRUB SERPL-MCNC: 0.4 MG/DL — SIGNIFICANT CHANGE UP (ref 0.2–1.2)
BILIRUB UR-MCNC: NEGATIVE — SIGNIFICANT CHANGE UP
BLASTS # FLD: 0 % — SIGNIFICANT CHANGE UP (ref 0–0)
BLOOD GAS VENOUS - CREATININE: 1.12 MG/DL — SIGNIFICANT CHANGE UP (ref 0.5–1.3)
BLOOD GAS VENOUS - FIO2: 21 — SIGNIFICANT CHANGE UP
BLOOD UR QL VISUAL: NEGATIVE — SIGNIFICANT CHANGE UP
BUN SERPL-MCNC: 19 MG/DL — SIGNIFICANT CHANGE UP (ref 7–23)
CALCIUM SERPL-MCNC: 9.6 MG/DL — SIGNIFICANT CHANGE UP (ref 8.4–10.5)
CHLORIDE BLDV-SCNC: 94 MMOL/L — LOW (ref 96–108)
CHLORIDE SERPL-SCNC: 91 MMOL/L — LOW (ref 98–107)
CO2 SERPL-SCNC: 26 MMOL/L — SIGNIFICANT CHANGE UP (ref 22–31)
COLOR SPEC: YELLOW — SIGNIFICANT CHANGE UP
CREAT SERPL-MCNC: 1.15 MG/DL — SIGNIFICANT CHANGE UP (ref 0.5–1.3)
EOSINOPHIL # BLD AUTO: 0.04 K/UL — SIGNIFICANT CHANGE UP (ref 0–0.5)
EOSINOPHIL NFR BLD AUTO: 0.3 % — SIGNIFICANT CHANGE UP (ref 0–6)
EOSINOPHIL NFR FLD: 0.9 % — SIGNIFICANT CHANGE UP (ref 0–6)
GAS PNL BLDV: 133 MMOL/L — LOW (ref 136–146)
GIANT PLATELETS BLD QL SMEAR: PRESENT — SIGNIFICANT CHANGE UP
GLUCOSE BLDC GLUCOMTR-MCNC: 306 MG/DL — HIGH (ref 70–99)
GLUCOSE BLDV-MCNC: 302 MG/DL — HIGH (ref 70–99)
GLUCOSE SERPL-MCNC: 337 MG/DL — HIGH (ref 70–99)
GLUCOSE UR-MCNC: 1000 — HIGH
HCO3 BLDV-SCNC: 26 MMOL/L — SIGNIFICANT CHANGE UP (ref 20–27)
HCT VFR BLD CALC: 36.9 % — SIGNIFICANT CHANGE UP (ref 34.5–45)
HCT VFR BLDV CALC: 38.1 % — SIGNIFICANT CHANGE UP (ref 34.5–45)
HGB BLD-MCNC: 12.2 G/DL — SIGNIFICANT CHANGE UP (ref 11.5–15.5)
HGB BLDV-MCNC: 12.4 G/DL — SIGNIFICANT CHANGE UP (ref 11.5–15.5)
IMM GRANULOCYTES NFR BLD AUTO: 0.5 % — SIGNIFICANT CHANGE UP (ref 0–1.5)
KETONES UR-MCNC: NEGATIVE — SIGNIFICANT CHANGE UP
LACTATE BLDV-MCNC: 1.8 MMOL/L — SIGNIFICANT CHANGE UP (ref 0.5–2)
LEUKOCYTE ESTERASE UR-ACNC: SIGNIFICANT CHANGE UP
LYMPHOCYTES # BLD AUTO: 1.79 K/UL — SIGNIFICANT CHANGE UP (ref 1–3.3)
LYMPHOCYTES # BLD AUTO: 11.4 % — LOW (ref 13–44)
LYMPHOCYTES NFR SPEC AUTO: 12.2 % — LOW (ref 13–44)
MACROCYTES BLD QL: SLIGHT — SIGNIFICANT CHANGE UP
MAGNESIUM SERPL-MCNC: 1.7 MG/DL — SIGNIFICANT CHANGE UP (ref 1.6–2.6)
MCHC RBC-ENTMCNC: 30.6 PG — SIGNIFICANT CHANGE UP (ref 27–34)
MCHC RBC-ENTMCNC: 33.1 % — SIGNIFICANT CHANGE UP (ref 32–36)
MCV RBC AUTO: 92.5 FL — SIGNIFICANT CHANGE UP (ref 80–100)
METAMYELOCYTES # FLD: 0 % — SIGNIFICANT CHANGE UP (ref 0–1)
MONOCYTES # BLD AUTO: 1.58 K/UL — HIGH (ref 0–0.9)
MONOCYTES NFR BLD AUTO: 10 % — SIGNIFICANT CHANGE UP (ref 2–14)
MONOCYTES NFR BLD: 10.4 % — HIGH (ref 2–9)
MYELOCYTES NFR BLD: 0 % — SIGNIFICANT CHANGE UP (ref 0–0)
NEUTROPHIL AB SER-ACNC: 71.3 % — SIGNIFICANT CHANGE UP (ref 43–77)
NEUTROPHILS # BLD AUTO: 12.2 K/UL — HIGH (ref 1.8–7.4)
NEUTROPHILS NFR BLD AUTO: 77.4 % — HIGH (ref 43–77)
NEUTS BAND # BLD: 1.7 % — SIGNIFICANT CHANGE UP (ref 0–6)
NITRITE UR-MCNC: NEGATIVE — SIGNIFICANT CHANGE UP
NRBC # FLD: 0 K/UL — SIGNIFICANT CHANGE UP (ref 0–0)
OTHER - HEMATOLOGY %: 0 — SIGNIFICANT CHANGE UP
PCO2 BLDV: 51 MMHG — SIGNIFICANT CHANGE UP (ref 41–51)
PH BLDV: 7.37 PH — SIGNIFICANT CHANGE UP (ref 7.32–7.43)
PH UR: 5.5 — SIGNIFICANT CHANGE UP (ref 5–8)
PHOSPHATE SERPL-MCNC: 2.6 MG/DL — SIGNIFICANT CHANGE UP (ref 2.5–4.5)
PLATELET # BLD AUTO: 251 K/UL — SIGNIFICANT CHANGE UP (ref 150–400)
PLATELET COUNT - ESTIMATE: NORMAL — SIGNIFICANT CHANGE UP
PMV BLD: 10.2 FL — SIGNIFICANT CHANGE UP (ref 7–13)
PO2 BLDV: 28 MMHG — LOW (ref 35–40)
POTASSIUM BLDV-SCNC: 4.3 MMOL/L — SIGNIFICANT CHANGE UP (ref 3.4–4.5)
POTASSIUM SERPL-MCNC: 4.4 MMOL/L — SIGNIFICANT CHANGE UP (ref 3.5–5.3)
POTASSIUM SERPL-SCNC: 4.4 MMOL/L — SIGNIFICANT CHANGE UP (ref 3.5–5.3)
PROMYELOCYTES # FLD: 0 % — SIGNIFICANT CHANGE UP (ref 0–0)
PROT SERPL-MCNC: 7.5 G/DL — SIGNIFICANT CHANGE UP (ref 6–8.3)
PROT UR-MCNC: 50 — SIGNIFICANT CHANGE UP
RBC # BLD: 3.99 M/UL — SIGNIFICANT CHANGE UP (ref 3.8–5.2)
RBC # FLD: 12.6 % — SIGNIFICANT CHANGE UP (ref 10.3–14.5)
RBC CASTS # UR COMP ASSIST: SIGNIFICANT CHANGE UP (ref 0–?)
REVIEW TO FOLLOW: YES — SIGNIFICANT CHANGE UP
SAO2 % BLDV: 47.5 % — LOW (ref 60–85)
SODIUM SERPL-SCNC: 129 MMOL/L — LOW (ref 135–145)
SP GR SPEC: 1.02 — SIGNIFICANT CHANGE UP (ref 1–1.04)
SQUAMOUS # UR AUTO: SIGNIFICANT CHANGE UP
TSH SERPL-MCNC: 3.61 UIU/ML — SIGNIFICANT CHANGE UP (ref 0.27–4.2)
UROBILINOGEN FLD QL: NORMAL — SIGNIFICANT CHANGE UP
VARIANT LYMPHS # BLD: 3.5 % — SIGNIFICANT CHANGE UP
WBC # BLD: 15.75 K/UL — HIGH (ref 3.8–10.5)
WBC # FLD AUTO: 15.75 K/UL — HIGH (ref 3.8–10.5)
WBC UR QL: HIGH (ref 0–?)

## 2020-02-15 PROCEDURE — 71045 X-RAY EXAM CHEST 1 VIEW: CPT | Mod: 26

## 2020-02-15 RX ORDER — ESCITALOPRAM OXALATE 10 MG/1
1 TABLET, FILM COATED ORAL
Qty: 0 | Refills: 0 | DISCHARGE

## 2020-02-15 RX ORDER — DEXTROSE 50 % IN WATER 50 %
25 SYRINGE (ML) INTRAVENOUS ONCE
Refills: 0 | Status: DISCONTINUED | OUTPATIENT
Start: 2020-02-15 | End: 2020-02-16

## 2020-02-15 RX ORDER — SODIUM CHLORIDE 9 MG/ML
1000 INJECTION INTRAMUSCULAR; INTRAVENOUS; SUBCUTANEOUS
Refills: 0 | Status: DISCONTINUED | OUTPATIENT
Start: 2020-02-15 | End: 2020-02-18

## 2020-02-15 RX ORDER — QUETIAPINE FUMARATE 200 MG/1
1 TABLET, FILM COATED ORAL
Qty: 0 | Refills: 0 | DISCHARGE

## 2020-02-15 RX ORDER — MIRTAZAPINE 45 MG/1
1 TABLET, ORALLY DISINTEGRATING ORAL
Qty: 0 | Refills: 0 | DISCHARGE

## 2020-02-15 RX ORDER — LISINOPRIL 2.5 MG/1
1 TABLET ORAL
Qty: 0 | Refills: 0 | DISCHARGE

## 2020-02-15 RX ORDER — INSULIN LISPRO 100/ML
VIAL (ML) SUBCUTANEOUS
Refills: 0 | Status: DISCONTINUED | OUTPATIENT
Start: 2020-02-15 | End: 2020-02-16

## 2020-02-15 RX ORDER — ESCITALOPRAM OXALATE 10 MG/1
20 TABLET, FILM COATED ORAL DAILY
Refills: 0 | Status: DISCONTINUED | OUTPATIENT
Start: 2020-02-15 | End: 2020-02-21

## 2020-02-15 RX ORDER — LEVOTHYROXINE SODIUM 125 MCG
75 TABLET ORAL DAILY
Refills: 0 | Status: DISCONTINUED | OUTPATIENT
Start: 2020-02-15 | End: 2020-02-21

## 2020-02-15 RX ORDER — SODIUM CHLORIDE 9 MG/ML
1000 INJECTION INTRAMUSCULAR; INTRAVENOUS; SUBCUTANEOUS ONCE
Refills: 0 | Status: COMPLETED | OUTPATIENT
Start: 2020-02-15 | End: 2020-02-15

## 2020-02-15 RX ORDER — INSULIN LISPRO 100/ML
VIAL (ML) SUBCUTANEOUS AT BEDTIME
Refills: 0 | Status: DISCONTINUED | OUTPATIENT
Start: 2020-02-15 | End: 2020-02-16

## 2020-02-15 RX ORDER — RISPERIDONE 4 MG/1
1 TABLET ORAL
Qty: 0 | Refills: 0 | DISCHARGE

## 2020-02-15 RX ORDER — DEXTROSE 50 % IN WATER 50 %
15 SYRINGE (ML) INTRAVENOUS ONCE
Refills: 0 | Status: DISCONTINUED | OUTPATIENT
Start: 2020-02-15 | End: 2020-02-16

## 2020-02-15 RX ORDER — DEXTROSE 50 % IN WATER 50 %
12.5 SYRINGE (ML) INTRAVENOUS ONCE
Refills: 0 | Status: DISCONTINUED | OUTPATIENT
Start: 2020-02-15 | End: 2020-02-16

## 2020-02-15 RX ORDER — MEMANTINE HYDROCHLORIDE 10 MG/1
7 TABLET ORAL DAILY
Refills: 0 | Status: DISCONTINUED | OUTPATIENT
Start: 2020-02-15 | End: 2020-02-16

## 2020-02-15 RX ORDER — ENOXAPARIN SODIUM 100 MG/ML
40 INJECTION SUBCUTANEOUS DAILY
Refills: 0 | Status: DISCONTINUED | OUTPATIENT
Start: 2020-02-15 | End: 2020-02-21

## 2020-02-15 RX ORDER — COLCHICINE 0.6 MG
1 TABLET ORAL
Qty: 0 | Refills: 0 | DISCHARGE

## 2020-02-15 RX ORDER — SIMVASTATIN 20 MG/1
1 TABLET, FILM COATED ORAL
Qty: 0 | Refills: 0 | DISCHARGE

## 2020-02-15 RX ORDER — COLCHICINE 0.6 MG
0.6 TABLET ORAL DAILY
Refills: 0 | Status: DISCONTINUED | OUTPATIENT
Start: 2020-02-15 | End: 2020-02-21

## 2020-02-15 RX ORDER — DONEPEZIL HYDROCHLORIDE 10 MG/1
10 TABLET, FILM COATED ORAL AT BEDTIME
Refills: 0 | Status: DISCONTINUED | OUTPATIENT
Start: 2020-02-15 | End: 2020-02-16

## 2020-02-15 RX ORDER — DONEPEZIL HYDROCHLORIDE 10 MG/1
1 TABLET, FILM COATED ORAL
Qty: 0 | Refills: 0 | DISCHARGE

## 2020-02-15 RX ORDER — SODIUM CHLORIDE 9 MG/ML
1000 INJECTION, SOLUTION INTRAVENOUS
Refills: 0 | Status: DISCONTINUED | OUTPATIENT
Start: 2020-02-15 | End: 2020-02-16

## 2020-02-15 RX ORDER — GLUCAGON INJECTION, SOLUTION 0.5 MG/.1ML
1 INJECTION, SOLUTION SUBCUTANEOUS ONCE
Refills: 0 | Status: DISCONTINUED | OUTPATIENT
Start: 2020-02-15 | End: 2020-02-16

## 2020-02-15 RX ORDER — MEMANTINE HYDROCHLORIDE 10 MG/1
1 TABLET ORAL
Qty: 0 | Refills: 0 | DISCHARGE

## 2020-02-15 RX ORDER — LEVOTHYROXINE SODIUM 125 MCG
1 TABLET ORAL
Qty: 0 | Refills: 0 | DISCHARGE

## 2020-02-15 RX ORDER — RISPERIDONE 4 MG/1
0.5 TABLET ORAL AT BEDTIME
Refills: 0 | Status: DISCONTINUED | OUTPATIENT
Start: 2020-02-15 | End: 2020-02-21

## 2020-02-15 RX ADMIN — SODIUM CHLORIDE 1000 MILLILITER(S): 9 INJECTION INTRAMUSCULAR; INTRAVENOUS; SUBCUTANEOUS at 18:55

## 2020-02-15 RX ADMIN — RISPERIDONE 0.5 MILLIGRAM(S): 4 TABLET ORAL at 23:34

## 2020-02-15 RX ADMIN — SODIUM CHLORIDE 100 MILLILITER(S): 9 INJECTION INTRAMUSCULAR; INTRAVENOUS; SUBCUTANEOUS at 21:27

## 2020-02-15 RX ADMIN — Medication 2: at 23:34

## 2020-02-15 NOTE — H&P ADULT - PROBLEM SELECTOR PLAN 4
- on oral metformin only   - A1c in AM   - low correction ISS - mild hyponatremia, Na+ 129, could be in the setting of dehydration   - s/p 1L of NS in ED, continue with maintenance fluid @100ml/hr   - repeat BMP in AM - mild hyponatremia, Na+ 129, corrects to 134 accounting for glucose  - s/p 1L of NS in ED, continue with maintenance fluid @100ml/hr   - repeat BMP in AM

## 2020-02-15 NOTE — H&P ADULT - PROBLEM SELECTOR PLAN 5
- continue home Escitalopram 20mg, Risperidone 0.5mg - on oral metformin only   - A1c in AM   - low correction ISS NIDDM2  - on oral metformin only   - A1c in AM   - low correction ISS NIDDM2 w/hyperglycemia   - on oral metformin only   - A1c in AM   - low correction ISS

## 2020-02-15 NOTE — H&P ADULT - PROBLEM SELECTOR PLAN 2
- continue home donepezil and memantine - continue home donepezil and memantine  - follows Dr. Michelle: per last note in Jan 2020, mini mental status score 17-19,  has increased difficulty with ADLS and IADS, appears that pt needs constant prompting and supervision

## 2020-02-15 NOTE — PATIENT PROFILE ADULT - CONTRAINDICATIONS & PRECAUTIONS (SELECT ALL THAT APPLY)
Pt's pressure changed in clinic to APAP 10-15cm.  Pt was shown 2 FFMs and fit was checked with his current Air Fit F20 Medium mask.  Pt is wearing his mask appropriately and it is appropriately sized.  Pt really liked the Air Fit F30 Medium minimal contact FFM.  Pt was also sized with the [employer] (Med/Medium FFC).  Pt is borrowing F30 Medium for short trial and requests that one be ordered for him from [facility].  Pt clinic order updated. fabby   Severe allergy/sensitivity to eggs/thimerosal/other vaccine components or previous serious reaction to vaccine

## 2020-02-15 NOTE — ED PROVIDER NOTE - OBJECTIVE STATEMENT
PCP: Dr. Nhung Kramer 73yo F h/o dementia, HTN, HLD, hypothyroidism here with increasing inability to take care of self    Pt lives alone w/ [facility] 3 days/week, yesterday son noticed pt was having increased difficulty getting up stairs in home and to bed. Today required assistance to bath and get around. Still ambulating without assistance however son is concerned that pt can no longer take care of self.  No CP/SOB, no abd pain, at baseline mental status as per son.     PCP: Dr. Nhung Kramer

## 2020-02-15 NOTE — ED ADULT NURSE NOTE - NSIMPLEMENTINTERV_GEN_ALL_ED
Implemented All Fall with Harm Risk Interventions:  [place] to call system. Call bell, personal items and telephone within reach. Instruct patient to call for assistance. Room bathroom lighting operational. Non-slip footwear when patient is off stretcher. Physically safe environment: no spills, clutter or unnecessary equipment. Stretcher in lowest position, wheels locked, appropriate side rails in place. Provide visual cue, wrist band, yellow gown, etc. Monitor gait and stability. Monitor for mental status changes and reorient to person, place, and time. Review medications for side effects contributing to fall risk. Reinforce activity limits and safety measures with patient and family. Provide visual clues: red socks.

## 2020-02-15 NOTE — ED ADULT TRIAGE NOTE - CHIEF COMPLAINT QUOTE
Patient with a hx of Alzheimer's dementia BIB son due to acute onset worsening confusion.  Patient reporting generalized pain, had an episode of urinary incontinence yesterday which is atypical as per son.

## 2020-02-15 NOTE — H&P ADULT - NSHPPHYSICALEXAM_GEN_ALL_CORE
PHYSICAL EXAM:    Vital Signs Last 24 Hrs  T(C): 36.4 (15 Feb 2020 19:06), Max: 36.5 (15 Feb 2020 15:16)  T(F): 97.6 (15 Feb 2020 19:06), Max: 97.7 (15 Feb 2020 15:16)  HR: 75 (15 Feb 2020 19:06) (75 - 78)  BP: 152/52 (15 Feb 2020 19:06) (130/62 - 152/52)  BP(mean): --  RR: 20 (15 Feb 2020 19:06) (18 - 20)  SpO2: 98% (15 Feb 2020 19:06) (96% - 98%)    General: NAD    HEENT: NCAT.  PERRL.  EOMI.  No scleral icterus or injection.  Moist MM.     Neck: Supple.  Full ROM.  No JVD.   Heart: RRR.  Normal S1 and S2.  No murmurs   Lungs: CTAB. No wheezes   Abdomen: BS+, soft, NT/ND.    Skin: Warm and dry.  No rashes.  Extremities: No edema, clubbing, or cyanosis.  2+ peripheral pulses b/l.  Musculoskeletal: No spinal or paraspinal tenderness.  Neuro: A&Ox2 (to self, knows she is at a hospital),  4+/5 strength in UE and LE b/l, sensory grossly intact in all 4 extremities PHYSICAL EXAM:    Vital Signs Last 24 Hrs  T(C): 36.4 (15 Feb 2020 19:06), Max: 36.5 (15 Feb 2020 15:16)  T(F): 97.6 (15 Feb 2020 19:06), Max: 97.7 (15 Feb 2020 15:16)  HR: 75 (15 Feb 2020 19:06) (75 - 78)  BP: 152/52 (15 Feb 2020 19:06) (130/62 - 152/52)  BP(mean): --  RR: 20 (15 Feb 2020 19:06) (18 - 20)  SpO2: 98% (15 Feb 2020 19:06) (96% - 98%)    General: NAD    HEENT: NCAT.  PERRL.  EOMI.  No scleral icterus or injection.  Moist MM.     Neck: Supple.  Full ROM.  No JVD.   Heart: RRR.  Normal S1 and S2.  No murmurs   Lungs: CTAB. No wheezes   Abdomen: BS+, soft, NT/ND.    Skin: Warm and dry.  No rashes.  Extremities: No edema, clubbing, or cyanosis.  2+ peripheral pulses b/l.  Musculoskeletal: No spinal or paraspinal tenderness.  Neuro: A&Ox2 (to self, knows she is at a hospital),  4+/5 strength in UE and LE b/l, sensory grossly intact in all 4 extremities [attending exam - A&O x person only however saw later in night, likely degree of sundowning]

## 2020-02-15 NOTE — ED PROVIDER NOTE - ATTENDING CONTRIBUTION TO CARE
agree with resident note    "75yo F h/o dementia, HTN, HLD, hypothyroidism here with increasing inability to take care of self"  Pt has been living alone for approx 8 years since  passed away.  Son has cameras in house and calls her in morning to help guide her through activities.  States has become slower and more labored even getting upstairs.  has had to come over to bathe her.  Pt has been more inactive and just eating and sleeping on couch.  with HHA only 3 days a week feels it is time for placement.    PE: A+O X3; well appearing; VSS; CTAB/L; s1 s2 no m/r/g abd soft/NT/ND ext: no edema Neuro: Cns intact 5/5 motor UE and LE; sensation intact    Imp: worsening dementia unable to care for herself; will get labs and admit for NH placement

## 2020-02-15 NOTE — H&P ADULT - NSICDXPASTMEDICALHX_GEN_ALL_CORE_FT
PAST MEDICAL HISTORY:  Alzheimer disease     Arthritis     HLD (hyperlipidemia)     HTN (hypertension)     Hypothyroidism

## 2020-02-15 NOTE — H&P ADULT - NSHPLABSRESULTS_GEN_ALL_CORE
PHYSICAL EXAM:    Vital Signs Last 24 Hrs  T(C): 36.4 (15 Feb 2020 19:06), Max: 36.5 (15 Feb 2020 15:16)  T(F): 97.6 (15 Feb 2020 19:06), Max: 97.7 (15 Feb 2020 15:16)  HR: 75 (15 Feb 2020 19:06) (75 - 78)  BP: 152/52 (15 Feb 2020 19:06) (130/62 - 152/52)  BP(mean): --  RR: 20 (15 Feb 2020 19:06) (18 - 20)  SpO2: 98% (15 Feb 2020 19:06) (96% - 98%)    General: No acute distress.  HEENT: NCAT.  PERRL.  EOMI.  No scleral icterus or injection.  Moist MM.  No oropharyngeal exudates.    Neck: Supple.  Full ROM.  No JVD.  No thyromegaly. No lymphadenopathy.   Heart: RRR.  Normal S1 and S2.  No murmurs, rubs, or gallops.   Lungs: CTAB. No wheezes, crackles, or rhonchi.    Abdomen: BS+, soft, NT/ND.  No organomegaly.  Skin: Warm and dry.  No rashes.  Extremities: No edema, clubbing, or cyanosis.  2+ peripheral pulses b/l.  Musculoskeletal: No deformities.  No spinal or paraspinal tenderness.  Neuro: A&Ox3.  CN II-XII intact.  5/5 strength in UE and LE b/l.  Tactile sensation intact in UE and LE b/l.  Cerebellar function intact personally reviewed lab, imaging and EKG      CBC Full  -  ( 15 Feb 2020 17:47 )  WBC Count : 15.75 K/uL  Hemoglobin : 12.2 g/dL  Hematocrit : 36.9 %  Platelet Count - Automated : 251 K/uL  Mean Cell Volume : 92.5 fL  Mean Cell Hemoglobin : 30.6 pg  Mean Cell Hemoglobin Concentration : 33.1 %  Auto Neutrophil # : 12.20 K/uL  Auto Lymphocyte # : 1.79 K/uL  Auto Monocyte # : 1.58 K/uL  Auto Eosinophil # : 0.04 K/uL  Auto Basophil # : 0.06 K/uL  Auto Neutrophil % : 77.4 %  Auto Lymphocyte % : 11.4 %  Auto Monocyte % : 10.0 %  Auto Eosinophil % : 0.3 %  Auto Basophil % : 0.4 %    02-15    129<L>  |  91<L>  |  19  ----------------------------<  337<H>  4.4   |  26  |  1.15    Ca    9.6      15 Feb 2020 17:47  Phos  2.6     -15  Mg     1.7     -15    TPro  7.5  /  Alb  3.7  /  TBili  0.4  /  DBili  x   /  AST  13  /  ALT  15  /  AlkPhos  71  02-15    LIVER FUNCTIONS - ( 15 Feb 2020 17:47 )  Alb: 3.7 g/dL / Pro: 7.5 g/dL / ALK PHOS: 71 u/L / ALT: 15 u/L / AST: 13 u/L / GGT: x           Urinalysis Basic - ( 15 Feb 2020 18:20 )    Color: YELLOW / Appearance: Lt TURBID / S.025 / pH: 5.5  Gluc: 1000 / Ketone: NEGATIVE  / Bili: NEGATIVE / Urobili: NORMAL   Blood: NEGATIVE / Protein: 50 / Nitrite: NEGATIVE   Leuk Esterase: TRACE / RBC: 3-5 / WBC 6-10   Sq Epi: FEW / Non Sq Epi: x / Bacteria: NEGATIVE    EKG: pending   28 12.2   15.75 )-----------( 251      ( 15 Feb 2020 17:47 )             36.9     02-15    129<L>  |  91<L>  |  19  ----------------------------<  337<H>  4.4   |  26  |  1.15    Ca    9.6      15 Feb 2020 17:47  Phos  2.6     02-15  Mg     1.7     02-15    TPro  7.5  /  Alb  3.7  /  TBili  0.4  /  DBili  x   /  AST  13  /  ALT  15  /  AlkPhos  71  02-15    17:47 - VBG - pH: 7.37  | pCO2: 51    | pO2: 28    | Lactate: 1.8      Thyroid Stimulating Hormone, Serum: 3.61 uIU/mL (02.15.20 @ 17:47)    Urinalysis Basic - ( 15 Feb 2020 18:20 )  Color: YELLOW / Appearance: Lt TURBID / S.025 / pH: 5.5  Gluc: 1000 / Ketone: NEGATIVE  / Bili: NEGATIVE / Urobili: NORMAL   Blood: NEGATIVE / Protein: 50 / Nitrite: NEGATIVE   Leuk Esterase: TRACE / RBC: 3-5 / WBC 6-10   Sq Epi: FEW / Non Sq Epi: x / Bacteria: NEGATIVE    CXR personally reviewed - no focal consolidations     EKG: pending 12.2   15.75 )-----------( 251      ( 15 Feb 2020 17:47 )             36.9     02-15    129<L>  |  91<L>  |  19  ----------------------------<  337<H>  4.4   |  26  |  1.15    Ca    9.6      15 Feb 2020 17:47  Phos  2.6     02-15  Mg     1.7     02-15    TPro  7.5  /  Alb  3.7  /  TBili  0.4  /  DBili  x   /  AST  13  /  ALT  15  /  AlkPhos  71  02-15    17:47 - VBG - pH: 7.37  | pCO2: 51    | pO2: 28    | Lactate: 1.8      Thyroid Stimulating Hormone, Serum: 3.61 uIU/mL (02.15.20 @ 17:47)    Urinalysis Basic - ( 15 Feb 2020 18:20 )  Color: YELLOW / Appearance: Lt TURBID / S.025 / pH: 5.5  Gluc: 1000 / Ketone: NEGATIVE  / Bili: NEGATIVE / Urobili: NORMAL   Blood: NEGATIVE / Protein: 50 / Nitrite: NEGATIVE   Leuk Esterase: TRACE / RBC: 3-5 / WBC 6-10   Sq Epi: FEW / Non Sq Epi: x / Bacteria: NEGATIVE    CXR personally reviewed - no focal consolidations     EKG personally reviewed - 66bpm NSR Q/TWI in III; TWI in V1; QTc 469ms 12.2   15.75 )-----------( 251      ( 15 Feb 2020 17:47 )             36.9     02-15    129<L>  |  91<L>  |  19  ----------------------------<  337<H>  4.4   |  26  |  1.15    Ca    9.6      15 Feb 2020 17:47  Phos  2.6     -15  Mg     1.7     02-15    TPro  7.5  /  Alb  3.7  /  TBili  0.4  /  DBili  x   /  AST  13  /  ALT  15  /  AlkPhos  71  02-15    17:47 - VBG - pH: 7.37  | pCO2: 51    | pO2: 28    | Lactate: 1.8      Thyroid Stimulating Hormone, Serum: 3.61 uIU/mL (02.15.20 @ 17:47)    Urinalysis Basic - ( 15 Feb 2020 18:20 )  Color: YELLOW / Appearance: Lt TURBID / S.025 / pH: 5.5  Gluc: 1000 / Ketone: NEGATIVE  / Bili: NEGATIVE / Urobili: NORMAL   Blood: NEGATIVE / Protein: 50 / Nitrite: NEGATIVE   Leuk Esterase: TRACE / RBC: 3-5 / WBC 6-10   Sq Epi: FEW / Non Sq Epi: x / Bacteria: NEGATIVE    CXR personally reviewed - no focal consolidations     EKG personally reviewed - 66bpm NSR Q/TWI in III; TWI in V1; QTc 469ms (noted S1Q3T3 present on prior in [employer] 2016)

## 2020-02-15 NOTE — H&P ADULT - PROBLEM SELECTOR PLAN 8
Transitions of Care Status:  1.  Name of PCP: Dr. Gray   2.  PCP Contacted on Admission: [ ] Y    [x ] N    3.  PCP contacted at Discharge: [ ] Y    [ ] N    [ ] N/A  4.  Post-Discharge Appointment Date and Location:  5.  Summary of Handoff given to PCP: DVT prophylaxis: Lovenox   Diet: DM diet

## 2020-02-15 NOTE — ED ADULT NURSE NOTE - OBJECTIVE STATEMENT
Patient BIB son, stating that pt has become increasingly more unable to care for herself at home. He states that she has not been able to ambulate up the stairs as well, she had an episode of incontinence yesterday, and she needs more help with her ADLs which is a change from baseline. Pt is afebrile in ED. A/O x2. Pt appears comfortable/NAD noted at this time. Pt's son at bedside. Patient BIB son, stating that pt has become increasingly less able to care for herself at home. He states that she has not been able to ambulate up the stairs as well, she had an episode of incontinence yesterday, and she needs more help with her ADLs which is a change from baseline. Pt is afebrile in ED. A/O x2. Pt appears comfortable/NAD noted at this time. Pt's son at bedside.

## 2020-02-15 NOTE — H&P ADULT - NSHPSOCIALHISTORY_GEN_ALL_CORE
lives independently alone at home   denies EtOH, smoking and illicit drug hx  previously worked as a  in an office lives independently alone at home with [facility] 3x/week  denies EtOH, smoking and illicit drug hx  previously worked as a  in an office

## 2020-02-15 NOTE — ED PROVIDER NOTE - CLINICAL SUMMARY MEDICAL DECISION MAKING FREE TEXT BOX
Jasmeet PGY-2:  73yo F w/ HX dementia brought by son as concern for no longer being able to take care of self, will obtain ua, cxr, bloodwork including TSH and likely admit for placement if no reversible etiology of increasing gradual generalized confusion/weakness identified

## 2020-02-15 NOTE — H&P ADULT - PROBLEM SELECTOR PLAN 1
- presented with  leukocytosis (15K), no bands, no clear source of infection, CXR: clear lungs, UA: negative for infection   - TSH wnl  - per family, pt is at baseline mental status. no need for CT head   - c/w maintenance fluid   - PT consult in AM - presented with leukocytosis (15K), no bands, no clear source of infection  - CXR: clear lungs, UA: negative for infection, no UTI symptoms   - TSH wnl  - per family, pt is at baseline mental status, no recent falls, no need for CT head   - s/p 1L of IVF in ED, c/w maintenance fluid @100ml/hr   - PT consult in AM  - Nutritional consult in AM  - son prefers assisted living for future placement - presented with leukocytosis (15K), no bands, no clear source of infection  - CXR: clear lungs, UA bland, from clean catch, no UTI symptoms   - TSH wnl  - per family, pt is at baseline mental status, no recent falls, no need for CT head   - s/p 1L of IVF in ED, c/w maintenance fluid @100ml/hr   - PT consult in AM, fall precautions  - Nutritional consult in AM  - son prefers assisted living for upon discharge - presented with leukocytosis (15K), no bands, no clear source of infection  - CXR: clear lungs, f/u official read  - UA bland, from clean catch, no UTI symptoms, f/u UCx, treat if positive  - TSH wnl  - per family, pt is at baseline mental status, no recent falls, no need for CT head   - s/p 1L of IVF in ED, c/w maintenance fluid @100ml/hr   - PT consult in AM, fall precautions  - Nutritional consult in AM  - son prefers assisted living for upon discharge

## 2020-02-15 NOTE — H&P ADULT - NSHPREVIEWOFSYSTEMS_GEN_ALL_CORE
REVIEW OF SYSTEMS:    CONSTITUTIONAL: No weakness, fevers or chills  EYES/ENT: No visual changes;  No vertigo or throat pain   NECK: No pain or stiffness  ENDOCRINE: no cold/hold intolerance, no polydipsia, no polyuria   HEMATOLOGY: no bruising, no bleeding, no lymphadenopathy   RESPIRATORY: No cough, wheezing, hemoptysis; No shortness of breath  CARDIOVASCULAR: No chest pain or palpitations  GASTROINTESTINAL: No abdominal pain; nausea, vomiting;  diarrhea or constipation. No hemetemesis, melena or hematochezia.  GENITOURITARY: No dysuria, frequency or hematuria  NEUROLOGICAL: No numbness or weakness  MUSCULOSKELETAL: no back pain, no spine deformity, no joint pain or deformity, no muscle ache   SKIN: No itching, burning, rashes, or lesions REVIEW OF SYSTEMS:    CONSTITUTIONAL: No weakness. + chills, + mild headache   EYES/ENT: No visual changes   HEMATOLOGY: no bruising, no bleeding, no lymphadenopathy   RESPIRATORY: No cough, wheezing, hemoptysis; No shortness of breath  CARDIOVASCULAR: No chest pain or palpitations  GASTROINTESTINAL: No abdominal pain; nausea, vomiting;  diarrhea or constipation. No hemetemesis, melena or hematochezia.  GENITOURITARY: No dysuria, frequency or hematuria  NEUROLOGICAL: No numbness or weakness  MUSCULOSKELETAL: no back pain, no spine deformity, no joint pain or deformity, no muscle ache   SKIN: No itching, burning, rashes, or lesions REVIEW OF SYSTEMS:    CONSTITUTIONAL: No weakness. + chills, + mild headache [HA resolved on attending exam]  EYES/ENT: No visual changes   HEMATOLOGY: no bruising, no bleeding, no lymphadenopathy   RESPIRATORY: No cough, wheezing, hemoptysis; No shortness of breath  CARDIOVASCULAR: No chest pain or palpitations  GASTROINTESTINAL: No abdominal pain; nausea, vomiting;  diarrhea or constipation. No hematemesis, melena or hematochezia.  GENITOURINARY: No dysuria, frequency or hematuria  NEUROLOGICAL: No numbness or weakness  MUSCULOSKELETAL: no back pain, no spine deformity, no joint pain or deformity, no muscle ache   SKIN: No itching, burning, rashes, or lesions

## 2020-02-15 NOTE — H&P ADULT - HISTORY OF PRESENT ILLNESS
74y woman with Alzheimer's disease, HLD, HTN, Hypothyroidism, Depression, brought in by family member for increased inability to take care of herself.    ED course:   vitals: afebrile, HR 75, BP 130s-150s/50s-60s, 98% on RA   EKG:   CXR: clear lungs   s/p 1L of IVF 74y woman with Alzheimer's disease, HTN, Hypothyroidism, Depression, gout, brought in by family member for increased inability to take care of herself. Per pt's son (Christopher) at bedside, pt is at her baseline mental status, A&Ox 2 (to self and location), however, she became increasingly more confused and needed more assistance with ADLS and IADS.  Son endorses a gradual decline in mental status, and pt was admitted in Dec 2018 for similar complain. Pt was found by son this morning covered in urine, pt was unable to get up from bed and walk down the stairs. Pt has unsteady gait at baseline (does not use walker or cane), but has no focal weakness. Pt lives alone in a house, has a home health aide 3 days per week, has good appetite, goes to senior center 2x per week . Pt endorses subjective chills, mild headache, and neck soreness, denies fever, SOB, CP, n/v. No sick contact at home, no recent travel.         ED course:   vitals: afebrile, HR 75, BP 130s-150s/50s-60s, 98% on RA  CXR: clear lungs   UA: negative for infection    s/p 1L of IVF 74y woman with Alzheimer's disease, HTN, Hypothyroidism, Depression, gout, brought in by family member for increased inability to take care of herself. Per pt's son (Christohper) at bedside, pt is at her baseline mental status, A&Ox 2 (to self and location), however, she became increasingly more confused and needed more assistance with ADLS and IADS.  Son endorses a gradual decline in mental status, and pt was admitted in Dec 2018 for similar complain. Pt was found by son this morning covered in urine, pt was unable to get up from bed and walk down the stairs. Pt has unsteady gait at baseline (does not use walker or cane), but has no focal weakness. Pt lives alone in a house, has a home health aide 3 days per week, has good appetite, goes to senior center 2x per week . Pt endorses subjective chills, mild headache, and neck soreness, denies fever, SOB, CP, n/v. No sick contact at home, no recent travel. no recent fall      ED course:   vitals: afebrile, HR 75, BP 130s-150s/50s-60s, 98% on RA  CXR: clear lungs   UA: negative for infection    s/p 1L of IVF 74y woman with Alzheimer's disease, HTN, Hypothyroidism, Depression, gout, brought in by family member for increased inability to take care of herself. Per pt's son (Ollie) at bedside, pt is at her baseline mental status, A&Ox 2 (to self and location), however, she became increasingly more confused and needed more assistance with ADLS and IADS.  Son endorses a gradual decline in mental status, and pt was admitted in Dec 2018 for similar complain. Pt was found by son this morning covered in urine, pt was unable to get up from bed and walk down the stairs. Pt has unsteady gait at baseline (does not use walker or cane), but has no focal weakness. Pt lives alone in a house, has a home health aide 3 days per week, has good appetite, goes to senior center 2x per week . Pt endorses subjective chills, mild headache, and neck soreness, denies fever, SOB, CP, n/v. No sick contact at home, no recent travel. no recent fall      ED course:   vitals: afebrile, HR 75, BP 130s-150s/50s-60s, 98% on RA  CXR: clear lungs   UA: negative for infection    s/p 1L of IVF 74y woman with Alzheimer's disease, HTN, Hypothyroidism, Depression, gout, brought in by family member for increased inability to take care of herself. Per pt's son (Ollie) at bedside, pt is at her baseline mental status, A&Ox 2 (to self and location), however, she became increasingly more confused and needed more assistance with ADLS and IADS.  Son endorses a gradual decline in mental status, and pt was admitted in Dec 2018 for similar complaint. Pt was found by son this morning covered in urine, pt was unable to get up from bed and walk down the stairs. Pt has unsteady gait at baseline (does not use walker or cane), but has no focal weakness. Pt lives alone in a house, has a home health aide 3 days per week, has good appetite, goes to senior center 2x per week . Pt endorses subjective chills, mild headache, and neck soreness, denies fever, SOB, CP, n/v. No sick contact at home, no recent travel. no recent fall      ED course:   vitals: afebrile, HR 75, BP 130s-150s/50s-60s, 98% on RA  CXR: clear lungs   UA: negative for infection    s/p 1L of IVF 74y woman with Alzheimer's disease, HTN, Hypothyroidism, Depression, gout, brought in by family member for increased inability to take care of herself. Per pt's son (Ollie) at bedside, pt is at her baseline mental status, A&Ox 2 (to self and location), however, she became increasingly more confused and needed more assistance with ADLS and IADS.  Son endorses a gradual decline in mental status, and pt was admitted in Dec 2018 for similar complaint. Pt was found by son this morning covered in urine, pt was unable to get up from bed and walk down the stairs. Pt has unsteady gait at baseline (does not use walker or cane), but has no focal weakness. Pt lives alone in a house, has a home health aide 3 days per week, has good appetite, goes to senior center 2x per week . Pt endorses subjective chills, mild headache, and neck soreness, denies fever, SOB, CP, n/v. No sick contact at home, no recent travel. no recent fall.    ED course:   vitals: afebrile, HR 75, BP 130s-150s/50s-60s, 98% on RA  CXR: clear lungs   UA: negative for infection    s/p 1L of IVF

## 2020-02-15 NOTE — H&P ADULT - PROBLEM SELECTOR PLAN 9
Transitions of Care Status:  1.  Name of PCP: Dr. Gray   2.  PCP Contacted on Admission: [ ] Y    [x ] N    3.  PCP contacted at Discharge: [ ] Y    [ ] N    [ ] N/A  4.  Post-Discharge Appointment Date and Location:  5.  Summary of Handoff given to PCP: Transitions of Care Status:  1.  Name of PCP: Dr. Gray   2.  PCP Contacted on Admission: [ ] Y    [x ] N  - Admitted at night  3.  PCP contacted at Discharge: [ ] Y    [ ] N    [ ] N/A  4.  Post-Discharge Appointment Date and Location:  5.  Summary of Handoff given to PCP:

## 2020-02-15 NOTE — ED PROVIDER NOTE - PHYSICAL EXAMINATION
General: well appearing, interactive, well nourished, NAD  HEENT: pupils equal and reactive, normal external ears bilaterally   Cardiac: RRR, no MRG appreciated  Resp: lungs clear to auscultation bilaterally, symmetric chest wall rise  Abd: soft, nontender, nondistended,   : no CVA tenderness  Neuro: Moving all extremities  Skin:  normal color for race  MSK: Able to fully range both knees and ankles passively with minimal tenderness

## 2020-02-15 NOTE — H&P ADULT - ASSESSMENT
74y woman with Alzheimer's disease, HTN, Hypothyroidism, Depression, gout, brought in by family member for increased inability to take care of herself.

## 2020-02-15 NOTE — H&P ADULT - NSICDXFAMILYHX_GEN_ALL_CORE_FT
FAMILY HISTORY:  Family history of coronary artery disease in father  Family history of diabetes mellitus in father    Sibling  Still living? Unknown  Family history of dementia, Age at diagnosis: Age Unknown

## 2020-02-16 ENCOUNTER — TRANSCRIPTION ENCOUNTER (OUTPATIENT)
Age: 75
End: 2020-02-16

## 2020-02-16 DIAGNOSIS — D72.829 ELEVATED WHITE BLOOD CELL COUNT, UNSPECIFIED: ICD-10-CM

## 2020-02-16 LAB
ALBUMIN SERPL ELPH-MCNC: 3.2 G/DL — LOW (ref 3.3–5)
ALP SERPL-CCNC: 77 U/L — SIGNIFICANT CHANGE UP (ref 40–120)
ALT FLD-CCNC: 13 U/L — SIGNIFICANT CHANGE UP (ref 4–33)
ANION GAP SERPL CALC-SCNC: 16 MMO/L — HIGH (ref 7–14)
ANION GAP SERPL CALC-SCNC: 17 MMO/L — HIGH (ref 7–14)
AST SERPL-CCNC: 27 U/L — SIGNIFICANT CHANGE UP (ref 4–32)
BASOPHILS # BLD AUTO: 0.06 K/UL — SIGNIFICANT CHANGE UP (ref 0–0.2)
BASOPHILS NFR BLD AUTO: 0.4 % — SIGNIFICANT CHANGE UP (ref 0–2)
BILIRUB SERPL-MCNC: 0.4 MG/DL — SIGNIFICANT CHANGE UP (ref 0.2–1.2)
BUN SERPL-MCNC: 14 MG/DL — SIGNIFICANT CHANGE UP (ref 7–23)
BUN SERPL-MCNC: 14 MG/DL — SIGNIFICANT CHANGE UP (ref 7–23)
CALCIUM SERPL-MCNC: 9.2 MG/DL — SIGNIFICANT CHANGE UP (ref 8.4–10.5)
CALCIUM SERPL-MCNC: 9.3 MG/DL — SIGNIFICANT CHANGE UP (ref 8.4–10.5)
CHLORIDE SERPL-SCNC: 94 MMOL/L — LOW (ref 98–107)
CHLORIDE SERPL-SCNC: 95 MMOL/L — LOW (ref 98–107)
CO2 SERPL-SCNC: 19 MMOL/L — LOW (ref 22–31)
CO2 SERPL-SCNC: 19 MMOL/L — LOW (ref 22–31)
CREAT SERPL-MCNC: 0.77 MG/DL — SIGNIFICANT CHANGE UP (ref 0.5–1.3)
CREAT SERPL-MCNC: 0.92 MG/DL — SIGNIFICANT CHANGE UP (ref 0.5–1.3)
EOSINOPHIL # BLD AUTO: 0.23 K/UL — SIGNIFICANT CHANGE UP (ref 0–0.5)
EOSINOPHIL NFR BLD AUTO: 1.7 % — SIGNIFICANT CHANGE UP (ref 0–6)
GLUCOSE BLDC GLUCOMTR-MCNC: 239 MG/DL — HIGH (ref 70–99)
GLUCOSE BLDC GLUCOMTR-MCNC: 252 MG/DL — HIGH (ref 70–99)
GLUCOSE BLDC GLUCOMTR-MCNC: 277 MG/DL — HIGH (ref 70–99)
GLUCOSE SERPL-MCNC: 285 MG/DL — HIGH (ref 70–99)
GLUCOSE SERPL-MCNC: 302 MG/DL — HIGH (ref 70–99)
HBA1C BLD-MCNC: 10.5 % — HIGH (ref 4–5.6)
HCT VFR BLD CALC: 36.9 % — SIGNIFICANT CHANGE UP (ref 34.5–45)
HGB BLD-MCNC: 12.5 G/DL — SIGNIFICANT CHANGE UP (ref 11.5–15.5)
IMM GRANULOCYTES NFR BLD AUTO: 0.4 % — SIGNIFICANT CHANGE UP (ref 0–1.5)
LYMPHOCYTES # BLD AUTO: 1.81 K/UL — SIGNIFICANT CHANGE UP (ref 1–3.3)
LYMPHOCYTES # BLD AUTO: 13.1 % — SIGNIFICANT CHANGE UP (ref 13–44)
MAGNESIUM SERPL-MCNC: 1.7 MG/DL — SIGNIFICANT CHANGE UP (ref 1.6–2.6)
MCHC RBC-ENTMCNC: 31.7 PG — SIGNIFICANT CHANGE UP (ref 27–34)
MCHC RBC-ENTMCNC: 33.9 % — SIGNIFICANT CHANGE UP (ref 32–36)
MCV RBC AUTO: 93.7 FL — SIGNIFICANT CHANGE UP (ref 80–100)
MONOCYTES # BLD AUTO: 1.23 K/UL — HIGH (ref 0–0.9)
MONOCYTES NFR BLD AUTO: 8.9 % — SIGNIFICANT CHANGE UP (ref 2–14)
MORPHOLOGY BLD-IMP: SIGNIFICANT CHANGE UP
NEUTROPHILS # BLD AUTO: 10.41 K/UL — HIGH (ref 1.8–7.4)
NEUTROPHILS NFR BLD AUTO: 75.5 % — SIGNIFICANT CHANGE UP (ref 43–77)
NRBC # FLD: 0 K/UL — SIGNIFICANT CHANGE UP (ref 0–0)
PHOSPHATE SERPL-MCNC: 2.7 MG/DL — SIGNIFICANT CHANGE UP (ref 2.5–4.5)
PLATELET # BLD AUTO: 159 K/UL — SIGNIFICANT CHANGE UP (ref 150–400)
PMV BLD: 11.3 FL — SIGNIFICANT CHANGE UP (ref 7–13)
POTASSIUM SERPL-MCNC: 4 MMOL/L — SIGNIFICANT CHANGE UP (ref 3.5–5.3)
POTASSIUM SERPL-MCNC: 5.7 MMOL/L — HIGH (ref 3.5–5.3)
POTASSIUM SERPL-SCNC: 4 MMOL/L — SIGNIFICANT CHANGE UP (ref 3.5–5.3)
POTASSIUM SERPL-SCNC: 5.7 MMOL/L — HIGH (ref 3.5–5.3)
PROT SERPL-MCNC: 7.3 G/DL — SIGNIFICANT CHANGE UP (ref 6–8.3)
RBC # BLD: 3.94 M/UL — SIGNIFICANT CHANGE UP (ref 3.8–5.2)
RBC # FLD: 12.5 % — SIGNIFICANT CHANGE UP (ref 10.3–14.5)
SODIUM SERPL-SCNC: 130 MMOL/L — LOW (ref 135–145)
SODIUM SERPL-SCNC: 130 MMOL/L — LOW (ref 135–145)
WBC # BLD: 13.79 K/UL — HIGH (ref 3.8–10.5)
WBC # FLD AUTO: 13.79 K/UL — HIGH (ref 3.8–10.5)

## 2020-02-16 PROCEDURE — 99223 1ST HOSP IP/OBS HIGH 75: CPT | Mod: GC

## 2020-02-16 PROCEDURE — 12345: CPT | Mod: NC,GC

## 2020-02-16 RX ORDER — INSULIN GLARGINE 100 [IU]/ML
10 INJECTION, SOLUTION SUBCUTANEOUS AT BEDTIME
Refills: 0 | Status: DISCONTINUED | OUTPATIENT
Start: 2020-02-16 | End: 2020-02-17

## 2020-02-16 RX ORDER — INSULIN LISPRO 100/ML
VIAL (ML) SUBCUTANEOUS
Refills: 0 | Status: DISCONTINUED | OUTPATIENT
Start: 2020-02-16 | End: 2020-02-17

## 2020-02-16 RX ORDER — DEXTROSE 50 % IN WATER 50 %
25 SYRINGE (ML) INTRAVENOUS ONCE
Refills: 0 | Status: DISCONTINUED | OUTPATIENT
Start: 2020-02-16 | End: 2020-02-21

## 2020-02-16 RX ORDER — DEXTROSE 50 % IN WATER 50 %
15 SYRINGE (ML) INTRAVENOUS ONCE
Refills: 0 | Status: DISCONTINUED | OUTPATIENT
Start: 2020-02-16 | End: 2020-02-21

## 2020-02-16 RX ORDER — GLUCAGON INJECTION, SOLUTION 0.5 MG/.1ML
1 INJECTION, SOLUTION SUBCUTANEOUS ONCE
Refills: 0 | Status: DISCONTINUED | OUTPATIENT
Start: 2020-02-16 | End: 2020-02-21

## 2020-02-16 RX ORDER — SODIUM CHLORIDE 9 MG/ML
1000 INJECTION, SOLUTION INTRAVENOUS
Refills: 0 | Status: DISCONTINUED | OUTPATIENT
Start: 2020-02-16 | End: 2020-02-21

## 2020-02-16 RX ORDER — DONEPEZIL HYDROCHLORIDE 10 MG/1
10 TABLET, FILM COATED ORAL DAILY
Refills: 0 | Status: DISCONTINUED | OUTPATIENT
Start: 2020-02-16 | End: 2020-02-21

## 2020-02-16 RX ORDER — DEXTROSE 50 % IN WATER 50 %
12.5 SYRINGE (ML) INTRAVENOUS ONCE
Refills: 0 | Status: DISCONTINUED | OUTPATIENT
Start: 2020-02-16 | End: 2020-02-21

## 2020-02-16 RX ORDER — INSULIN LISPRO 100/ML
2 VIAL (ML) SUBCUTANEOUS
Refills: 0 | Status: DISCONTINUED | OUTPATIENT
Start: 2020-02-16 | End: 2020-02-17

## 2020-02-16 RX ADMIN — Medication 75 MICROGRAM(S): at 06:25

## 2020-02-16 RX ADMIN — DONEPEZIL HYDROCHLORIDE 10 MILLIGRAM(S): 10 TABLET, FILM COATED ORAL at 13:10

## 2020-02-16 RX ADMIN — Medication 2: at 09:24

## 2020-02-16 RX ADMIN — Medication 2 UNIT(S): at 13:09

## 2020-02-16 RX ADMIN — ENOXAPARIN SODIUM 40 MILLIGRAM(S): 100 INJECTION SUBCUTANEOUS at 13:11

## 2020-02-16 RX ADMIN — INSULIN GLARGINE 10 UNIT(S): 100 INJECTION, SOLUTION SUBCUTANEOUS at 22:24

## 2020-02-16 RX ADMIN — Medication 2 UNIT(S): at 18:13

## 2020-02-16 RX ADMIN — Medication 0.6 MILLIGRAM(S): at 14:19

## 2020-02-16 RX ADMIN — ESCITALOPRAM OXALATE 20 MILLIGRAM(S): 10 TABLET, FILM COATED ORAL at 13:10

## 2020-02-16 RX ADMIN — Medication 6: at 18:13

## 2020-02-16 RX ADMIN — RISPERIDONE 0.5 MILLIGRAM(S): 4 TABLET ORAL at 22:24

## 2020-02-16 RX ADMIN — Medication 4: at 13:10

## 2020-02-16 NOTE — PROGRESS NOTE ADULT - ATTENDING COMMENTS
Patient was seen and examined personally by me. I have discussed the plan and reviewed the resident's note and agree with the above physical exam findings including assessment and plan except as indicated below.    F/u on labs today to ensure WBC downtrending. Given neutrophil predominance and bandemia on admission CBC, send Bcx.  A1C: 10.5 only on metformin at home. Start Lantus 10 U and humalog 2 U premeal TID. Endocrine consult  CXR and UA negative, no other sources of infection identified at this time.  PT consult  Son considering 24 hour care for patient as patient unable to care for self. SW to be involved

## 2020-02-16 NOTE — DISCHARGE NOTE PROVIDER - NSDCMRMEDTOKEN_GEN_ALL_CORE_FT
Colcrys 0.6 mg oral tablet: 1 tab(s) orally once a day  donepezil 10 mg oral tablet: 1 tab(s) orally once a day  escitalopram 20 mg oral tablet: 1 tab(s) orally once a day  levothyroxine 75 mcg (0.075 mg) oral tablet: 1 tab(s) orally once a day  memantine 7 mg oral capsule, extended release: 1 cap(s) orally once a day  metFORMIN 500 mg oral tablet, extended release: 1 tab(s) orally once a day  risperiDONE 0.5 mg oral tablet: 1 tab(s) orally once a day (in the evening) atorvastatin 20 mg oral tablet: 1 tab(s) orally once a day (at bedtime)  Colcrys 0.6 mg oral tablet: 1 tab(s) orally once a day  donepezil 10 mg oral tablet: 1 tab(s) orally once a day  enoxaparin: 40 milligram(s) subcutaneous once a day  escitalopram 20 mg oral tablet: 1 tab(s) orally once a day  insulin glargine: 20 unit(s) subcutaneous once a day (at bedtime)  insulin lispro: 7 unit(s) subcutaneous 3 times a day (before meals)  insulin lispro: 7 unit(s) subcutaneous 3 times a day (before meals)  levothyroxine 75 mcg (0.075 mg) oral tablet: 1 tab(s) orally once a day  memantine 7 mg oral capsule, extended release: 1 cap(s) orally once a day  risperiDONE 0.5 mg oral tablet: 1 tab(s) orally once a day (in the evening) atorvastatin 20 mg oral tablet: 1 tab(s) orally once a day (at bedtime)  Colcrys 0.6 mg oral tablet: 1 tab(s) orally once a day  donepezil 10 mg oral tablet: 1 tab(s) orally once a day  enoxaparin: 40 milligram(s) subcutaneous once a day  escitalopram 20 mg oral tablet: 1 tab(s) orally once a day  insulin glargine: 20 unit(s) subcutaneous once a day (at bedtime)  insulin lispro: 7 unit(s) subcutaneous 3 times a day (before meals)  insulin lispro: 7 unit(s) subcutaneous 3 times a day (before meals)  insulin lispro: corrective sliding scale  levothyroxine 75 mcg (0.075 mg) oral tablet: 1 tab(s) orally once a day  memantine 7 mg oral capsule, extended release: 1 cap(s) orally once a day  risperiDONE 0.5 mg oral tablet: 1 tab(s) orally once a day (in the evening)

## 2020-02-16 NOTE — PROGRESS NOTE ADULT - PROBLEM SELECTOR PLAN 6
- continue home Escitalopram 20mg, Risperidone 0.5mg NIDDM2 w/ hyperglycemia, A1c 10.5  - start lantus 10u qhs, humalog 2u tid  - increased to moderate correction ISS  - endocrine emailed f/u recs  - dietician consult placed  - on oral metformin at home

## 2020-02-16 NOTE — DISCHARGE NOTE PROVIDER - PROVIDER TOKENS
FREE:[LAST:[Dr. langford],PHONE:[(   )    -],FAX:[(   )    -],FOLLOWUP:[1 week]] FREE:[LAST:[Dr. langford],PHONE:[(   )    -],FAX:[(   )    -],FOLLOWUP:[1 week]],PROVIDER:[TOKEN:[[id number]]]

## 2020-02-16 NOTE — DISCHARGE NOTE PROVIDER - CARE PROVIDER_API CALL
Dr. langford,   Phone: (   )    -  Fax: (   )    -  Follow Up Time: 1 week Dr. langford,   Phone: (   )    -  Fax: (   )    -  Follow Up Time: 1 week    Yoanna Veronica)  Internal Medicine  [address] [address]  Phone: [phone]  Follow Up Time:

## 2020-02-16 NOTE — PHYSICAL THERAPY INITIAL EVALUATION ADULT - PERTINENT HX OF CURRENT PROBLEM, REHAB EVAL
Patient is 74 year old female admitted with history of alzheimer's disease, HTN, hypothyroidism, gout, depression, presents with inability to walk.

## 2020-02-16 NOTE — DIETITIAN INITIAL EVALUATION ADULT. - PERTINENT LABORATORY DATA
02-16 Na130 mmol/L<L> Glu 302 mg/dL<H> K+ 4.0 mmol/L Cr  0.92 mg/dL BUN 14 mg/dL 02-16 Phos 2.7 mg/dL 02-16 Alb 3.2 g/dL<L> 02-16 [id number] 10.5 %<H>

## 2020-02-16 NOTE — PHYSICAL THERAPY INITIAL EVALUATION ADULT - PLANNED THERAPY INTERVENTIONS, PT EVAL
stairs training/strengthening/transfer training/gait training/balance training/bed mobility training

## 2020-02-16 NOTE — DISCHARGE NOTE PROVIDER - NSDCFUSCHEDAPPT_GEN_ALL_CORE_FT
LANDRY RODRÍGUEZ ; 02/19/2020 ; [facility] [address] [place] [address] LANDRY RODRÍGUEZ ; 03/11/2020 ; [facility] [address] [place] [address] LANDRY RODRÍGUEZ ; 03/11/2020 ; [facility] [address] [place] [address] LANDRY RODRÍGUEZ ; 03/11/2020 ; [facility] [address] [place] [address] LANDRY RODRÍGUEZ ; 03/11/2020 ; [facility] [address] [place] [address] LANDRY RODRÍGUEZ ; 03/11/2020 ; [facility] [address] [place] [address]

## 2020-02-16 NOTE — DISCHARGE NOTE PROVIDER - CARE PROVIDERS DIRECT ADDRESSES
,DirectAddress_Unknown ,DirectAddress_Unknown,[email or web address]

## 2020-02-16 NOTE — DISCHARGE NOTE PROVIDER - HOSPITAL COURSE
74y woman with Alzheimer's disease, HTN, Hypothyroidism, Depression, gout, NIDDM2 brought in by family member for increased inability to take care of herself, admitted for failure to thrive. Pt  has became increasingly more confused and needed more assistance with ADLS and IADLs. Pt also found to have poorly controlled diabetes mellitus with A1C 10.5 and leukocytosis with negative infectious work up (negative UA, CXR, blood cx's). Endocrinology consulted and recommended ... 74y woman with Alzheimer's disease, HTN, Hypothyroidism, Depression, gout, NIDDM2 brought in by family member for increased inability to take care of herself, admitted for failure to thrive. Pt  has became increasingly more confused and needed more assistance with ADLS and IADLs. Pt also found to have poorly controlled diabetes mellitus with A1C 10.5 and leukocytosis with negative infectious work up (negative UA, CXR, blood cx's). Endocrinology consulted and recommended maximizing dose to metformin 1000 mg po BID plus dpp4 like tradjenta or januvia, if no contraindications.  Target a1c for this patient is 7-8%.  Can consider basal insulin plus PO regimen if family is willing to do daily insulin injections.  If patient is dc'd to LTC, can do basal/po vs basal bolus. 74y woman with Alzheimer's disease, HTN, Hypothyroidism, Depression, gout, NIDDM2 brought in by family member for increased inability to take care of herself, admitted for failure to thrive. Pt  has became increasingly more confused and needed more assistance with ADLS and IADLs. Pt also found to have poorly controlled diabetes mellitus with A1C 10.5 and leukocytosis with negative infectious work up (negative UA, CXR, blood cx's). Endocrinology was consulted and recommended continuing with inpatient basal/ bolus regimen at rehab (20U lantus qhs and 7U lispro premeal TID). When pt is ready for d/c, would consider maximizing metformin dose to metformin 1000 mg po BID plus dpp4 like tradjenta or januvia; or may consider basal insulin plus PO regimen if family is willing to do daily insulin injections per endocrinology evaluation.     On day of discharge patient is medically and hemodynamically stable for discharge to [facility]. Pt's son Ollie informed throughout hospital admission.

## 2020-02-16 NOTE — PROGRESS NOTE ADULT - PROBLEM SELECTOR PLAN 5
NIDDM2 w/hyperglycemia   - on oral metformin only   - A1c in AM  - ISS  - low correction ISS NIDDM2 w/hyperglycemia   - on oral metformin only   - A1c 10.5  - low correction ISS - no clear infectious etiology at this time, CXR clear, UA negative  - blood cx's ordered  - monitor off abx for now

## 2020-02-16 NOTE — PROGRESS NOTE ADULT - PROBLEM SELECTOR PLAN 1
- presented with leukocytosis (15K), no bands, no clear source of infection  - CXR: clear lungs, f/u official read  - UA bland, from clean catch, no UTI symptoms, f/u UCx, treat if positive  - TSH wnl  - per family, pt is at baseline mental status, no recent falls, no need for CT head   - s/p 1L of IVF in ED, c/w maintenance fluid @100ml/hr   - PT consult in AM, fall precautions  - Nutritional consult in AM  - son prefers assisted living for upon discharge - presented with leukocytosis (15K), no bands, no clear source of infection  - CXR: clear lungs  - UA bland, from clean catch, no UTI symptoms, f/u UCx, treat if positive  - TSH wnl  - per family, pt is at baseline mental status, no recent falls, no need for CT head   - s/p 1L of IVF in ED, c/w maintenance fluid @100ml/hr   - PT consulted, fall precautions  - Nutritional consult in AM  - son prefers assisted living for upon discharge - presented with leukocytosis (15K), no bands, no clear source of infection  - CXR: clear lungs  - UA bland, from clean catch, no UTI symptoms, f/u UCx, treat if positive  - TSH wnl  - per family, pt is at baseline mental status, no recent falls, no need for CT head   - PT consulted, fall precautions  - Nutritional consult in AM  - son prefers assisted living for upon discharge

## 2020-02-16 NOTE — DIETITIAN INITIAL EVALUATION ADULT. - ENERGY NEEDS
Ht: 152.4cm. Wt: 85.5kg. BMI 36.8. # +/-10%.  Skin: No pressure injuries, noted healed wound to R lower buttock.   No edema per flowsheet.

## 2020-02-16 NOTE — PROGRESS NOTE ADULT - PROBLEM SELECTOR PLAN 10
Transitions of Care Status:  1.  Name of PCP: Dr. Gray   2.  PCP Contacted on Admission: [ ] Y    [x ] N  3.  PCP contacted at Discharge: [ ] Y    [ ] N    [ ] N/A  4.  Post-Discharge Appointment Date and Location:  5.  Summary of Handoff given to PCP:

## 2020-02-16 NOTE — DIETITIAN INITIAL EVALUATION ADULT. - ADD RECOMMEND
1. Continue current diet order, which remains appropriate at this time. 2. Monitor weights, labs, BM's, skin integrity, PO intake/tolerance. 3. Reinforce diet education as needed/provide diet education with family when they are available. 4. Patient to follow with an outpatient endocrinologist/RD after discharge.

## 2020-02-16 NOTE — PROGRESS NOTE ADULT - SUBJECTIVE AND OBJECTIVE BOX
Interval Events: No acute overnight events.      OBJECTIVE:  ICU Vital Signs Last 24 Hrs  T(C): 36.7 (2020 06:23), Max: 36.7 (2020 06:23)  T(F): 98.1 (2020 06:23), Max: 98.1 (2020 06:23)  HR: 73 (2020 06:23) (71 - 78)  BP: 139/52 (2020 06:23) (130/62 - 152/52)  BP(mean): --  ABP: --  ABP(mean): --  RR: 15 (2020 06:23) (15 - 20)  SpO2: 100% (2020 06:23) (96% - 100%)        CAPILLARY BLOOD GLUCOSE      POCT Blood Glucose.: 306 mg/dL (15 Feb 2020 22:46)      PHYSICAL EXAM:  General:  HEENT:   Lymph Nodes:   Neck:   Respiratory:   Cardiovascular:   Abdomen:  Extremities:  Skin:   Neurological:   Psychiatry:     HOSPITAL MEDICATIONS:  enoxaparin Injectable 40 milliGRAM(s) SubCutaneous daily        colchicine 0.6 milliGRAM(s) Oral daily  dextrose 40% Gel 15 Gram(s) Oral once PRN  dextrose 50% Injectable 12.5 Gram(s) IV Push once  dextrose 50% Injectable 25 Gram(s) IV Push once  dextrose 50% Injectable 25 Gram(s) IV Push once  glucagon  Injectable 1 milliGRAM(s) IntraMuscular once PRN  insulin lispro (HumaLOG) corrective regimen sliding scale   SubCutaneous three times a day before meals  insulin lispro (HumaLOG) corrective regimen sliding scale   SubCutaneous at bedtime  levothyroxine 75 MICROGram(s) Oral daily      donepezil 10 milliGRAM(s) Oral daily  escitalopram 20 milliGRAM(s) Oral daily  risperiDONE   Tablet 0.5 milliGRAM(s) Oral at bedtime          dextrose 5%. 1000 milliLiter(s) IV Continuous <Continuous>  sodium chloride 0.9%. 1000 milliLiter(s) IV Continuous <Continuous>            LABS:                        12.2   15.75 )-----------( 251      ( 15 Feb 2020 17:47 )             36.9     Hgb Trend: 12.2<--  02-15    129<L>  |  91<L>  |  19  ----------------------------<  337<H>  4.4   |  26  |  1.15    Ca    9.6      15 Feb 2020 17:47  Phos  2.6     02-15  Mg     1.7     02-15    TPro  7.5  /  Alb  3.7  /  TBili  0.4  /  DBili  x   /  AST  13  /  ALT  15  /  AlkPhos  71  02-15    Creatinine Trend: 1.15<--    Urinalysis Basic - ( 15 Feb 2020 18:20 )    Color: YELLOW / Appearance: Lt TURBID / S.025 / pH: 5.5  Gluc: 1000 / Ketone: NEGATIVE  / Bili: NEGATIVE / Urobili: NORMAL   Blood: NEGATIVE / Protein: 50 / Nitrite: NEGATIVE   Leuk Esterase: TRACE / RBC: 3-5 / WBC 6-10   Sq Epi: FEW / Non Sq Epi: x / Bacteria: NEGATIVE        Venous Blood Gas:  02-15 @ 17:47  7.37/51/28/26/47.5  VBG Lactate: 1.8 Interval Events: No acute overnight events. Pt without complaints this morning. Denies cp/f/c/abdominal pain/dysuria/sob.      OBJECTIVE:  ICU Vital Signs Last 24 Hrs  T(C): 36.7 (2020 06:23), Max: 36.7 (2020 06:23)  T(F): 98.1 (2020 06:23), Max: 98.1 (2020 06:23)  HR: 73 (:23) (71 - 78)  BP: 139/52 (2020 06:23) (130/62 - 152/52)  BP(mean): --  ABP: --  ABP(mean): --  RR: 15 (2020 06:23) (15 - 20)  SpO2: 100% (:23) (96% - 100%)        CAPILLARY BLOOD GLUCOSE      POCT Blood Glucose.: 306 mg/dL (15 Feb 2020 22:46)      PHYSICAL EXAM:  	HEENT: NCAT.  PERRL.  EOMI.  No scleral icterus or injection.  Moist MM.     	Neck: Supple.  Full ROM.  No JVD.   	Heart: RRR.  Normal S1 and S2.  No murmurs   	Lungs: CTAB. No wheezes   	Abdomen: BS+, soft, NT/ND.    	Skin: Warm and dry.  No rashes.  	Extremities: No edema, clubbing, or cyanosis.  2+ peripheral pulses b/l.  	Musculoskeletal: No spinal or paraspinal tenderness.  Neuro: A&Ox2 (to self, knows she is at a hospital),  4+/5 strength in UE and LE b/l, sensory grossly intact in all 4 extremities        HOSPITAL MEDICATIONS:  enoxaparin Injectable 40 milliGRAM(s) SubCutaneous daily  colchicine 0.6 milliGRAM(s) Oral daily  dextrose 40% Gel 15 Gram(s) Oral once PRN  dextrose 50% Injectable 12.5 Gram(s) IV Push once  dextrose 50% Injectable 25 Gram(s) IV Push once  dextrose 50% Injectable 25 Gram(s) IV Push once  glucagon  Injectable 1 milliGRAM(s) IntraMuscular once PRN  insulin lispro (HumaLOG) corrective regimen sliding scale   SubCutaneous three times a day before meals  insulin lispro (HumaLOG) corrective regimen sliding scale   SubCutaneous at bedtime  levothyroxine 75 MICROGram(s) Oral daily  donepezil 10 milliGRAM(s) Oral daily  escitalopram 20 milliGRAM(s) Oral daily  risperiDONE   Tablet 0.5 milliGRAM(s) Oral at bedtime  dextrose 5%. 1000 milliLiter(s) IV Continuous <Continuous>  sodium chloride 0.9%. 1000 milliLiter(s) IV Continuous <Continuous>            LABS:                        12.2   15.75 )-----------( 251      ( 15 Feb 2020 17:47 )             36.9     Hgb Trend: 12.2<--  02-15    129<L>  |  91<L>  |  19  ----------------------------<  337<H>  4.4   |  26  |  1.15    Ca    9.6      15 Feb 2020 17:47  Phos  2.6     02-15  Mg     1.7     02-15    TPro  7.5  /  Alb  3.7  /  TBili  0.4  /  DBili  x   /  AST  13  /  ALT  15  /  AlkPhos  71  02-15    Creatinine Trend: 1.15<--    Urinalysis Basic - ( 15 Feb 2020 18:20 )    Color: YELLOW / Appearance: Lt TURBID / S.025 / pH: 5.5  Gluc: 1000 / Ketone: NEGATIVE  / Bili: NEGATIVE / Urobili: NORMAL   Blood: NEGATIVE / Protein: 50 / Nitrite: NEGATIVE   Leuk Esterase: TRACE / RBC: 3-5 / WBC 6-10   Sq Epi: FEW / Non Sq Epi: x / Bacteria: NEGATIVE        Venous Blood Gas:  02-15 @ 17:47  7.37/51/28/26/47.5  VBG Lactate: 1.8 Interval Events: No acute overnight events. Pt without complaints this morning. Denies cp/f/c/abdominal pain/dysuria/sob.      OBJECTIVE:  ICU Vital Signs Last 24 Hrs  T(C): 36.7 (2020 06:23), Max: 36.7 (2020 06:23)  T(F): 98.1 (2020 06:23), Max: 98.1 (2020 06:23)  HR: 73 (:23) (71 - 78)  BP: 139/52 (2020 06:23) (130/62 - 152/52)  BP(mean): --  ABP: --  ABP(mean): --  RR: 15 (2020 06:23) (15 - 20)  SpO2: 100% (:23) (96% - 100%)        CAPILLARY BLOOD GLUCOSE      POCT Blood Glucose.: 306 mg/dL (15 Feb 2020 22:46)      PHYSICAL EXAM:  	HEENT: NCAT.  PERRL.  EOMI.  No scleral icterus or injection.  Moist MM.     	Neck: Supple.  Full ROM.  No JVD.   	Heart: RRR.  Normal S1 and S2.  No murmurs   	Lungs: CTAB. No wheezes   	Abdomen: BS+, soft, NT/ND.    	Skin: Warm and dry.  No rashes.  	Extremities: No edema, clubbing, or cyanosis.  2+ peripheral pulses b/l.  	Musculoskeletal: No spinal or paraspinal tenderness.  Neuro: A&Ox2 (to self, knows she is at a hospital), no focal neuro deficits        HOSPITAL MEDICATIONS:  enoxaparin Injectable 40 milliGRAM(s) SubCutaneous daily  colchicine 0.6 milliGRAM(s) Oral daily  dextrose 40% Gel 15 Gram(s) Oral once PRN  dextrose 50% Injectable 12.5 Gram(s) IV Push once  dextrose 50% Injectable 25 Gram(s) IV Push once  dextrose 50% Injectable 25 Gram(s) IV Push once  glucagon  Injectable 1 milliGRAM(s) IntraMuscular once PRN  insulin lispro (HumaLOG) corrective regimen sliding scale   SubCutaneous three times a day before meals  insulin lispro (HumaLOG) corrective regimen sliding scale   SubCutaneous at bedtime  levothyroxine 75 MICROGram(s) Oral daily  donepezil 10 milliGRAM(s) Oral daily  escitalopram 20 milliGRAM(s) Oral daily  risperiDONE   Tablet 0.5 milliGRAM(s) Oral at bedtime  dextrose 5%. 1000 milliLiter(s) IV Continuous <Continuous>  sodium chloride 0.9%. 1000 milliLiter(s) IV Continuous <Continuous>            LABS:                        12.2   15.75 )-----------( 251      ( 15 Feb 2020 17:47 )             36.9     Hgb Trend: 12.2<--  02-15    129<L>  |  91<L>  |  19  ----------------------------<  337<H>  4.4   |  26  |  1.15    Ca    9.6      15 Feb 2020 17:47  Phos  2.6     02-15  Mg     1.7     02-15    TPro  7.5  /  Alb  3.7  /  TBili  0.4  /  DBili  x   /  AST  13  /  ALT  15  /  AlkPhos  71  0215    Creatinine Trend: 1.15<--    Urinalysis Basic - ( 15 Feb 2020 18:20 )    Color: YELLOW / Appearance: Lt TURBID / S.025 / pH: 5.5  Gluc: 1000 / Ketone: NEGATIVE  / Bili: NEGATIVE / Urobili: NORMAL   Blood: NEGATIVE / Protein: 50 / Nitrite: NEGATIVE   Leuk Esterase: TRACE / RBC: 3-5 / WBC 6-10   Sq Epi: FEW / Non Sq Epi: x / Bacteria: NEGATIVE        Venous Blood Gas:  02-15 @ 17:47  7.37/51/28/26/47.5  VBG Lactate: 1.8

## 2020-02-16 NOTE — DIETITIAN INITIAL EVALUATION ADULT. - OTHER INFO
Patient is a 73 y/o female with a past medical Hx inclusive of Alzheimer's disease, HTN, Hypothyroidism, Depression, gout, brought in by family member for increased inability to take care of herself. Per H&P, patient is at her baseline mental status, A&O x 2 (to self and location), however, she became increasingly more confused and needed more assistance with ADLS and IADS. Pt lives alone in a house, has a home health aide 3 days per week.    RD met with patient today. No family present at RD's visit. Patient was able to answer to RD's questions. Per H&P, patient has good appetite. Per patient, her appetite has been good prior to admission and current appetite remains good at this time with PO ~75 at breakfast this AM per PCA. Patient denies chewing/swallowing problems at meals. Also denies GI distress (nausea/vomiting/diarrhea/constipation). Patient is allergic to eggs. No other food allergies reported. No food preferences at this time. Patient endorsed she has never tried oral nutrition supplement before but is willing to try it if needed. Noted HgbA1C 10.5% on 2/10/20, per H&P patient on metformin (home meds), currently covered with Consistent Carbohydrate diet and SSI rx. RD provided patient with verbal DM diet education, including high carbohydrate food list and HbA1c goal. RD will reinforce DM diet education with patient and provide diet education to family when they are available.   Wt hx: (12/11/18) 79.8kg, (2/15/20) 85.5kg -- wt gain of 5.7kg/7% over past 1+ year, possibly related to good po intake.

## 2020-02-16 NOTE — PROGRESS NOTE ADULT - PROBLEM SELECTOR PLAN 2
- continue home donepezil and memantine  - follows Dr. Michelle: per last note in Jan 2020, mini mental status score 17-19,  has increased difficulty with ADLS and IADS, appears that pt needs constant prompting and supervision

## 2020-02-16 NOTE — PROGRESS NOTE ADULT - PROBLEM SELECTOR PLAN 9
Transitions of Care Status:  1.  Name of PCP: Dr. Gray   2.  PCP Contacted on Admission: [ ] Y    [x ] N  - Admitted at night  3.  PCP contacted at Discharge: [ ] Y    [ ] N    [ ] N/A  4.  Post-Discharge Appointment Date and Location:  5.  Summary of Handoff given to PCP: DVT prophylaxis: Lovenox   Diet: DM diet

## 2020-02-16 NOTE — DIETITIAN INITIAL EVALUATION ADULT. - PERTINENT MEDS FT
MEDICATIONS  (STANDING):  colchicine 0.6 milliGRAM(s) Oral daily  dextrose 5%. 1000 milliLiter(s) (50 mL/Hr) IV Continuous <Continuous>  dextrose 50% Injectable 12.5 Gram(s) IV Push once  dextrose 50% Injectable 25 Gram(s) IV Push once  dextrose 50% Injectable 25 Gram(s) IV Push once  donepezil 10 milliGRAM(s) Oral daily  enoxaparin Injectable 40 milliGRAM(s) SubCutaneous daily  escitalopram 20 milliGRAM(s) Oral daily  insulin glargine Injectable (LANTUS) 10 Unit(s) SubCutaneous at bedtime  insulin lispro (HumaLOG) corrective regimen sliding scale   SubCutaneous three times a day before meals  insulin lispro Injectable (HumaLOG) 2 Unit(s) SubCutaneous three times a day before meals  levothyroxine 75 MICROGram(s) Oral daily  risperiDONE   Tablet 0.5 milliGRAM(s) Oral at bedtime  sodium chloride 0.9%. 1000 milliLiter(s) (100 mL/Hr) IV Continuous <Continuous>    MEDICATIONS  (PRN):  dextrose 40% Gel 15 Gram(s) Oral once PRN Blood Glucose LESS THAN 70 milliGRAM(s)/deciliter  glucagon  Injectable 1 milliGRAM(s) IntraMuscular once PRN Glucose LESS THAN 70 milligrams/deciliter

## 2020-02-17 DIAGNOSIS — E11.65 TYPE 2 DIABETES MELLITUS WITH HYPERGLYCEMIA: ICD-10-CM

## 2020-02-17 DIAGNOSIS — I10 ESSENTIAL (PRIMARY) HYPERTENSION: ICD-10-CM

## 2020-02-17 DIAGNOSIS — E78.5 HYPERLIPIDEMIA, UNSPECIFIED: ICD-10-CM

## 2020-02-17 LAB
ANION GAP SERPL CALC-SCNC: 14 MMO/L — SIGNIFICANT CHANGE UP (ref 7–14)
BACTERIA UR CULT: SIGNIFICANT CHANGE UP
BASOPHILS # BLD AUTO: 0.04 K/UL — SIGNIFICANT CHANGE UP (ref 0–0.2)
BASOPHILS NFR BLD AUTO: 0.3 % — SIGNIFICANT CHANGE UP (ref 0–2)
BUN SERPL-MCNC: 14 MG/DL — SIGNIFICANT CHANGE UP (ref 7–23)
CALCIUM SERPL-MCNC: 9.5 MG/DL — SIGNIFICANT CHANGE UP (ref 8.4–10.5)
CHLORIDE SERPL-SCNC: 95 MMOL/L — LOW (ref 98–107)
CO2 SERPL-SCNC: 25 MMOL/L — SIGNIFICANT CHANGE UP (ref 22–31)
CREAT SERPL-MCNC: 0.9 MG/DL — SIGNIFICANT CHANGE UP (ref 0.5–1.3)
EOSINOPHIL # BLD AUTO: 0.1 K/UL — SIGNIFICANT CHANGE UP (ref 0–0.5)
EOSINOPHIL NFR BLD AUTO: 0.9 % — SIGNIFICANT CHANGE UP (ref 0–6)
GLUCOSE BLDC GLUCOMTR-MCNC: 224 MG/DL — HIGH (ref 70–99)
GLUCOSE BLDC GLUCOMTR-MCNC: 241 MG/DL — HIGH (ref 70–99)
GLUCOSE BLDC GLUCOMTR-MCNC: 259 MG/DL — HIGH (ref 70–99)
GLUCOSE BLDC GLUCOMTR-MCNC: 270 MG/DL — HIGH (ref 70–99)
GLUCOSE SERPL-MCNC: 224 MG/DL — HIGH (ref 70–99)
HCT VFR BLD CALC: 36.7 % — SIGNIFICANT CHANGE UP (ref 34.5–45)
HCT VFR BLD CALC: 36.7 % — SIGNIFICANT CHANGE UP (ref 34.5–45)
HGB BLD-MCNC: 12.5 G/DL — SIGNIFICANT CHANGE UP (ref 11.5–15.5)
HGB BLD-MCNC: 12.5 G/DL — SIGNIFICANT CHANGE UP (ref 11.5–15.5)
IMM GRANULOCYTES NFR BLD AUTO: 0.3 % — SIGNIFICANT CHANGE UP (ref 0–1.5)
LYMPHOCYTES # BLD AUTO: 2.35 K/UL — SIGNIFICANT CHANGE UP (ref 1–3.3)
LYMPHOCYTES # BLD AUTO: 20.2 % — SIGNIFICANT CHANGE UP (ref 13–44)
MAGNESIUM SERPL-MCNC: 1.7 MG/DL — SIGNIFICANT CHANGE UP (ref 1.6–2.6)
MCHC RBC-ENTMCNC: 31.3 PG — SIGNIFICANT CHANGE UP (ref 27–34)
MCHC RBC-ENTMCNC: 31.3 PG — SIGNIFICANT CHANGE UP (ref 27–34)
MCHC RBC-ENTMCNC: 34.1 % — SIGNIFICANT CHANGE UP (ref 32–36)
MCHC RBC-ENTMCNC: 34.1 % — SIGNIFICANT CHANGE UP (ref 32–36)
MCV RBC AUTO: 91.8 FL — SIGNIFICANT CHANGE UP (ref 80–100)
MCV RBC AUTO: 91.8 FL — SIGNIFICANT CHANGE UP (ref 80–100)
MONOCYTES # BLD AUTO: 1.22 K/UL — HIGH (ref 0–0.9)
MONOCYTES NFR BLD AUTO: 10.5 % — SIGNIFICANT CHANGE UP (ref 2–14)
NEUTROPHILS # BLD AUTO: 7.91 K/UL — HIGH (ref 1.8–7.4)
NEUTROPHILS NFR BLD AUTO: 67.8 % — SIGNIFICANT CHANGE UP (ref 43–77)
NRBC # FLD: 0 K/UL — SIGNIFICANT CHANGE UP (ref 0–0)
NRBC # FLD: 0 K/UL — SIGNIFICANT CHANGE UP (ref 0–0)
PHOSPHATE SERPL-MCNC: 2.9 MG/DL — SIGNIFICANT CHANGE UP (ref 2.5–4.5)
PLATELET # BLD AUTO: 344 K/UL — SIGNIFICANT CHANGE UP (ref 150–400)
PLATELET # BLD AUTO: 344 K/UL — SIGNIFICANT CHANGE UP (ref 150–400)
PMV BLD: 9.7 FL — SIGNIFICANT CHANGE UP (ref 7–13)
PMV BLD: 9.7 FL — SIGNIFICANT CHANGE UP (ref 7–13)
POTASSIUM SERPL-MCNC: 4.1 MMOL/L — SIGNIFICANT CHANGE UP (ref 3.5–5.3)
POTASSIUM SERPL-SCNC: 4.1 MMOL/L — SIGNIFICANT CHANGE UP (ref 3.5–5.3)
RBC # BLD: 4 M/UL — SIGNIFICANT CHANGE UP (ref 3.8–5.2)
RBC # BLD: 4 M/UL — SIGNIFICANT CHANGE UP (ref 3.8–5.2)
RBC # FLD: 12.5 % — SIGNIFICANT CHANGE UP (ref 10.3–14.5)
RBC # FLD: 12.5 % — SIGNIFICANT CHANGE UP (ref 10.3–14.5)
SODIUM SERPL-SCNC: 134 MMOL/L — LOW (ref 135–145)
SPECIMEN SOURCE: SIGNIFICANT CHANGE UP
SPECIMEN SOURCE: SIGNIFICANT CHANGE UP
WBC # BLD: 11.85 K/UL — HIGH (ref 3.8–10.5)
WBC # BLD: 11.85 K/UL — HIGH (ref 3.8–10.5)
WBC # FLD AUTO: 11.85 K/UL — HIGH (ref 3.8–10.5)
WBC # FLD AUTO: 11.85 K/UL — HIGH (ref 3.8–10.5)

## 2020-02-17 PROCEDURE — 99223 1ST HOSP IP/OBS HIGH 75: CPT | Mod: GC

## 2020-02-17 PROCEDURE — 99233 SBSQ HOSP IP/OBS HIGH 50: CPT | Mod: GC

## 2020-02-17 RX ORDER — INSULIN LISPRO 100/ML
4 VIAL (ML) SUBCUTANEOUS
Refills: 0 | Status: DISCONTINUED | OUTPATIENT
Start: 2020-02-17 | End: 2020-02-17

## 2020-02-17 RX ORDER — INSULIN LISPRO 100/ML
VIAL (ML) SUBCUTANEOUS
Refills: 0 | Status: DISCONTINUED | OUTPATIENT
Start: 2020-02-17 | End: 2020-02-21

## 2020-02-17 RX ORDER — INSULIN GLARGINE 100 [IU]/ML
16 INJECTION, SOLUTION SUBCUTANEOUS AT BEDTIME
Refills: 0 | Status: DISCONTINUED | OUTPATIENT
Start: 2020-02-17 | End: 2020-02-18

## 2020-02-17 RX ORDER — INSULIN LISPRO 100/ML
5 VIAL (ML) SUBCUTANEOUS
Refills: 0 | Status: DISCONTINUED | OUTPATIENT
Start: 2020-02-17 | End: 2020-02-18

## 2020-02-17 RX ORDER — INSULIN GLARGINE 100 [IU]/ML
14 INJECTION, SOLUTION SUBCUTANEOUS AT BEDTIME
Refills: 0 | Status: DISCONTINUED | OUTPATIENT
Start: 2020-02-17 | End: 2020-02-17

## 2020-02-17 RX ORDER — INSULIN LISPRO 100/ML
VIAL (ML) SUBCUTANEOUS AT BEDTIME
Refills: 0 | Status: DISCONTINUED | OUTPATIENT
Start: 2020-02-17 | End: 2020-02-21

## 2020-02-17 RX ADMIN — RISPERIDONE 0.5 MILLIGRAM(S): 4 TABLET ORAL at 21:47

## 2020-02-17 RX ADMIN — Medication 5 UNIT(S): at 18:31

## 2020-02-17 RX ADMIN — Medication 6: at 09:34

## 2020-02-17 RX ADMIN — Medication 0.6 MILLIGRAM(S): at 12:30

## 2020-02-17 RX ADMIN — Medication 2: at 18:30

## 2020-02-17 RX ADMIN — ENOXAPARIN SODIUM 40 MILLIGRAM(S): 100 INJECTION SUBCUTANEOUS at 12:30

## 2020-02-17 RX ADMIN — Medication 75 MICROGRAM(S): at 06:14

## 2020-02-17 RX ADMIN — ESCITALOPRAM OXALATE 20 MILLIGRAM(S): 10 TABLET, FILM COATED ORAL at 12:30

## 2020-02-17 RX ADMIN — DONEPEZIL HYDROCHLORIDE 10 MILLIGRAM(S): 10 TABLET, FILM COATED ORAL at 12:30

## 2020-02-17 RX ADMIN — INSULIN GLARGINE 16 UNIT(S): 100 INJECTION, SOLUTION SUBCUTANEOUS at 22:40

## 2020-02-17 RX ADMIN — Medication 3: at 13:25

## 2020-02-17 NOTE — CONSULT NOTE ADULT - SUBJECTIVE AND OBJECTIVE BOX
HPI:  74y woman with Alzheimer's disease, HTN, Hypothyroidism, Type 2 DM (A1C 10.5), Depression, gout, brought in by family member for increased inability to take care of herself. Per pt's son (Ollie) at bedside, pt is at her baseline mental status, A&Ox 2 (to self and location), however, she became increasingly more confused and needed more assistance with ADLS and IADS.  Son endorses a gradual decline in mental status, and pt was admitted in Dec 2018 for similar complaint. Pt was found by son this morning covered in urine, pt was unable to get up from bed and walk down the stairs. Pt has unsteady gait at baseline (does not use walker or cane), but has no focal weakness. Pt lives alone in a house, has a home health aide 3 days per week, has good appetite, goes to [facility] 2x per week . Pt endorses subjective chills, mild headache, and neck soreness, denies fever, SOB, CP, n/v. No sick contact at home, no recent travel. no recent fall.    Endocrine History:  Patient poor historian. Says she wants to sleep, does not want to talk.   Does admit to hx of DM2 for a few years. Does not know what meds she takes. Does not take insulin. On metformin 500mg q daily as per med rec. Does not have glucometer. Reports decreased appetite. Lives alone and manages her own meds.   Also with hx of hypothyroidism, on levothyroxine 75mcg daily. Denies hx of neck surgery or RT.      PAST MEDICAL & SURGICAL HISTORY:  HLD (hyperlipidemia)  HTN (hypertension)  Arthritis  Alzheimer disease  Hypothyroidism  History of appendectomy      FAMILY HISTORY:  Family history of coronary artery disease in father  Family history of diabetes mellitus in father  Family history of dementia (Sibling)      Social History: Denies smoking, alcohol use    Outpatient Medications:  · 	donepezil 10 mg oral tablet: Last Dose Taken:  , 1 tab(s) orally once a day  · 	Colcrys 0.6 mg oral tablet: Last Dose Taken:  , 1 tab(s) orally once a day  · 	escitalopram 20 mg oral tablet: Last Dose Taken:  , 1 tab(s) orally once a day  · 	risperiDONE 0.5 mg oral tablet: Last Dose Taken:  , 1 tab(s) orally once a day (in the evening)  · 	memantine 7 mg oral capsule, extended release: Last Dose Taken:  , 1 cap(s) orally once a day  · 	levothyroxine 75 mcg (0.075 mg) oral tablet: Last Dose Taken:  , 1 tab(s) orally once a day  · 	metFORMIN 500 mg oral tablet, extended release: Last Dose Taken:  , 1 tab(s) orally once a day      MEDICATIONS  (STANDING):  colchicine 0.6 milliGRAM(s) Oral daily  dextrose 5%. 1000 milliLiter(s) (50 mL/Hr) IV Continuous <Continuous>  dextrose 50% Injectable 12.5 Gram(s) IV Push once  dextrose 50% Injectable 25 Gram(s) IV Push once  dextrose 50% Injectable 25 Gram(s) IV Push once  donepezil 10 milliGRAM(s) Oral daily  enoxaparin Injectable 40 milliGRAM(s) SubCutaneous daily  escitalopram 20 milliGRAM(s) Oral daily  insulin glargine Injectable (LANTUS) 14 Unit(s) SubCutaneous at bedtime  insulin lispro (HumaLOG) corrective regimen sliding scale   SubCutaneous three times a day before meals  insulin lispro Injectable (HumaLOG) 4 Unit(s) SubCutaneous three times a day before meals  levothyroxine 75 MICROGram(s) Oral daily  risperiDONE   Tablet 0.5 milliGRAM(s) Oral at bedtime  sodium chloride 0.9%. 1000 milliLiter(s) (100 mL/Hr) IV Continuous <Continuous>    MEDICATIONS  (PRN):  dextrose 40% Gel 15 Gram(s) Oral once PRN Blood Glucose LESS THAN 70 milliGRAM(s)/deciliter  glucagon  Injectable 1 milliGRAM(s) IntraMuscular once PRN Glucose LESS THAN 70 milligrams/deciliter      Allergies    eggs (Unknown)  penicillins (Unknown)    Intolerances      Review of Systems:  Unable to obtain ROS  not cooperative with interview    PHYSICAL EXAM:  VITALS: T(C): 36.3 (02-17-20 @ 06:12)  T(F): 97.3 (02-17-20 @ 06:12), Max: 98.4 (02-16-20 @ 15:40)  HR: 72 (02-17-20 @ 06:12) (72 - 92)  BP: 146/54 (02-17-20 @ 06:12) (146/54 - 148/74)  RR:  (16 - 17)  SpO2:  (95% - 98%)  Wt(kg): --   GENERAL: NAD, well-groomed, well-developed  EYES: No proptosis, no lid lag, anicteric  HEENT:  Atraumatic, Normocephalic, moist mucous membranes  THYROID: Normal size, no palpable nodules  RESPIRATORY: Clear to auscultation bilaterally; No rales, rhonchi, wheezing  CARDIOVASCULAR: Regular rate and rhythm; No murmurs; no peripheral edema  GI: Soft, nontender, non distended, normal bowel sounds  MUSCULOSKELETAL: generalized weakness  NEURO: sensation intact, extraocular movements intact  PSYCH: Alert and oriented x 2, flat affect      POCT Blood Glucose.: 259 mg/dL (02-17-20 @ 09:04)  POCT Blood Glucose.: 277 mg/dL (02-16-20 @ 22:14)  POCT Blood Glucose.: 252 mg/dL (02-16-20 @ 18:06)  POCT Blood Glucose.: 239 mg/dL (02-16-20 @ 12:45)  POCT Blood Glucose.: 222 mg/dL (02-16-20 @ 09:08)  POCT Blood Glucose.: 306 mg/dL (02-15-20 @ 22:46)                            12.5   13.79 )-----------( 159      ( 16 Feb 2020 14:32 )             36.9       02-16    130<L>  |  94<L>  |  14  ----------------------------<  302<H>  4.0   |  19<L>  |  0.92    EGFR if : 70  EGFR if non : 60    Ca    9.2      02-16  Mg     1.7     02-16  Phos  2.7     02-16    TPro  7.3  /  Alb  3.2<L>  /  TBili  0.4  /  DBili  x   /  AST  27  /  ALT  13  /  AlkPhos  77  02-16      Thyroid Function Tests:  02-15 @ 17:47 TSH 3.61 FreeT4 -- T3 -- Anti TPO -- Anti Thyroglobulin Ab -- TSI --      Hemoglobin A1C, Whole Blood: 10.5 % <H> [4.0 - 5.6] (02-16-20 @ 07:00)            Radiology: HPI:  74y woman with Alzheimer's disease, HTN, Hypothyroidism, Type 2 DM (A1C 10.5), Depression, gout, brought in by family member for increased inability to take care of herself. Per pt's son (Ollie) at bedside, pt is at her baseline mental status, A&Ox 2 (to self and location), however, she became increasingly more confused and needed more assistance with ADLS and IADS.  Son endorses a gradual decline in mental status, and pt was admitted in Dec 2018 for similar complaint. Pt was found by son this morning covered in urine, pt was unable to get up from bed and walk down the stairs. Pt has unsteady gait at baseline (does not use walker or cane), but has no focal weakness. Pt lives alone in a house, has a home health aide 3 days per week, has good appetite, goes to [facility] 2x per week . Pt endorses subjective chills, mild headache, and neck soreness, denies fever, SOB, CP, n/v. No sick contact at home, no recent travel. no recent fall.    Endocrine History:  Patient poor historian. Says she wants to sleep, does not want to talk.   Does admit to hx of DM2 for a few years. Does not know what meds she takes. Does not take insulin. On metformin 500mg q daily as per med rec. Does not have glucometer. Reports decreased appetite. Lives alone and manages her own meds.   Also with hx of hypothyroidism, on levothyroxine 75mcg daily. Denies hx of neck surgery or RT.      PAST MEDICAL & SURGICAL HISTORY:  HLD (hyperlipidemia)  HTN (hypertension)  Arthritis  Alzheimer disease  Hypothyroidism  History of appendectomy      FAMILY HISTORY:  Family history of coronary artery disease in father  Family history of diabetes mellitus in father  Family history of dementia (Sibling)      Social History: Denies smoking, alcohol use    Outpatient Medications:  · 	donepezil 10 mg oral tablet: Last Dose Taken:  , 1 tab(s) orally once a day  · 	Colcrys 0.6 mg oral tablet: Last Dose Taken:  , 1 tab(s) orally once a day  · 	escitalopram 20 mg oral tablet: Last Dose Taken:  , 1 tab(s) orally once a day  · 	risperiDONE 0.5 mg oral tablet: Last Dose Taken:  , 1 tab(s) orally once a day (in the evening)  · 	memantine 7 mg oral capsule, extended release: Last Dose Taken:  , 1 cap(s) orally once a day  · 	levothyroxine 75 mcg (0.075 mg) oral tablet: Last Dose Taken:  , 1 tab(s) orally once a day  · 	metFORMIN 500 mg oral tablet, extended release: Last Dose Taken:  , 1 tab(s) orally once a day      MEDICATIONS  (STANDING):  colchicine 0.6 milliGRAM(s) Oral daily  dextrose 5%. 1000 milliLiter(s) (50 mL/Hr) IV Continuous <Continuous>  dextrose 50% Injectable 12.5 Gram(s) IV Push once  dextrose 50% Injectable 25 Gram(s) IV Push once  dextrose 50% Injectable 25 Gram(s) IV Push once  donepezil 10 milliGRAM(s) Oral daily  enoxaparin Injectable 40 milliGRAM(s) SubCutaneous daily  escitalopram 20 milliGRAM(s) Oral daily  insulin glargine Injectable (LANTUS) 14 Unit(s) SubCutaneous at bedtime  insulin lispro (HumaLOG) corrective regimen sliding scale   SubCutaneous three times a day before meals  insulin lispro Injectable (HumaLOG) 4 Unit(s) SubCutaneous three times a day before meals  levothyroxine 75 MICROGram(s) Oral daily  risperiDONE   Tablet 0.5 milliGRAM(s) Oral at bedtime  sodium chloride 0.9%. 1000 milliLiter(s) (100 mL/Hr) IV Continuous <Continuous>    MEDICATIONS  (PRN):  dextrose 40% Gel 15 Gram(s) Oral once PRN Blood Glucose LESS THAN 70 milliGRAM(s)/deciliter  glucagon  Injectable 1 milliGRAM(s) IntraMuscular once PRN Glucose LESS THAN 70 milligrams/deciliter      Allergies    eggs (Unknown)  penicillins (Unknown)    Intolerances      Review of Systems:  Unable to obtain ROS  not cooperative with interview    PHYSICAL EXAM:  VITALS: T(C): 36.3 (02-17-20 @ 06:12)  T(F): 97.3 (02-17-20 @ 06:12), Max: 98.4 (02-16-20 @ 15:40)  HR: 72 (02-17-20 @ 06:12) (72 - 92)  BP: 146/54 (02-17-20 @ 06:12) (146/54 - 148/74)  RR:  (16 - 17)  SpO2:  (95% - 98%)  Wt(kg): --   GENERAL: NAD, well-groomed, well-developed  EYES: No proptosis, no lid lag, anicteric  HEENT:  Atraumatic, Normocephalic, moist mucous membranes  THYROID: Normal size, no palpable nodules  RESPIRATORY: Clear to auscultation bilaterally; No rales, rhonchi, wheezing  CARDIOVASCULAR: Regular rate and rhythm; No murmurs; no peripheral edema  GI: Soft, nontender, non distended, normal bowel sounds  MUSCULOSKELETAL: generalized weakness  NEURO: sensation intact, extraocular movements intact  PSYCH: Alert and oriented x 2, flat affect      POCT Blood Glucose.: 259 mg/dL (02-17-20 @ 09:04)  POCT Blood Glucose.: 277 mg/dL (02-16-20 @ 22:14)  POCT Blood Glucose.: 252 mg/dL (02-16-20 @ 18:06)  POCT Blood Glucose.: 239 mg/dL (02-16-20 @ 12:45)  POCT Blood Glucose.: 222 mg/dL (02-16-20 @ 09:08)  POCT Blood Glucose.: 306 mg/dL (02-15-20 @ 22:46)                            12.5   13.79 )-----------( 159      ( 16 Feb 2020 14:32 )             36.9       02-16    130<L>  |  94<L>  |  14  ----------------------------<  302<H>  4.0   |  19<L>  |  0.92    EGFR if : 70  EGFR if non : 60    Ca    9.2      02-16  Mg     1.7     02-16  Phos  2.7     02-16    TPro  7.3  /  Alb  3.2<L>  /  TBili  0.4  /  DBili  x   /  AST  27  /  ALT  13  /  AlkPhos  77  02-16      Thyroid Function Tests:  02-15 @ 17:47 TSH 3.61 FreeT4 -- T3 -- Anti TPO -- Anti Thyroglobulin Ab -- TSI --      Hemoglobin A1C, Whole Blood: 10.5 % <H> [4.0 - 5.6] (02-16-20 @ 07:00)

## 2020-02-17 NOTE — CHART NOTE - NSCHARTNOTEFT_GEN_A_CORE
Spoke to son/HCP Ollie at bedside and relayed updated medical plan. Son expressed concern that pt cannot live alone; she gets [facility]HA services 3d/wk 4hrs/day but is concerned that during the hours she is alone, she is unable to dress/bathe/take care of herself. Is also concerned that she is overeating, noting that she does not remember going through food but will "binge" eat often. States that pt has not fallen but is concerned for falls given that she lives in a 2story house and her general condition has been worsening. She was in a rehab center in December and he would prefer that she go there as she improved while there. They are looking to find a long-term care facility as well.  Will reach out to CM to see if son can be given a list of choices for rehab.

## 2020-02-17 NOTE — CONSULT NOTE ADULT - ATTENDING COMMENTS
Agree w/ fellows A&P as above. Will increase insulin doses 2/2 hyperglycemia. D/C regimen TBD partly based on whether she will be going home or to a care facility that can administer insulin.

## 2020-02-17 NOTE — PROGRESS NOTE ADULT - PROBLEM SELECTOR PLAN 1
- presented with leukocytosis (15K), no bands, no clear source of infection  - CXR: clear lungs  - UA bland, from clean catch, no UTI symptoms, f/u UCx, treat if positive  - TSH wnl  - per family, pt is at baseline mental status, no recent falls, no need for CT head   - PT consulted, fall precautions  - Nutritional consult in AM  - son prefers assisted living for upon discharge - p/w leukocytosis (15K), no bands, no clear source of infection  - CXR: clear lungs  - UA bland, from clean catch, no UTI symptoms, Ucx contaminated   - TSH wnl  - per family, pt is at baseline mental status, no recent falls, no need for CT head   - PT consulted, fall precautions  - f/u Nutritional consult   - son prefers assisted living for upon discharge

## 2020-02-17 NOTE — PROGRESS NOTE ADULT - PROBLEM SELECTOR PLAN 6
NIDDM2 w/ hyperglycemia, A1c 10.5  - start lantus 10u qhs, humalog 2u tid  - increased to moderate correction ISS  - endocrine emailed f/u recs  - dietician consult placed  - on oral metformin at home NIDDM2 w/ hyperglycemia, A1c 10.5  - increased lantus from 10u --> 14u qhs, humalog 2u --> 5u tid  - increased to moderate correction ISS  - endocrine emailed f/u recs  - dietician consult placed  - on oral metformin at home NIDDM2 w/ hyperglycemia, A1c 10.5  - increased lantus from 10u --> 16u qhs, humalog 2u --> 5u tid  - increased to moderate correction ISS  - endocrine emailed f/u recs  - dietician consult placed  - on oral metformin at home

## 2020-02-17 NOTE — PROGRESS NOTE ADULT - ATTENDING COMMENTS
Patient was seen and examined personally by me. I have discussed the plan and reviewed the resident's note and agree with the above physical exam findings including assessment and plan except as indicated below.    Leukocytosis downtrending without intervention. F/u Bcx given bandemia on admission. Monitor off Abx. No other sources for infection found.  A1C: 10.5 only on metformin at home. Increase Lantus 16 U and humalog 5 U premeal TID per endo recs  PT recs: rehab

## 2020-02-17 NOTE — PROGRESS NOTE ADULT - SUBJECTIVE AND OBJECTIVE BOX
***************************************************************  Trinhlizett Zapata, PGY1  Internal Medicine   pager: [phone] / [phone]  ***************************************************************    PROGRESS NOTE:     Patient is a 74y old  Female who presents with a chief complaint of Failure to thrive (2020 17:52)      SUBJECTIVE / OVERNIGHT EVENTS: No acute overnight events. Pt without complaints this morning. AOx2 (to name and location)     ADDITIONAL REVIEW OF SYSTEMS: Denies cp/f/c/abdominal pain/dysuria/sob.    MEDICATIONS  (STANDING):  colchicine 0.6 milliGRAM(s) Oral daily  dextrose 5%. 1000 milliLiter(s) (50 mL/Hr) IV Continuous <Continuous>  dextrose 50% Injectable 12.5 Gram(s) IV Push once  dextrose 50% Injectable 25 Gram(s) IV Push once  dextrose 50% Injectable 25 Gram(s) IV Push once  donepezil 10 milliGRAM(s) Oral daily  enoxaparin Injectable 40 milliGRAM(s) SubCutaneous daily  escitalopram 20 milliGRAM(s) Oral daily  insulin glargine Injectable (LANTUS) 10 Unit(s) SubCutaneous at bedtime  insulin lispro (HumaLOG) corrective regimen sliding scale   SubCutaneous three times a day before meals  insulin lispro Injectable (HumaLOG) 2 Unit(s) SubCutaneous three times a day before meals  levothyroxine 75 MICROGram(s) Oral daily  risperiDONE   Tablet 0.5 milliGRAM(s) Oral at bedtime  sodium chloride 0.9%. 1000 milliLiter(s) (100 mL/Hr) IV Continuous <Continuous>    MEDICATIONS  (PRN):  dextrose 40% Gel 15 Gram(s) Oral once PRN Blood Glucose LESS THAN 70 milliGRAM(s)/deciliter  glucagon  Injectable 1 milliGRAM(s) IntraMuscular once PRN Glucose LESS THAN 70 milligrams/deciliter      CAPILLARY BLOOD GLUCOSE      POCT Blood Glucose.: 277 mg/dL (2020 22:14)  POCT Blood Glucose.: 252 mg/dL (2020 18:06)  POCT Blood Glucose.: 239 mg/dL (2020 12:45)  POCT Blood Glucose.: 222 mg/dL (2020 09:08)    I&O's Summary    2020 07:01  -  2020 07:00  --------------------------------------------------------  IN: 300 mL / OUT: 0 mL / NET: 300 mL        PHYSICAL EXAM:  Vital Signs Last 24 Hrs  T(C): 36.3 (2020 06:12), Max: 36.9 (2020 15:40)  T(F): 97.3 (2020 06:12), Max: 98.4 (2020 15:40)  HR: 72 (2020 06:12) (72 - 92)  BP: 146/54 (2020 06:12) (146/54 - 148/74)  BP(mean): --  RR: 16 (2020 06:12) (16 - 17)  SpO2: 95% (2020 06:12) (95% - 98%)    PHYSICAL EXAM:  HEENT: NCAT.  PERRL.  EOMI.  No scleral icterus or injection.  Moist MM.     Neck: Supple.  Full ROM.  No JVD.   Heart: RRR.  Normal S1 and S2.  No murmurs   Lungs: CTAB. No wheezes   Abdomen: BS+, soft, NT/ND.    Skin: Warm and dry.  No rashes.  Extremities: No edema, clubbing, or cyanosis.  2+ peripheral pulses b/l.  Musculoskeletal: No spinal or paraspinal tenderness.  Neuro: A&Ox2 (to self, knows she is at a hospital), no focal neuro deficits    LABS:                        12.5   13.79 )-----------( 159      ( 2020 14:32 )             36.9     02-16    130<L>  |  94<L>  |  14  ----------------------------<  302<H>  4.0   |  19<L>  |  0.92    Ca    9.2      2020 14:49  Phos  2.7     02-  Mg     1.7         TPro  7.3  /  Alb  3.2<L>  /  TBili  0.4  /  DBili  x   /  AST  27  /  ALT  13  /  AlkPhos  77  -          Urinalysis Basic - ( 15 Feb 2020 18:20 )    Color: YELLOW / Appearance: Lt TURBID / S.025 / pH: 5.5  Gluc: 1000 / Ketone: NEGATIVE  / Bili: NEGATIVE / Urobili: NORMAL   Blood: NEGATIVE / Protein: 50 / Nitrite: NEGATIVE   Leuk Esterase: TRACE / RBC: 3-5 / WBC 6-10   Sq Epi: FEW / Non Sq Epi: x / Bacteria: NEGATIVE          RADIOLOGY & ADDITIONAL TESTS:  Results Reviewed:   Imaging Personally Reviewed:  Electrocardiogram Personally Reviewed:    COORDINATION OF CARE:  Care Discussed with Consultants/Other Providers [Y/N]:  Prior or Outpatient Records Reviewed [Y/N]:

## 2020-02-17 NOTE — PROGRESS NOTE ADULT - PROBLEM SELECTOR PLAN 5
- no clear infectious etiology at this time, CXR clear, UA negative  - blood cx's ordered  - monitor off abx for now

## 2020-02-17 NOTE — CONSULT NOTE ADULT - PROBLEM SELECTOR RECOMMENDATION 9
-Patient with uncontrolled Type 2 DM (A1C 10.5). Based on pt's age and comorbidities, goal A1C 7.5-8%.  -BG in 200s, on lantus 10 an humalog 2 units TID. Can increase to lantus 16 units and humalog 5 units TID with meals. Change to low dose humalog correctional scale.  -DC plan: depending on insulin requirements, just PO meds vs basal +PO. patient will need assistance with med administration outpatient from family  -Patient can f/u with endocrine at [address] [place] [address] [phone]

## 2020-02-17 NOTE — PROGRESS NOTE ADULT - PROBLEM SELECTOR PLAN 2
- continue home donepezil and memantine  - follows Dr. Michelle: per last note in Jan 2020, mini mental status score 17-19,  has increased difficulty with ADLS and IADS, appears that pt needs constant prompting and supervision - c/w home donepezil and memantine  - follows Dr. Michelle: per last note in Jan 2020, mini mental status score 17-19,  has increased difficulty with ADLS and IADS, appears that pt needs constant prompting and supervision

## 2020-02-17 NOTE — PROGRESS NOTE ADULT - ASSESSMENT
74y woman with Alzheimer's disease, HTN, Hypothyroidism, Depression, gout, brought in by family member for increased inability to take care of herself. 74y woman with Alzheimer's disease, HTN, Hypothyroidism, Depression, gout, brought in by family member for increased inability to take care of herself.  Found to have uncontrolled diabetes with hyperglycemia and leukocytosis of unclear etiology.

## 2020-02-17 NOTE — CONSULT NOTE ADULT - ASSESSMENT
74y woman with Alzheimer's disease, HTN, Hypothyroidism, Type 2 DM (A1C 10.5), Depression, gout, brought in by family member for increased inability to take care of herself. Endocrine consulted for Type 2 DM with hyperglycemia. 74y woman with Alzheimer's disease, HTN, Hypothyroidism, Type 2 DM (A1C 10.5), Depression, gout, brought in by family member for decreased inability to take care of herself. Endocrine consulted for Type 2 DM with hyperglycemia.  This is a HIGH RISK pt. w/ HIGH LEVEL clinical decision making 2/2 her age, and severely uncontrolled DM2 which puts her at high risk for both microvascular and macrovascular complications.

## 2020-02-18 LAB
ANION GAP SERPL CALC-SCNC: 13 MMO/L — SIGNIFICANT CHANGE UP (ref 7–14)
BASOPHILS # BLD AUTO: 0.06 K/UL — SIGNIFICANT CHANGE UP (ref 0–0.2)
BASOPHILS NFR BLD AUTO: 0.6 % — SIGNIFICANT CHANGE UP (ref 0–2)
BUN SERPL-MCNC: 19 MG/DL — SIGNIFICANT CHANGE UP (ref 7–23)
CALCIUM SERPL-MCNC: 9.3 MG/DL — SIGNIFICANT CHANGE UP (ref 8.4–10.5)
CHLORIDE SERPL-SCNC: 96 MMOL/L — LOW (ref 98–107)
CO2 SERPL-SCNC: 25 MMOL/L — SIGNIFICANT CHANGE UP (ref 22–31)
CREAT SERPL-MCNC: 0.97 MG/DL — SIGNIFICANT CHANGE UP (ref 0.5–1.3)
EOSINOPHIL # BLD AUTO: 0.33 K/UL — SIGNIFICANT CHANGE UP (ref 0–0.5)
EOSINOPHIL NFR BLD AUTO: 3.4 % — SIGNIFICANT CHANGE UP (ref 0–6)
GLUCOSE BLDC GLUCOMTR-MCNC: 181 MG/DL — HIGH (ref 70–99)
GLUCOSE BLDC GLUCOMTR-MCNC: 186 MG/DL — HIGH (ref 70–99)
GLUCOSE BLDC GLUCOMTR-MCNC: 218 MG/DL — HIGH (ref 70–99)
GLUCOSE SERPL-MCNC: 220 MG/DL — HIGH (ref 70–99)
HCT VFR BLD CALC: 36.3 % — SIGNIFICANT CHANGE UP (ref 34.5–45)
HGB BLD-MCNC: 11.9 G/DL — SIGNIFICANT CHANGE UP (ref 11.5–15.5)
IMM GRANULOCYTES NFR BLD AUTO: 0.5 % — SIGNIFICANT CHANGE UP (ref 0–1.5)
LYMPHOCYTES # BLD AUTO: 2.38 K/UL — SIGNIFICANT CHANGE UP (ref 1–3.3)
LYMPHOCYTES # BLD AUTO: 24.3 % — SIGNIFICANT CHANGE UP (ref 13–44)
MAGNESIUM SERPL-MCNC: 1.7 MG/DL — SIGNIFICANT CHANGE UP (ref 1.6–2.6)
MCHC RBC-ENTMCNC: 30.5 PG — SIGNIFICANT CHANGE UP (ref 27–34)
MCHC RBC-ENTMCNC: 32.8 % — SIGNIFICANT CHANGE UP (ref 32–36)
MCV RBC AUTO: 93.1 FL — SIGNIFICANT CHANGE UP (ref 80–100)
MONOCYTES # BLD AUTO: 1.15 K/UL — HIGH (ref 0–0.9)
MONOCYTES NFR BLD AUTO: 11.8 % — SIGNIFICANT CHANGE UP (ref 2–14)
NEUTROPHILS # BLD AUTO: 5.81 K/UL — SIGNIFICANT CHANGE UP (ref 1.8–7.4)
NEUTROPHILS NFR BLD AUTO: 59.4 % — SIGNIFICANT CHANGE UP (ref 43–77)
NRBC # FLD: 0 K/UL — SIGNIFICANT CHANGE UP (ref 0–0)
PHOSPHATE SERPL-MCNC: 2.8 MG/DL — SIGNIFICANT CHANGE UP (ref 2.5–4.5)
PLATELET # BLD AUTO: 270 K/UL — SIGNIFICANT CHANGE UP (ref 150–400)
PMV BLD: 10.4 FL — SIGNIFICANT CHANGE UP (ref 7–13)
POTASSIUM SERPL-MCNC: 4.1 MMOL/L — SIGNIFICANT CHANGE UP (ref 3.5–5.3)
POTASSIUM SERPL-SCNC: 4.1 MMOL/L — SIGNIFICANT CHANGE UP (ref 3.5–5.3)
RBC # BLD: 3.9 M/UL — SIGNIFICANT CHANGE UP (ref 3.8–5.2)
RBC # FLD: 12.3 % — SIGNIFICANT CHANGE UP (ref 10.3–14.5)
SODIUM SERPL-SCNC: 134 MMOL/L — LOW (ref 135–145)
WBC # BLD: 9.78 K/UL — SIGNIFICANT CHANGE UP (ref 3.8–10.5)
WBC # FLD AUTO: 9.78 K/UL — SIGNIFICANT CHANGE UP (ref 3.8–10.5)

## 2020-02-18 PROCEDURE — 99233 SBSQ HOSP IP/OBS HIGH 50: CPT | Mod: GC

## 2020-02-18 PROCEDURE — 99232 SBSQ HOSP IP/OBS MODERATE 35: CPT

## 2020-02-18 RX ORDER — INSULIN LISPRO 100/ML
6 VIAL (ML) SUBCUTANEOUS
Refills: 0 | Status: DISCONTINUED | OUTPATIENT
Start: 2020-02-18 | End: 2020-02-19

## 2020-02-18 RX ORDER — INSULIN GLARGINE 100 [IU]/ML
18 INJECTION, SOLUTION SUBCUTANEOUS AT BEDTIME
Refills: 0 | Status: DISCONTINUED | OUTPATIENT
Start: 2020-02-18 | End: 2020-02-19

## 2020-02-18 RX ADMIN — ESCITALOPRAM OXALATE 20 MILLIGRAM(S): 10 TABLET, FILM COATED ORAL at 13:30

## 2020-02-18 RX ADMIN — Medication 5 UNIT(S): at 09:23

## 2020-02-18 RX ADMIN — Medication 2: at 09:23

## 2020-02-18 RX ADMIN — Medication 5 UNIT(S): at 13:29

## 2020-02-18 RX ADMIN — Medication 0.6 MILLIGRAM(S): at 13:29

## 2020-02-18 RX ADMIN — Medication 1: at 18:16

## 2020-02-18 RX ADMIN — Medication 75 MICROGRAM(S): at 06:33

## 2020-02-18 RX ADMIN — INSULIN GLARGINE 18 UNIT(S): 100 INJECTION, SOLUTION SUBCUTANEOUS at 22:39

## 2020-02-18 RX ADMIN — ENOXAPARIN SODIUM 40 MILLIGRAM(S): 100 INJECTION SUBCUTANEOUS at 13:30

## 2020-02-18 RX ADMIN — Medication 2: at 13:29

## 2020-02-18 RX ADMIN — Medication 6 UNIT(S): at 18:16

## 2020-02-18 RX ADMIN — DONEPEZIL HYDROCHLORIDE 10 MILLIGRAM(S): 10 TABLET, FILM COATED ORAL at 13:31

## 2020-02-18 RX ADMIN — RISPERIDONE 0.5 MILLIGRAM(S): 4 TABLET ORAL at 21:56

## 2020-02-18 NOTE — PROGRESS NOTE ADULT - PROBLEM SELECTOR PLAN 2
- c/w home donepezil and memantine  - follows Dr. Michelle: per last note in Jan 2020, mini mental status score 17-19,  has increased difficulty with ADLS and IADS, appears that pt needs constant prompting and supervision Uncontrolled DM w/ hyperglycemia   -  A1c 10.5  - c/w lantus 16u qhs, humalog 5u tid  - c/w moderate correction ISS  - endocrine recs appreciated   - dietician consult placed  - on oral metformin at home.

## 2020-02-18 NOTE — PROGRESS NOTE ADULT - PROBLEM SELECTOR PLAN 1
- p/w leukocytosis (15K), no bands, no clear source of infection  - CXR: clear lungs  - UA bland, from clean catch, no UTI symptoms, Ucx contaminated   - TSH wnl  - per family, pt is at baseline mental status, no recent falls, no need for CT head   - PT consulted, fall precautions  - f/u Nutritional consult   - son prefers assisted living for upon discharge - p/w leukocytosis (15K), no bands, resolved, no clear source of infection  - CXR: clear lungs  - UA bland, from clean catch, no UTI symptoms, Ucx contaminated   - TSH wnl  - per family, pt is at baseline mental status, no recent falls, no need for CT head   - PT consulted, fall precautions  - f/u Nutritional consult   - son prefers assisted living for upon discharge - p/w leukocytosis (15K), no bands, resolved, no clear source of infection. May be in setting of hyperglycemia.   - CXR: clear lungs  - UA bland, from clean catch, no UTI symptoms, Ucx contaminated   - TSH wnl  - per family, pt is at baseline mental status, no recent falls, no need for CT head   - PT consulted, fall precautions  - f/u Nutritional consult   - son prefers assisted living for upon discharge

## 2020-02-18 NOTE — PROGRESS NOTE ADULT - PROBLEM SELECTOR PLAN 6
NIDDM2 w/ hyperglycemia, A1c 10.5  - increased lantus from 10u --> 16u qhs, humalog 2u --> 5u tid  - increased to moderate correction ISS  - endocrine emailed f/u recs  - dietician consult placed  - on oral metformin at home NIDDM2 w/ hyperglycemia, A1c 10.5  - c/w lantus 16u qhs, humalog 5u tid  - c/w moderate correction ISS  - endocrine emailed f/u recs  - dietician consult placed  - on oral metformin at home NIDDM2 w/ hyperglycemia, A1c 10.5  - c/w lantus 16u qhs, humalog 5u tid  - c/w moderate correction ISS  - endocrine recs appreciated   - dietician consult placed  - on oral metformin at home - no clear infectious etiology at this time, CXR clear, UA negative  - blood cx NGTD  - monitor off abx for now

## 2020-02-18 NOTE — PROGRESS NOTE ADULT - PROBLEM SELECTOR PLAN 4
- Resolved  - mild hyponatremia on admission, Na+ 129, secondary to hyperglycemia - Resolved  - mild hyponatremia on admission, Na+ 129, secondary to hyperglycemia  - Monitor BMP - continue home levothyroxine  - TSH wnl

## 2020-02-18 NOTE — PROGRESS NOTE ADULT - PROBLEM SELECTOR PROBLEM 2
Alzheimer disease Type 2 diabetes mellitus with hyperglycemia, without long-term current use of insulin

## 2020-02-18 NOTE — PROGRESS NOTE ADULT - PROBLEM SELECTOR PLAN 3
- continue home levothyroxine  - TSH wnl - c/w home donepezil and memantine  - follows Dr. Michelle: per last note in Jan 2020, mini mental status score 17-19,  has increased difficulty with ADLS and IADS, appears that pt needs constant prompting and supervision

## 2020-02-18 NOTE — PROGRESS NOTE ADULT - PROBLEM SELECTOR PLAN 5
- no clear infectious etiology at this time, CXR clear, UA negative  - blood cx's ordered  - monitor off abx for now - Resolved  - mild hyponatremia on admission, Na+ 129, secondary to hyperglycemia  - Monitor BMP

## 2020-02-18 NOTE — PROGRESS NOTE ADULT - SUBJECTIVE AND OBJECTIVE BOX
***************************************************************  Trinhlizett Zapata, PGY1  Internal Medicine   pager: [phone] / [phone]  ***************************************************************    PROGRESS NOTE:     Patient is a 74y old  Female who presents with a chief complaint of Failure to thrive (17 Feb 2020 11:39)      SUBJECTIVE / OVERNIGHT EVENTS: No acute overnight events. Pt without complaints this morning. AOx2 (to name and location)     ADDITIONAL REVIEW OF SYSTEMS: Denies cp/f/c/abdominal pain/dysuria/sob.    MEDICATIONS  (STANDING):  colchicine 0.6 milliGRAM(s) Oral daily  dextrose 5%. 1000 milliLiter(s) (50 mL/Hr) IV Continuous <Continuous>  dextrose 50% Injectable 12.5 Gram(s) IV Push once  dextrose 50% Injectable 25 Gram(s) IV Push once  dextrose 50% Injectable 25 Gram(s) IV Push once  donepezil 10 milliGRAM(s) Oral daily  enoxaparin Injectable 40 milliGRAM(s) SubCutaneous daily  escitalopram 20 milliGRAM(s) Oral daily  insulin glargine Injectable (LANTUS) 16 Unit(s) SubCutaneous at bedtime  insulin lispro (HumaLOG) corrective regimen sliding scale   SubCutaneous three times a day before meals  insulin lispro (HumaLOG) corrective regimen sliding scale   SubCutaneous at bedtime  insulin lispro Injectable (HumaLOG) 5 Unit(s) SubCutaneous three times a day before meals  levothyroxine 75 MICROGram(s) Oral daily  risperiDONE   Tablet 0.5 milliGRAM(s) Oral at bedtime  sodium chloride 0.9%. 1000 milliLiter(s) (100 mL/Hr) IV Continuous <Continuous>    MEDICATIONS  (PRN):  dextrose 40% Gel 15 Gram(s) Oral once PRN Blood Glucose LESS THAN 70 milliGRAM(s)/deciliter  glucagon  Injectable 1 milliGRAM(s) IntraMuscular once PRN Glucose LESS THAN 70 milligrams/deciliter      CAPILLARY BLOOD GLUCOSE      POCT Blood Glucose.: 201 mg/dL (18 Feb 2020 08:49)  POCT Blood Glucose.: 224 mg/dL (17 Feb 2020 22:19)  POCT Blood Glucose.: 241 mg/dL (17 Feb 2020 18:28)  POCT Blood Glucose.: 270 mg/dL (17 Feb 2020 13:12)    I&O's Summary      PHYSICAL EXAM:  Vital Signs Last 24 Hrs  T(C): 37.1 (18 Feb 2020 06:27), Max: 37.3 (17 Feb 2020 21:36)  T(F): 98.7 (18 Feb 2020 06:27), Max: 99.2 (17 Feb 2020 21:36)  HR: 73 (18 Feb 2020 06:27) (73 - 90)  BP: 120/60 (18 Feb 2020 06:27) (120/60 - 135/80)  BP(mean): --  RR: 18 (18 Feb 2020 06:27) (18 - 18)  SpO2: 97% (18 Feb 2020 06:27) (95% - 97%)    HEENT: NCAT.  PERRL.  EOMI.  No scleral icterus or injection.  Moist MM.     Neck: Supple.  Full ROM.  No JVD.   Heart: RRR.  Normal S1 and S2.  No murmurs   Lungs: CTAB. No wheezes   Abdomen: BS+, soft, NT/ND.    Skin: Warm and dry.  No rashes.  Extremities: No edema, clubbing, or cyanosis.  2+ peripheral pulses b/l.  Musculoskeletal: No spinal or paraspinal tenderness.  Neuro: A&Ox2 (to self, knows she is at a hospital), no focal neuro deficits    LABS:                        12.5   11.85 )-----------( 344      ( 17 Feb 2020 11:45 )             36.7     02-17    134<L>  |  95<L>  |  14  ----------------------------<  224<H>  4.1   |  25  |  0.90    Ca    9.5      17 Feb 2020 11:45  Phos  2.9     02-17  Mg     1.7     02-17    TPro  7.3  /  Alb  3.2<L>  /  TBili  0.4  /  DBili  x   /  AST  27  /  ALT  13  /  AlkPhos  77  02-16              Culture - Blood (collected 16 Feb 2020 15:45)  Source: BLOOD PERIPHERAL  Preliminary Report (17 Feb 2020 15:45):    NO ORGANISMS ISOLATED    NO ORGANISMS ISOLATED AT 24 HOURS    Culture - Urine (collected 15 Feb 2020 18:52)  Source: URINE MIDSTREAM  Final Report (17 Feb 2020 08:45):    AFTER 24 HOURS INCUBATION    Culture grew 3 or more types of organisms which indicate    collection contamination; consider recollection only if    clinically indicated.    Normal genitourinary radha also present        RADIOLOGY & ADDITIONAL TESTS:  Results Reviewed:   Imaging Personally Reviewed:  Electrocardiogram Personally Reviewed:    COORDINATION OF CARE:  Care Discussed with Consultants/Other Providers [Y/N]:  Prior or Outpatient Records Reviewed [Y/N]:

## 2020-02-18 NOTE — PROGRESS NOTE ADULT - SUBJECTIVE AND OBJECTIVE BOX
Chief Complaint: t2dm    History:  no n/v, denies s/s of hypoglycemia.  does not remember breakfast, but says has good appetite for lunch    MEDICATIONS  (STANDING):  colchicine 0.6 milliGRAM(s) Oral daily  dextrose 5%. 1000 milliLiter(s) (50 mL/Hr) IV Continuous <Continuous>  dextrose 50% Injectable 12.5 Gram(s) IV Push once  dextrose 50% Injectable 25 Gram(s) IV Push once  dextrose 50% Injectable 25 Gram(s) IV Push once  donepezil 10 milliGRAM(s) Oral daily  enoxaparin Injectable 40 milliGRAM(s) SubCutaneous daily  escitalopram 20 milliGRAM(s) Oral daily  insulin glargine Injectable (LANTUS) 16 Unit(s) SubCutaneous at bedtime  insulin lispro (HumaLOG) corrective regimen sliding scale   SubCutaneous three times a day before meals  insulin lispro (HumaLOG) corrective regimen sliding scale   SubCutaneous at bedtime  insulin lispro Injectable (HumaLOG) 5 Unit(s) SubCutaneous three times a day before meals  levothyroxine 75 MICROGram(s) Oral daily  risperiDONE   Tablet 0.5 milliGRAM(s) Oral at bedtime    MEDICATIONS  (PRN):  dextrose 40% Gel 15 Gram(s) Oral once PRN Blood Glucose LESS THAN 70 milliGRAM(s)/deciliter  glucagon  Injectable 1 milliGRAM(s) IntraMuscular once PRN Glucose LESS THAN 70 milligrams/deciliter      Allergies    eggs (Unknown)  penicillins (Unknown)    Intolerances      Review of Systems:  Constitutional: No fever  Eyes: No blurry vision    ALL OTHER SYSTEMS REVIEWED AND NEGATIVE      PHYSICAL EXAM:  VITALS: T(C): 37.1 (02-18-20 @ 06:27)  T(F): 98.7 (02-18-20 @ 06:27), Max: 99.2 (02-17-20 @ 21:36)  HR: 73 (02-18-20 @ 06:27) (73 - 90)  BP: 120/60 (02-18-20 @ 06:27) (120/60 - 135/80)  RR:  (18 - 18)  SpO2:  (95% - 97%)  Wt(kg): --  GENERAL: NAD, well-developed  GI: Soft, nontender, non distended  SKIN: Dry, intact, No rashes or lesions  PSYCH: Alert and oriented x 2, normal affect, normal mood      CAPILLARY BLOOD GLUCOSE  POCT Blood Glucose.: 218 mg/dL (18 Feb 2020 13:20)  POCT Blood Glucose.: 201 mg/dL (18 Feb 2020 08:49)  POCT Blood Glucose.: 224 mg/dL (17 Feb 2020 22:19)  POCT Blood Glucose.: 241 mg/dL (17 Feb 2020 18:28)      02-18    134<L>  |  96<L>  |  19  ----------------------------<  220<H>  4.1   |  25  |  0.97    EGFR if : 66  EGFR if non : 57    Ca    9.3      02-18  Mg     1.7     02-18  Phos  2.8     02-18    TPro  7.3  /  Alb  3.2<L>  /  TBili  0.4  /  DBili  x   /  AST  27  /  ALT  13  /  AlkPhos  77  02-16          Thyroid Function Tests:  02-15 @ 17:47 TSH 3.61 FreeT4 -- T3 -- Anti TPO -- Anti Thyroglobulin Ab -- TSI --      Hemoglobin A1C, Whole Blood: 10.5 % <H> [4.0 - 5.6] (02-16-20 @ 07:00)

## 2020-02-18 NOTE — PROGRESS NOTE ADULT - ATTENDING COMMENTS
Patient seen and examined, chart and labs reviewed. Case discussed with house staff. Patient seen and examined, chart and labs reviewed. Case discussed with house staff.    74F with Alzheimer's disease admitted with inability to care for self at home found to have uncontrolled diabetes with hyperglycemia and leukocytosis of unclear etiology.  Endo recs appreciated for DM regimen, will increase Lantus/Humalog per recs. Patient's family prefers dispo to rehab, placement pending. Patient currently at baseline mental status and no infectious source found.

## 2020-02-18 NOTE — PROVIDER CONTACT NOTE (OTHER) - ASSESSMENT
temp 101.4f  hr 93/mt bp 123/64mmhg
Principal Discharge DX:	 (spontaneous vaginal delivery)  Goal:	Routine recovery  Assessment and plan of treatment:	Routine postpartum care

## 2020-02-18 NOTE — PROGRESS NOTE ADULT - ASSESSMENT
74y woman with Alzheimer's disease, HTN, Hypothyroidism, Type 2 DM (A1C 10.5), Depression, gout, brought in by family member for decreased inability to take care of herself. Endocrine consulted for Type 2 DM with hyperglycemia.  This is a HIGH RISK pt. w/ HIGH LEVEL clinical decision making 2/2 her age, and severely uncontrolled DM2 which puts her at high risk for both microvascular and macrovascular complications.    uncontrolled DM2  - target bg 100-200 mg/dl- no need for tight control given pt age, altered MS, and risk for hypoglycemia  - increase lantus to 18 units sq qhs  - increase humalog to 6 units sq tid ac  - c/w humalog low correction scale ac and hs    dc planning- with a1c 10.5%, and patient only on metformin 500 mg po daily, would maximize dose to metformin 1000 mg po BID plus dpp4 like tradjenta or januvia, if no contraindications.  Target a1c for this patient is 7-8%.  Can consider basal insulin plus PO regimen if family is willing to do daily insulin injections.  If patient is dc'd to LTC, can do basal/po plus correction scale.  DC plan TBD     HLD-  please check lipid panel in AM    HTN-  can monitor off BP meds for now.  If BP trends are high, would consider starting ACE i or ARB     Hypothyroid  c/w levothyroxine 75 micrograms po daily- give on an empty stomach, 30 min prior to meal

## 2020-02-18 NOTE — PROGRESS NOTE ADULT - ASSESSMENT
74y woman with Alzheimer's disease, HTN, Hypothyroidism, Depression, gout, brought in by family member for increased inability to take care of herself.  Found to have uncontrolled diabetes with hyperglycemia and leukocytosis of unclear etiology.

## 2020-02-19 LAB
GLUCOSE BLDC GLUCOMTR-MCNC: 147 MG/DL — HIGH (ref 70–99)
GLUCOSE BLDC GLUCOMTR-MCNC: 196 MG/DL — HIGH (ref 70–99)
GLUCOSE BLDC GLUCOMTR-MCNC: 211 MG/DL — HIGH (ref 70–99)
GLUCOSE BLDC GLUCOMTR-MCNC: 224 MG/DL — HIGH (ref 70–99)

## 2020-02-19 PROCEDURE — 99233 SBSQ HOSP IP/OBS HIGH 50: CPT | Mod: GC

## 2020-02-19 PROCEDURE — 99232 SBSQ HOSP IP/OBS MODERATE 35: CPT

## 2020-02-19 RX ORDER — INSULIN LISPRO 100/ML
7 VIAL (ML) SUBCUTANEOUS
Refills: 0 | Status: DISCONTINUED | OUTPATIENT
Start: 2020-02-19 | End: 2020-02-21

## 2020-02-19 RX ORDER — INSULIN GLARGINE 100 [IU]/ML
20 INJECTION, SOLUTION SUBCUTANEOUS AT BEDTIME
Refills: 0 | Status: DISCONTINUED | OUTPATIENT
Start: 2020-02-19 | End: 2020-02-21

## 2020-02-19 RX ADMIN — Medication 7 UNIT(S): at 18:20

## 2020-02-19 RX ADMIN — ESCITALOPRAM OXALATE 20 MILLIGRAM(S): 10 TABLET, FILM COATED ORAL at 13:21

## 2020-02-19 RX ADMIN — Medication 7 UNIT(S): at 13:20

## 2020-02-19 RX ADMIN — DONEPEZIL HYDROCHLORIDE 10 MILLIGRAM(S): 10 TABLET, FILM COATED ORAL at 13:21

## 2020-02-19 RX ADMIN — Medication 75 MICROGRAM(S): at 05:03

## 2020-02-19 RX ADMIN — Medication 0.6 MILLIGRAM(S): at 13:21

## 2020-02-19 RX ADMIN — Medication 2: at 13:17

## 2020-02-19 RX ADMIN — Medication 6 UNIT(S): at 09:23

## 2020-02-19 RX ADMIN — INSULIN GLARGINE 20 UNIT(S): 100 INJECTION, SOLUTION SUBCUTANEOUS at 22:28

## 2020-02-19 RX ADMIN — Medication 1: at 09:23

## 2020-02-19 RX ADMIN — ENOXAPARIN SODIUM 40 MILLIGRAM(S): 100 INJECTION SUBCUTANEOUS at 13:21

## 2020-02-19 RX ADMIN — RISPERIDONE 0.5 MILLIGRAM(S): 4 TABLET ORAL at 22:28

## 2020-02-19 NOTE — PROGRESS NOTE ADULT - PROBLEM SELECTOR PLAN 1
- p/w leukocytosis (15K), no bands, resolved, no clear source of infection. May be in setting of hyperglycemia.   - CXR: clear lungs  - UA bland, from clean catch, no UTI symptoms, Ucx contaminated   - TSH wnl  - per family, pt is at baseline mental status, no recent falls, no need for CT head   - PT consulted, fall precautions  - f/u Nutritional consult   - son prefers assisted living for upon discharge

## 2020-02-19 NOTE — PROGRESS NOTE ADULT - PROBLEM SELECTOR PLAN 2
Uncontrolled DM w/ hyperglycemia   -  A1c 10.5  - increased lantus to 18u qhs, humalog 6u tid  - c/w moderate correction ISS  - endocrine recs appreciated   - dietician consult placed  - on oral metformin at home. Uncontrolled DM w/ hyperglycemia   -  A1c 10.5  - increased lantus to 20u qhs, humalog 7u tid  - c/w moderate correction ISS  - endocrine recs appreciated   - dietician consult placed  - on oral metformin at home.

## 2020-02-19 NOTE — PROGRESS NOTE ADULT - PROBLEM SELECTOR PLAN 3
- c/w home donepezil and memantine  - follows Dr. Michelle: per last note in Jan 2020, mini mental status score 17-19,  has increased difficulty with ADLS and IADS, appears that pt needs constant prompting and supervision

## 2020-02-19 NOTE — PROGRESS NOTE ADULT - SUBJECTIVE AND OBJECTIVE BOX
Chief Complaint: t2dm    History:  no n/v, denies s/s of hypoglycemia.  does not remember if she ate breakfast    MEDICATIONS  (STANDING):  colchicine 0.6 milliGRAM(s) Oral daily  dextrose 5%. 1000 milliLiter(s) (50 mL/Hr) IV Continuous <Continuous>  dextrose 50% Injectable 12.5 Gram(s) IV Push once  dextrose 50% Injectable 25 Gram(s) IV Push once  dextrose 50% Injectable 25 Gram(s) IV Push once  donepezil 10 milliGRAM(s) Oral daily  enoxaparin Injectable 40 milliGRAM(s) SubCutaneous daily  escitalopram 20 milliGRAM(s) Oral daily  insulin glargine Injectable (LANTUS) 16 Unit(s) SubCutaneous at bedtime  insulin lispro (HumaLOG) corrective regimen sliding scale   SubCutaneous three times a day before meals  insulin lispro (HumaLOG) corrective regimen sliding scale   SubCutaneous at bedtime  insulin lispro Injectable (HumaLOG) 5 Unit(s) SubCutaneous three times a day before meals  levothyroxine 75 MICROGram(s) Oral daily  risperiDONE   Tablet 0.5 milliGRAM(s) Oral at bedtime    MEDICATIONS  (PRN):  dextrose 40% Gel 15 Gram(s) Oral once PRN Blood Glucose LESS THAN 70 milliGRAM(s)/deciliter  glucagon  Injectable 1 milliGRAM(s) IntraMuscular once PRN Glucose LESS THAN 70 milligrams/deciliter      Allergies    eggs (Unknown)  penicillins (Unknown)    Intolerances      Review of Systems:  Constitutional: No fever  Eyes: No blurry vision    ALL OTHER SYSTEMS REVIEWED AND NEGATIVE    Vital Signs Last 24 Hrs  T(C): 36.9 (19 Feb 2020 14:50), Max: 37 (18 Feb 2020 21:57)  T(F): 98.5 (19 Feb 2020 14:50), Max: 98.6 (18 Feb 2020 21:57)  HR: 85 (19 Feb 2020 14:50) (72 - 85)  BP: 118/65 (19 Feb 2020 14:50) (118/65 - 144/64)  BP(mean): --  RR: 20 (19 Feb 2020 14:50) (18 - 20)  SpO2: 100% (19 Feb 2020 14:50) (95% - 100%)  GENERAL: NAD, well-developed  GI: Soft, nontender, non distended  SKIN: Dry, intact, No rashes or lesions  PSYCH: Alert and oriented x 2, normal affect, normal mood      CAPILLARY BLOOD GLUCOSE    POCT Blood Glucose.: 211 mg/dL (19 Feb 2020 13:05)  POCT Blood Glucose.: 196 mg/dL (19 Feb 2020 09:04)  POCT Blood Glucose.: 186 mg/dL (18 Feb 2020 22:36)  POCT Blood Glucose.: 181 mg/dL (18 Feb 2020 17:44)    POCT Blood Glucose.: 218 mg/dL (18 Feb 2020 13:20)  POCT Blood Glucose.: 201 mg/dL (18 Feb 2020 08:49)  POCT Blood Glucose.: 224 mg/dL (17 Feb 2020 22:19)  POCT Blood Glucose.: 241 mg/dL (17 Feb 2020 18:28)      02-18    134<L>  |  96<L>  |  19  ----------------------------<  220<H>  4.1   |  25  |  0.97    Ca    9.3      18 Feb 2020 08:30  Phos  2.8     02-18  Mg     1.7     02-18        Thyroid Function Tests:  02-15 @ 17:47 TSH 3.61 FreeT4 -- T3 -- Anti TPO -- Anti Thyroglobulin Ab -- TSI --      Hemoglobin A1C, Whole Blood: 10.5 % <H> [4.0 - 5.6] (02-16-20 @ 07:00)

## 2020-02-19 NOTE — PROGRESS NOTE ADULT - ASSESSMENT
74y woman with Alzheimer's disease, HTN, Hypothyroidism, Type 2 DM (A1C 10.5), Depression, gout, brought in by family member for decreased inability to take care of herself. Endocrine consulted for Type 2 DM with hyperglycemia.  This is a HIGH RISK pt. w/ HIGH LEVEL clinical decision making 2/2 her age, and severely uncontrolled DM2 which puts her at high risk for both microvascular and macrovascular complications.    uncontrolled DM2  - target bg 100-200 mg/dl- no need for tight control given pt age, altered MS, and risk for hypoglycemia  - increase lantus to 20 units sq qhs  - increase humalog to 7 units sq tid ac  - c/w humalog low correction scale ac and hs    dc planning- with a1c 10.5%, and patient only on metformin 500 mg po daily, would maximize dose to metformin 1000 mg po BID plus dpp4 like tradjenta or januvia, if no contraindications.  Target a1c for this patient is 7-8%.  Can consider basal insulin plus PO regimen if family is willing to do daily insulin injections.  If patient is dc'd to LTC, can do basal/po vs basal bolus.  DC plan TBD     HLD-  please check lipid panel in AM, previously recommended on prior notes.  Order placed for lipid panel to be done with AM labs    HTN-  can monitor off BP meds for now.  If BP trends are high, would consider starting ACE i or ARB     Hypothyroid  c/w levothyroxine 75 micrograms po daily- give on an empty stomach, 30 min prior to meal      Plan discussed with Dr. Diaz

## 2020-02-19 NOTE — PROGRESS NOTE ADULT - PROBLEM SELECTOR PLAN 10
Transitions of Care Status:  1.  Name of PCP: Dr. Gray   2.  PCP Contacted on Admission: [ ] Y    [x ] N  3.  PCP contacted at Discharge: [ ] Y    [ ] N    [ ] N/A  4.  Post-Discharge Appointment Date and Location:  5.  Summary of Handoff given to PCP: Transitions of Care Status:  1.  Name of PCP: Dr. Gray   2.  PCP Contacted on Admission: [X] Y    [ ] N Email sent to Dr. Gray  3.  PCP contacted at Discharge: [ ] Y    [ ] N    [ ] N/A  4.  Post-Discharge Appointment Date and Location:  5.  Summary of Handoff given to PCP:

## 2020-02-19 NOTE — PROGRESS NOTE ADULT - ATTENDING COMMENTS
Patient seen and examined, chart and labs reviewed. Case discussed with house staff.    74F with Alzheimer's disease admitted with inability to care for self at home found to have uncontrolled diabetes with hyperglycemia and leukocytosis of unclear etiology.  Endo recs appreciated for DM regimen, d/w endo IGNACIO Lynch, recommends basal/bolus regimen on discharge is patient is going to [facility]/LTC, otherwise patient's family would need to learn insulin injections if going home. Patient's family prefers dispo to rehab, placement pending. Patient currently at baseline mental status and no infectious source found.

## 2020-02-19 NOTE — PROGRESS NOTE ADULT - SUBJECTIVE AND OBJECTIVE BOX
***************************************************************  Trinh Benny, PGY1  Internal Medicine   pager: [phone]  ***************************************************************    PROGRESS NOTE:     Patient is a 74y old  Female who presents with a chief complaint of Failure to thrive (18 Feb 2020 14:28)      SUBJECTIVE / OVERNIGHT EVENTS: No acute overnight events. Pt without complaints this morning. AOx2 (to name and location)     ADDITIONAL REVIEW OF SYSTEMS: Denies cp/f/c/abdominal pain/dysuria/sob.    MEDICATIONS  (STANDING):  colchicine 0.6 milliGRAM(s) Oral daily  dextrose 5%. 1000 milliLiter(s) (50 mL/Hr) IV Continuous <Continuous>  dextrose 50% Injectable 12.5 Gram(s) IV Push once  dextrose 50% Injectable 25 Gram(s) IV Push once  dextrose 50% Injectable 25 Gram(s) IV Push once  donepezil 10 milliGRAM(s) Oral daily  enoxaparin Injectable 40 milliGRAM(s) SubCutaneous daily  escitalopram 20 milliGRAM(s) Oral daily  insulin glargine Injectable (LANTUS) 18 Unit(s) SubCutaneous at bedtime  insulin lispro (HumaLOG) corrective regimen sliding scale   SubCutaneous three times a day before meals  insulin lispro (HumaLOG) corrective regimen sliding scale   SubCutaneous at bedtime  insulin lispro Injectable (HumaLOG) 6 Unit(s) SubCutaneous three times a day before meals  levothyroxine 75 MICROGram(s) Oral daily  risperiDONE   Tablet 0.5 milliGRAM(s) Oral at bedtime    MEDICATIONS  (PRN):  dextrose 40% Gel 15 Gram(s) Oral once PRN Blood Glucose LESS THAN 70 milliGRAM(s)/deciliter  glucagon  Injectable 1 milliGRAM(s) IntraMuscular once PRN Glucose LESS THAN 70 milligrams/deciliter      CAPILLARY BLOOD GLUCOSE      POCT Blood Glucose.: 196 mg/dL (19 Feb 2020 09:04)  POCT Blood Glucose.: 186 mg/dL (18 Feb 2020 22:36)  POCT Blood Glucose.: 181 mg/dL (18 Feb 2020 17:44)  POCT Blood Glucose.: 218 mg/dL (18 Feb 2020 13:20)    I&O's Summary      PHYSICAL EXAM:  Vital Signs Last 24 Hrs  T(C): 36.8 (19 Feb 2020 06:13), Max: 37 (18 Feb 2020 21:57)  T(F): 98.3 (19 Feb 2020 06:13), Max: 98.6 (18 Feb 2020 21:57)  HR: 72 (19 Feb 2020 06:13) (70 - 72)  BP: 126/60 (19 Feb 2020 06:13) (123/86 - 144/64)  BP(mean): --  RR: 18 (19 Feb 2020 06:13) (18 - 18)  SpO2: 99% (19 Feb 2020 06:13) (94% - 99%)      HEENT: NCAT.  PERRL.  EOMI.  No scleral icterus or injection.  Moist MM.     Neck: Supple.  Full ROM.  No JVD.   Heart: RRR.  Normal S1 and S2.  No murmurs   Lungs: CTAB. No wheezes   Abdomen: BS+, soft, NT/ND.    Skin: Warm and dry.  No rashes.  Extremities: No edema, clubbing, or cyanosis.  2+ peripheral pulses b/l.  Musculoskeletal: No spinal or paraspinal tenderness.  Neuro: A&Ox2 (to self, knows she is at a hospital), no focal neuro deficits  LABS:                        11.9   9.78  )-----------( 270      ( 18 Feb 2020 08:30 )             36.3     02-18    134<L>  |  96<L>  |  19  ----------------------------<  220<H>  4.1   |  25  |  0.97    Ca    9.3      18 Feb 2020 08:30  Phos  2.8     02-18  Mg     1.7     02-18                Culture - Blood (collected 16 Feb 2020 15:45)  Source: BLOOD PERIPHERAL  Preliminary Report (18 Feb 2020 15:44):    NO ORGANISMS ISOLATED    NO ORGANISMS ISOLATED AT 48 HRS.        RADIOLOGY & ADDITIONAL TESTS:  Results Reviewed:   Imaging Personally Reviewed:  Electrocardiogram Personally Reviewed:    COORDINATION OF CARE:  Care Discussed with Consultants/Other Providers [Y/N]:  Prior or Outpatient Records Reviewed [Y/N]: ***************************************************************  Trinh Benny, PGY1  Internal Medicine   pager: [phone] / [phone]  ***************************************************************    PROGRESS NOTE:     Patient is a 74y old  Female who presents with a chief complaint of Failure to thrive (18 Feb 2020 14:28)      SUBJECTIVE / OVERNIGHT EVENTS: No acute overnight events. Pt without complaints this morning. AOx2 (to name and location)     ADDITIONAL REVIEW OF SYSTEMS: Denies cp/f/c/abdominal pain/dysuria/sob.    MEDICATIONS  (STANDING):  colchicine 0.6 milliGRAM(s) Oral daily  dextrose 5%. 1000 milliLiter(s) (50 mL/Hr) IV Continuous <Continuous>  dextrose 50% Injectable 12.5 Gram(s) IV Push once  dextrose 50% Injectable 25 Gram(s) IV Push once  dextrose 50% Injectable 25 Gram(s) IV Push once  donepezil 10 milliGRAM(s) Oral daily  enoxaparin Injectable 40 milliGRAM(s) SubCutaneous daily  escitalopram 20 milliGRAM(s) Oral daily  insulin glargine Injectable (LANTUS) 18 Unit(s) SubCutaneous at bedtime  insulin lispro (HumaLOG) corrective regimen sliding scale   SubCutaneous three times a day before meals  insulin lispro (HumaLOG) corrective regimen sliding scale   SubCutaneous at bedtime  insulin lispro Injectable (HumaLOG) 6 Unit(s) SubCutaneous three times a day before meals  levothyroxine 75 MICROGram(s) Oral daily  risperiDONE   Tablet 0.5 milliGRAM(s) Oral at bedtime    MEDICATIONS  (PRN):  dextrose 40% Gel 15 Gram(s) Oral once PRN Blood Glucose LESS THAN 70 milliGRAM(s)/deciliter  glucagon  Injectable 1 milliGRAM(s) IntraMuscular once PRN Glucose LESS THAN 70 milligrams/deciliter      CAPILLARY BLOOD GLUCOSE      POCT Blood Glucose.: 196 mg/dL (19 Feb 2020 09:04)  POCT Blood Glucose.: 186 mg/dL (18 Feb 2020 22:36)  POCT Blood Glucose.: 181 mg/dL (18 Feb 2020 17:44)  POCT Blood Glucose.: 218 mg/dL (18 Feb 2020 13:20)    I&O's Summary      PHYSICAL EXAM:  Vital Signs Last 24 Hrs  T(C): 36.8 (19 Feb 2020 06:13), Max: 37 (18 Feb 2020 21:57)  T(F): 98.3 (19 Feb 2020 06:13), Max: 98.6 (18 Feb 2020 21:57)  HR: 72 (19 Feb 2020 06:13) (70 - 72)  BP: 126/60 (19 Feb 2020 06:13) (123/86 - 144/64)  BP(mean): --  RR: 18 (19 Feb 2020 06:13) (18 - 18)  SpO2: 99% (19 Feb 2020 06:13) (94% - 99%)      HEENT: NCAT.  PERRL.  EOMI.  No scleral icterus or injection.  Moist MM.     Neck: Supple.  Full ROM.  No JVD.   Heart: RRR.  Normal S1 and S2.  No murmurs   Lungs: CTAB. No wheezes   Abdomen: BS+, soft, NT/ND.    Skin: Warm and dry.  No rashes.  Extremities: No edema, clubbing, or cyanosis.  2+ peripheral pulses b/l.  Musculoskeletal: No spinal or paraspinal tenderness.  Neuro: A&Ox2 (to self, knows she is at a hospital), no focal neuro deficits    LABS:                        11.9   9.78  )-----------( 270      ( 18 Feb 2020 08:30 )             36.3     02-18    134<L>  |  96<L>  |  19  ----------------------------<  220<H>  4.1   |  25  |  0.97    Ca    9.3      18 Feb 2020 08:30  Phos  2.8     02-18  Mg     1.7     02-18                Culture - Blood (collected 16 Feb 2020 15:45)  Source: BLOOD PERIPHERAL  Preliminary Report (18 Feb 2020 15:44):    NO ORGANISMS ISOLATED    NO ORGANISMS ISOLATED AT 48 HRS.        RADIOLOGY & ADDITIONAL TESTS:  Results Reviewed:   Imaging Personally Reviewed:  Electrocardiogram Personally Reviewed:    COORDINATION OF CARE:  Care Discussed with Consultants/Other Providers [Y/N]:  Prior or Outpatient Records Reviewed [Y/N]: ***************************************************************  Trinh Benny, PGY1  Internal Medicine   pager: [phone]  ***************************************************************    PROGRESS NOTE:     Patient is a 74y old  Female who presents with a chief complaint of Failure to thrive (18 Feb 2020 14:28)      SUBJECTIVE / OVERNIGHT EVENTS: No acute overnight events. Pt without complaints this morning. AOx1 (to name only)     ADDITIONAL REVIEW OF SYSTEMS: Denies cp/f/c/abdominal pain/dysuria/sob.    MEDICATIONS  (STANDING):  colchicine 0.6 milliGRAM(s) Oral daily  dextrose 5%. 1000 milliLiter(s) (50 mL/Hr) IV Continuous <Continuous>  dextrose 50% Injectable 12.5 Gram(s) IV Push once  dextrose 50% Injectable 25 Gram(s) IV Push once  dextrose 50% Injectable 25 Gram(s) IV Push once  donepezil 10 milliGRAM(s) Oral daily  enoxaparin Injectable 40 milliGRAM(s) SubCutaneous daily  escitalopram 20 milliGRAM(s) Oral daily  insulin glargine Injectable (LANTUS) 18 Unit(s) SubCutaneous at bedtime  insulin lispro (HumaLOG) corrective regimen sliding scale   SubCutaneous three times a day before meals  insulin lispro (HumaLOG) corrective regimen sliding scale   SubCutaneous at bedtime  insulin lispro Injectable (HumaLOG) 6 Unit(s) SubCutaneous three times a day before meals  levothyroxine 75 MICROGram(s) Oral daily  risperiDONE   Tablet 0.5 milliGRAM(s) Oral at bedtime    MEDICATIONS  (PRN):  dextrose 40% Gel 15 Gram(s) Oral once PRN Blood Glucose LESS THAN 70 milliGRAM(s)/deciliter  glucagon  Injectable 1 milliGRAM(s) IntraMuscular once PRN Glucose LESS THAN 70 milligrams/deciliter      CAPILLARY BLOOD GLUCOSE      POCT Blood Glucose.: 196 mg/dL (19 Feb 2020 09:04)  POCT Blood Glucose.: 186 mg/dL (18 Feb 2020 22:36)  POCT Blood Glucose.: 181 mg/dL (18 Feb 2020 17:44)  POCT Blood Glucose.: 218 mg/dL (18 Feb 2020 13:20)    I&O's Summary      PHYSICAL EXAM:  Vital Signs Last 24 Hrs  T(C): 36.8 (19 Feb 2020 06:13), Max: 37 (18 Feb 2020 21:57)  T(F): 98.3 (19 Feb 2020 06:13), Max: 98.6 (18 Feb 2020 21:57)  HR: 72 (19 Feb 2020 06:13) (70 - 72)  BP: 126/60 (19 Feb 2020 06:13) (123/86 - 144/64)  BP(mean): --  RR: 18 (19 Feb 2020 06:13) (18 - 18)  SpO2: 99% (19 Feb 2020 06:13) (94% - 99%)      HEENT: NCAT.  PERRL.  EOMI.  No scleral icterus or injection.  Moist MM.     Neck: Supple.  Full ROM.  No JVD.   Heart: RRR.  Normal S1 and S2.  No murmurs   Lungs: CTAB. No wheezes   Abdomen: BS+, soft, NT/ND.    Skin: Warm and dry.  No rashes.  Extremities: No edema, clubbing, or cyanosis.  2+ peripheral pulses b/l.  Musculoskeletal: No spinal or paraspinal tenderness.  Neuro: A&Ox2 (to self, knows she is at a hospital), no focal neuro deficits    LABS:                        11.9   9.78  )-----------( 270      ( 18 Feb 2020 08:30 )             36.3     02-18    134<L>  |  96<L>  |  19  ----------------------------<  220<H>  4.1   |  25  |  0.97    Ca    9.3      18 Feb 2020 08:30  Phos  2.8     02-18  Mg     1.7     02-18                Culture - Blood (collected 16 Feb 2020 15:45)  Source: BLOOD PERIPHERAL  Preliminary Report (18 Feb 2020 15:44):    NO ORGANISMS ISOLATED    NO ORGANISMS ISOLATED AT 48 HRS.        RADIOLOGY & ADDITIONAL TESTS:  Results Reviewed:   Imaging Personally Reviewed:  Electrocardiogram Personally Reviewed:    COORDINATION OF CARE:  Care Discussed with Consultants/Other Providers [Y/N]:  Prior or Outpatient Records Reviewed [Y/N]: ***************************************************************  Trinh Benny, PGY1  Internal Medicine   pager: [phone]  ***************************************************************    PROGRESS NOTE:     Patient is a 74y old  Female who presents with a chief complaint of Failure to thrive (18 Feb 2020 14:28)      SUBJECTIVE / OVERNIGHT EVENTS: No acute overnight events. Pt without complaints this morning. AOx1 (to name only)     ADDITIONAL REVIEW OF SYSTEMS: Denies cp/f/c/abdominal pain/dysuria/sob.    MEDICATIONS  (STANDING):  colchicine 0.6 milliGRAM(s) Oral daily  dextrose 5%. 1000 milliLiter(s) (50 mL/Hr) IV Continuous <Continuous>  dextrose 50% Injectable 12.5 Gram(s) IV Push once  dextrose 50% Injectable 25 Gram(s) IV Push once  dextrose 50% Injectable 25 Gram(s) IV Push once  donepezil 10 milliGRAM(s) Oral daily  enoxaparin Injectable 40 milliGRAM(s) SubCutaneous daily  escitalopram 20 milliGRAM(s) Oral daily  insulin glargine Injectable (LANTUS) 18 Unit(s) SubCutaneous at bedtime  insulin lispro (HumaLOG) corrective regimen sliding scale   SubCutaneous three times a day before meals  insulin lispro (HumaLOG) corrective regimen sliding scale   SubCutaneous at bedtime  insulin lispro Injectable (HumaLOG) 6 Unit(s) SubCutaneous three times a day before meals  levothyroxine 75 MICROGram(s) Oral daily  risperiDONE   Tablet 0.5 milliGRAM(s) Oral at bedtime    MEDICATIONS  (PRN):  dextrose 40% Gel 15 Gram(s) Oral once PRN Blood Glucose LESS THAN 70 milliGRAM(s)/deciliter  glucagon  Injectable 1 milliGRAM(s) IntraMuscular once PRN Glucose LESS THAN 70 milligrams/deciliter      CAPILLARY BLOOD GLUCOSE      POCT Blood Glucose.: 196 mg/dL (19 Feb 2020 09:04)  POCT Blood Glucose.: 186 mg/dL (18 Feb 2020 22:36)  POCT Blood Glucose.: 181 mg/dL (18 Feb 2020 17:44)  POCT Blood Glucose.: 218 mg/dL (18 Feb 2020 13:20)    I&O's Summary      PHYSICAL EXAM:  Vital Signs Last 24 Hrs  T(C): 36.8 (19 Feb 2020 06:13), Max: 37 (18 Feb 2020 21:57)  T(F): 98.3 (19 Feb 2020 06:13), Max: 98.6 (18 Feb 2020 21:57)  HR: 72 (19 Feb 2020 06:13) (70 - 72)  BP: 126/60 (19 Feb 2020 06:13) (123/86 - 144/64)  BP(mean): --  RR: 18 (19 Feb 2020 06:13) (18 - 18)  SpO2: 99% (19 Feb 2020 06:13) (94% - 99%)      HEENT: NCAT.  PERRL.  EOMI.  No scleral icterus or injection.  Moist MM.     Neck: Supple.  Full ROM.  No JVD.   Heart: RRR.  Normal S1 and S2.  No murmurs   Lungs: CTAB. No wheezes   Abdomen: BS+, soft, NT/ND.    Skin: Warm and dry.  No rashes.  Extremities: No edema, clubbing, or cyanosis.  2+ peripheral pulses b/l.  Musculoskeletal: No spinal or paraspinal tenderness.  Neuro: A&Ox1 (to self, knows she is at a hospital), no focal neuro deficits    LABS:                        11.9   9.78  )-----------( 270      ( 18 Feb 2020 08:30 )             36.3     02-18    134<L>  |  96<L>  |  19  ----------------------------<  220<H>  4.1   |  25  |  0.97    Ca    9.3      18 Feb 2020 08:30  Phos  2.8     02-18  Mg     1.7     02-18                Culture - Blood (collected 16 Feb 2020 15:45)  Source: BLOOD PERIPHERAL  Preliminary Report (18 Feb 2020 15:44):    NO ORGANISMS ISOLATED    NO ORGANISMS ISOLATED AT 48 HRS.        RADIOLOGY & ADDITIONAL TESTS:  Results Reviewed:   Imaging Personally Reviewed:  Electrocardiogram Personally Reviewed:    COORDINATION OF CARE:  Care Discussed with Consultants/Other Providers [Y/N]:  Prior or Outpatient Records Reviewed [Y/N]:

## 2020-02-19 NOTE — PROGRESS NOTE ADULT - PROBLEM SELECTOR PLAN 6
- no clear infectious etiology at this time, CXR clear, UA negative  - blood cx NGTD  - monitor off abx for now

## 2020-02-20 LAB
CHOLEST SERPL-MCNC: 222 MG/DL — HIGH (ref 120–199)
GLUCOSE BLDC GLUCOMTR-MCNC: 143 MG/DL — HIGH (ref 70–99)
GLUCOSE BLDC GLUCOMTR-MCNC: 160 MG/DL — HIGH (ref 70–99)
GLUCOSE BLDC GLUCOMTR-MCNC: 174 MG/DL — HIGH (ref 70–99)
GLUCOSE BLDC GLUCOMTR-MCNC: 179 MG/DL — HIGH (ref 70–99)
HDLC SERPL-MCNC: 46 MG/DL — SIGNIFICANT CHANGE UP (ref 45–65)
LIPID PNL WITH DIRECT LDL SERPL: 155 MG/DL — SIGNIFICANT CHANGE UP
TRIGL SERPL-MCNC: 109 MG/DL — SIGNIFICANT CHANGE UP (ref 10–149)

## 2020-02-20 PROCEDURE — 99232 SBSQ HOSP IP/OBS MODERATE 35: CPT

## 2020-02-20 PROCEDURE — 99232 SBSQ HOSP IP/OBS MODERATE 35: CPT | Mod: GC

## 2020-02-20 RX ORDER — ATORVASTATIN CALCIUM 80 MG/1
20 TABLET, FILM COATED ORAL AT BEDTIME
Refills: 0 | Status: DISCONTINUED | OUTPATIENT
Start: 2020-02-20 | End: 2020-02-21

## 2020-02-20 RX ADMIN — Medication 75 MICROGRAM(S): at 05:51

## 2020-02-20 RX ADMIN — Medication 7 UNIT(S): at 12:53

## 2020-02-20 RX ADMIN — ENOXAPARIN SODIUM 40 MILLIGRAM(S): 100 INJECTION SUBCUTANEOUS at 11:50

## 2020-02-20 RX ADMIN — Medication 0.6 MILLIGRAM(S): at 11:50

## 2020-02-20 RX ADMIN — INSULIN GLARGINE 20 UNIT(S): 100 INJECTION, SOLUTION SUBCUTANEOUS at 22:42

## 2020-02-20 RX ADMIN — Medication 7 UNIT(S): at 17:59

## 2020-02-20 RX ADMIN — RISPERIDONE 0.5 MILLIGRAM(S): 4 TABLET ORAL at 21:25

## 2020-02-20 RX ADMIN — ESCITALOPRAM OXALATE 20 MILLIGRAM(S): 10 TABLET, FILM COATED ORAL at 11:50

## 2020-02-20 RX ADMIN — ATORVASTATIN CALCIUM 20 MILLIGRAM(S): 80 TABLET, FILM COATED ORAL at 21:25

## 2020-02-20 RX ADMIN — Medication 1: at 08:57

## 2020-02-20 RX ADMIN — Medication 7 UNIT(S): at 08:57

## 2020-02-20 RX ADMIN — DONEPEZIL HYDROCHLORIDE 10 MILLIGRAM(S): 10 TABLET, FILM COATED ORAL at 11:50

## 2020-02-20 RX ADMIN — Medication 1: at 17:59

## 2020-02-20 NOTE — PROGRESS NOTE ADULT - ATTENDING COMMENTS
Patient seen and examined, chart and labs reviewed. Case discussed with house staff.    74F with Alzheimer's disease admitted with inability to care for self at home found to have uncontrolled diabetes with hyperglycemia and leukocytosis of unclear etiology.  Endo recs appreciated for DM regimen, recommend basal/bolus regimen on discharge as patient is going to [facility]. Patient's family prefers dispo to rehab, placement is still pending.

## 2020-02-20 NOTE — PROGRESS NOTE ADULT - SUBJECTIVE AND OBJECTIVE BOX
***************************************************************  Trinh Benny, PGY1  Internal Medicine   pager: [phone]  ***************************************************************    PROGRESS NOTE:     Patient is a 74y old  Female who presents with a chief complaint of Failure to thrive (19 Feb 2020 15:44)      SUBJECTIVE / OVERNIGHT EVENTS: No acute overnight events. Pt without complaints this morning. AOx1 (to name only)     ADDITIONAL REVIEW OF SYSTEMS: Denies cp/f/c/abdominal pain/dysuria/sob.    MEDICATIONS  (STANDING):  colchicine 0.6 milliGRAM(s) Oral daily  dextrose 5%. 1000 milliLiter(s) (50 mL/Hr) IV Continuous <Continuous>  dextrose 50% Injectable 12.5 Gram(s) IV Push once  dextrose 50% Injectable 25 Gram(s) IV Push once  dextrose 50% Injectable 25 Gram(s) IV Push once  donepezil 10 milliGRAM(s) Oral daily  enoxaparin Injectable 40 milliGRAM(s) SubCutaneous daily  escitalopram 20 milliGRAM(s) Oral daily  insulin glargine Injectable (LANTUS) 20 Unit(s) SubCutaneous at bedtime  insulin lispro (HumaLOG) corrective regimen sliding scale   SubCutaneous three times a day before meals  insulin lispro (HumaLOG) corrective regimen sliding scale   SubCutaneous at bedtime  insulin lispro Injectable (HumaLOG) 7 Unit(s) SubCutaneous three times a day before meals  levothyroxine 75 MICROGram(s) Oral daily  risperiDONE   Tablet 0.5 milliGRAM(s) Oral at bedtime    MEDICATIONS  (PRN):  dextrose 40% Gel 15 Gram(s) Oral once PRN Blood Glucose LESS THAN 70 milliGRAM(s)/deciliter  glucagon  Injectable 1 milliGRAM(s) IntraMuscular once PRN Glucose LESS THAN 70 milligrams/deciliter      CAPILLARY BLOOD GLUCOSE      POCT Blood Glucose.: 174 mg/dL (20 Feb 2020 08:45)  POCT Blood Glucose.: 224 mg/dL (19 Feb 2020 22:26)  POCT Blood Glucose.: 147 mg/dL (19 Feb 2020 17:56)  POCT Blood Glucose.: 211 mg/dL (19 Feb 2020 13:05)    I&O's Summary      PHYSICAL EXAM:  Vital Signs Last 24 Hrs  T(C): 36.6 (20 Feb 2020 05:51), Max: 36.9 (19 Feb 2020 14:50)  T(F): 97.9 (20 Feb 2020 05:51), Max: 98.5 (19 Feb 2020 14:50)  HR: 65 (20 Feb 2020 05:51) (65 - 85)  BP: 143/64 (20 Feb 2020 05:51) (118/65 - 143/70)  BP(mean): --  RR: 17 (20 Feb 2020 05:51) (17 - 20)  SpO2: 95% (20 Feb 2020 05:51) (95% - 100%)    HEENT: NCAT.  PERRL.  EOMI.  No scleral icterus or injection.  Moist MM.     Neck: Supple.  Full ROM.  No JVD.   Heart: RRR.  Normal S1 and S2.  No murmurs   Lungs: CTAB. No wheezes   Abdomen: BS+, soft, NT/ND.    Skin: Warm and dry.  No rashes.  Extremities: No edema, clubbing, or cyanosis.  2+ peripheral pulses b/l.  Musculoskeletal: No spinal or paraspinal tenderness.  Neuro: A&Ox1 (to self, knows she is at a hospital), no focal neuro deficits    LABS:                      RADIOLOGY & ADDITIONAL TESTS:  Results Reviewed:   Imaging Personally Reviewed:  Electrocardiogram Personally Reviewed:    COORDINATION OF CARE:  Care Discussed with Consultants/Other Providers [Y/N]:  Prior or Outpatient Records Reviewed [Y/N]: ***************************************************************  Trinhlizett Zapata, PGY1  Internal Medicine   pager: [phone]  ***************************************************************    PROGRESS NOTE:     Patient is a 74y old  Female who presents with a chief complaint of Failure to thrive (19 Feb 2020 15:44)      SUBJECTIVE / OVERNIGHT EVENTS: Pt without complaints this morning. AOx2 (to name and location)     ADDITIONAL REVIEW OF SYSTEMS: Denies cp/f/c/abdominal pain/dysuria/sob.    MEDICATIONS  (STANDING):  colchicine 0.6 milliGRAM(s) Oral daily  dextrose 5%. 1000 milliLiter(s) (50 mL/Hr) IV Continuous <Continuous>  dextrose 50% Injectable 12.5 Gram(s) IV Push once  dextrose 50% Injectable 25 Gram(s) IV Push once  dextrose 50% Injectable 25 Gram(s) IV Push once  donepezil 10 milliGRAM(s) Oral daily  enoxaparin Injectable 40 milliGRAM(s) SubCutaneous daily  escitalopram 20 milliGRAM(s) Oral daily  insulin glargine Injectable (LANTUS) 20 Unit(s) SubCutaneous at bedtime  insulin lispro (HumaLOG) corrective regimen sliding scale   SubCutaneous three times a day before meals  insulin lispro (HumaLOG) corrective regimen sliding scale   SubCutaneous at bedtime  insulin lispro Injectable (HumaLOG) 7 Unit(s) SubCutaneous three times a day before meals  levothyroxine 75 MICROGram(s) Oral daily  risperiDONE   Tablet 0.5 milliGRAM(s) Oral at bedtime    MEDICATIONS  (PRN):  dextrose 40% Gel 15 Gram(s) Oral once PRN Blood Glucose LESS THAN 70 milliGRAM(s)/deciliter  glucagon  Injectable 1 milliGRAM(s) IntraMuscular once PRN Glucose LESS THAN 70 milligrams/deciliter      CAPILLARY BLOOD GLUCOSE      POCT Blood Glucose.: 174 mg/dL (20 Feb 2020 08:45)  POCT Blood Glucose.: 224 mg/dL (19 Feb 2020 22:26)  POCT Blood Glucose.: 147 mg/dL (19 Feb 2020 17:56)  POCT Blood Glucose.: 211 mg/dL (19 Feb 2020 13:05)    I&O's Summary      PHYSICAL EXAM:  Vital Signs Last 24 Hrs  T(C): 36.6 (20 Feb 2020 05:51), Max: 36.9 (19 Feb 2020 14:50)  T(F): 97.9 (20 Feb 2020 05:51), Max: 98.5 (19 Feb 2020 14:50)  HR: 65 (20 Feb 2020 05:51) (65 - 85)  BP: 143/64 (20 Feb 2020 05:51) (118/65 - 143/70)  BP(mean): --  RR: 17 (20 Feb 2020 05:51) (17 - 20)  SpO2: 95% (20 Feb 2020 05:51) (95% - 100%)    HEENT: NCAT.  PERRL.  EOMI.  No scleral icterus or injection.  Moist MM.     Neck: Supple.  Full ROM.  No JVD.   Heart: RRR.  Normal S1 and S2.  No murmurs   Lungs: CTAB. No wheezes   Abdomen: BS+, soft, NT/ND.    Skin: Warm and dry.  No rashes.  Extremities: No edema, clubbing, or cyanosis.  2+ peripheral pulses b/l.  Musculoskeletal: No spinal or paraspinal tenderness.  Neuro: A&Ox1 (to self, knows she is at a hospital), no focal neuro deficits    LABS:                      RADIOLOGY & ADDITIONAL TESTS:  Results Reviewed:   Imaging Personally Reviewed:  Electrocardiogram Personally Reviewed:    COORDINATION OF CARE:  Care Discussed with Consultants/Other Providers [Y/N]:  Prior or Outpatient Records Reviewed [Y/N]: ***************************************************************  Trinhlizett Zapata, PGY1  Internal Medicine   pager: [phone]  ***************************************************************    PROGRESS NOTE:     Patient is a 74y old  Female who presents with a chief complaint of Failure to thrive (19 Feb 2020 15:44)      SUBJECTIVE / OVERNIGHT EVENTS: Pt without complaints this morning. AOx2 (to name and location)     ADDITIONAL REVIEW OF SYSTEMS: Denies cp/f/c/abdominal pain/dysuria/sob.    MEDICATIONS  (STANDING):  colchicine 0.6 milliGRAM(s) Oral daily  dextrose 5%. 1000 milliLiter(s) (50 mL/Hr) IV Continuous <Continuous>  dextrose 50% Injectable 12.5 Gram(s) IV Push once  dextrose 50% Injectable 25 Gram(s) IV Push once  dextrose 50% Injectable 25 Gram(s) IV Push once  donepezil 10 milliGRAM(s) Oral daily  enoxaparin Injectable 40 milliGRAM(s) SubCutaneous daily  escitalopram 20 milliGRAM(s) Oral daily  insulin glargine Injectable (LANTUS) 20 Unit(s) SubCutaneous at bedtime  insulin lispro (HumaLOG) corrective regimen sliding scale   SubCutaneous three times a day before meals  insulin lispro (HumaLOG) corrective regimen sliding scale   SubCutaneous at bedtime  insulin lispro Injectable (HumaLOG) 7 Unit(s) SubCutaneous three times a day before meals  levothyroxine 75 MICROGram(s) Oral daily  risperiDONE   Tablet 0.5 milliGRAM(s) Oral at bedtime    MEDICATIONS  (PRN):  dextrose 40% Gel 15 Gram(s) Oral once PRN Blood Glucose LESS THAN 70 milliGRAM(s)/deciliter  glucagon  Injectable 1 milliGRAM(s) IntraMuscular once PRN Glucose LESS THAN 70 milligrams/deciliter      CAPILLARY BLOOD GLUCOSE      POCT Blood Glucose.: 174 mg/dL (20 Feb 2020 08:45)  POCT Blood Glucose.: 224 mg/dL (19 Feb 2020 22:26)  POCT Blood Glucose.: 147 mg/dL (19 Feb 2020 17:56)  POCT Blood Glucose.: 211 mg/dL (19 Feb 2020 13:05)    I&O's Summary      PHYSICAL EXAM:  Vital Signs Last 24 Hrs  T(C): 36.6 (20 Feb 2020 05:51), Max: 36.9 (19 Feb 2020 14:50)  T(F): 97.9 (20 Feb 2020 05:51), Max: 98.5 (19 Feb 2020 14:50)  HR: 65 (20 Feb 2020 05:51) (65 - 85)  BP: 143/64 (20 Feb 2020 05:51) (118/65 - 143/70)  BP(mean): --  RR: 17 (20 Feb 2020 05:51) (17 - 20)  SpO2: 95% (20 Feb 2020 05:51) (95% - 100%)    HEENT: NCAT.  PERRL.  EOMI.  No scleral icterus or injection.  Moist MM.     Neck: Supple.  Full ROM.  No JVD.   Heart: RRR.  Normal S1 and S2.  No murmurs   Lungs: CTAB. No wheezes   Abdomen: BS+, soft, NT/ND.    Skin: Warm and dry.  No rashes.  Extremities: No edema, clubbing, or cyanosis.  2+ peripheral pulses b/l.  Musculoskeletal: No spinal or paraspinal tenderness.  Neuro: A&Ox2 (to self, knows she is at a hospital), no focal neuro deficits    LABS:                      RADIOLOGY & ADDITIONAL TESTS:  Results Reviewed:   Imaging Personally Reviewed:  Electrocardiogram Personally Reviewed:    COORDINATION OF CARE:  Care Discussed with Consultants/Other Providers [Y/N]:  Prior or Outpatient Records Reviewed [Y/N]:

## 2020-02-20 NOTE — PROGRESS NOTE ADULT - PROBLEM SELECTOR PLAN 2
Uncontrolled DM w/ hyperglycemia   -  A1c 10.5  - increased lantus to 20u qhs, humalog 7u tid  - c/w moderate correction ISS  - endocrine recs appreciated   - dietician consult placed  - on oral metformin at home. Uncontrolled DM w/ hyperglycemia   -  A1c 10.5  - c/w lantus 20u qhs, humalog 7u tid  - c/w moderate correction ISS  - endocrine recs appreciated   - dietician consult placed  - on oral metformin at home.

## 2020-02-20 NOTE — PROGRESS NOTE ADULT - SUBJECTIVE AND OBJECTIVE BOX
Chief Complaint: DM2    History: Reports being hungry, asking for lunch tray.  No hypoglycemia noted.    MEDICATIONS  (STANDING):  colchicine 0.6 milliGRAM(s) Oral daily  dextrose 5%. 1000 milliLiter(s) (50 mL/Hr) IV Continuous <Continuous>  dextrose 50% Injectable 12.5 Gram(s) IV Push once  dextrose 50% Injectable 25 Gram(s) IV Push once  dextrose 50% Injectable 25 Gram(s) IV Push once  donepezil 10 milliGRAM(s) Oral daily  enoxaparin Injectable 40 milliGRAM(s) SubCutaneous daily  escitalopram 20 milliGRAM(s) Oral daily  insulin glargine Injectable (LANTUS) 20 Unit(s) SubCutaneous at bedtime  insulin lispro (HumaLOG) corrective regimen sliding scale   SubCutaneous three times a day before meals  insulin lispro (HumaLOG) corrective regimen sliding scale   SubCutaneous at bedtime  insulin lispro Injectable (HumaLOG) 7 Unit(s) SubCutaneous three times a day before meals  levothyroxine 75 MICROGram(s) Oral daily  risperiDONE   Tablet 0.5 milliGRAM(s) Oral at bedtime    MEDICATIONS  (PRN):  dextrose 40% Gel 15 Gram(s) Oral once PRN Blood Glucose LESS THAN 70 milliGRAM(s)/deciliter  glucagon  Injectable 1 milliGRAM(s) IntraMuscular once PRN Glucose LESS THAN 70 milligrams/deciliter      Allergies    eggs (Unknown)  penicillins (Unknown)    Intolerances      Review of Systems:    ALL OTHER SYSTEMS REVIEWED AND NEGATIVE      PHYSICAL EXAM:  VITALS: T(C): 36.6 (02-20-20 @ 05:51)  T(F): 97.9 (02-20-20 @ 05:51), Max: 98.5 (02-19-20 @ 14:50)  HR: 65 (02-20-20 @ 05:51) (65 - 85)  BP: 143/64 (02-20-20 @ 05:51) (118/65 - 143/70)  RR:  (17 - 20)  SpO2:  (95% - 100%)  Wt(kg): --  GENERAL: NAD, well-groomed, well-developed  EYES: No proptosis, no lid lag, anicteric  HEENT:  Atraumatic, Normocephalic, moist mucous membranes  PSYCH: Alert and oriented x 3, normal affect, normal mood      CAPILLARY BLOOD GLUCOSE      POCT Blood Glucose.: 143 mg/dL (20 Feb 2020 12:50)  POCT Blood Glucose.: 174 mg/dL (20 Feb 2020 08:45)  POCT Blood Glucose.: 224 mg/dL (19 Feb 2020 22:26)  POCT Blood Glucose.: 147 mg/dL (19 Feb 2020 17:56)      02-18    134<L>  |  96<L>  |  19  ----------------------------<  220<H>  4.1   |  25  |  0.97    EGFR if : 66  EGFR if non : 57    Ca    9.3      02-18  Mg     1.7     02-18  Phos  2.8     02-18            Thyroid Function Tests:  02-15 @ 17:47 TSH 3.61 FreeT4 -- T3 -- Anti TPO -- Anti Thyroglobulin Ab -- TSI --      Hemoglobin A1C, Whole Blood: 10.5 % <H> [4.0 - 5.6] (02-16-20 @ 07:00)

## 2020-02-20 NOTE — PROGRESS NOTE ADULT - PROBLEM SELECTOR PLAN 10
Transitions of Care Status:  1.  Name of PCP: Dr. Gray   2.  PCP Contacted on Admission: [X] Y    [ ] N Email sent to Dr. Gray  3.  PCP contacted at Discharge: [ ] Y    [ ] N    [ ] N/A  4.  Post-Discharge Appointment Date and Location:  5.  Summary of Handoff given to PCP:

## 2020-02-20 NOTE — PROGRESS NOTE ADULT - ASSESSMENT
74y woman with Alzheimer's disease, HTN, Hypothyroidism, Type 2 DM (A1C 10.5), Depression, gout, brought in by family member for decreased inability to take care of herself. Endocrine consulted for Type 2 DM with hyperglycemia.    1) Uncontrolled DM2  - target bg 100-200 mg/dl- no need for tight control given pt age, altered MS, and risk for hypoglycemia  - Continue Lantus  20 units sq qhs  - Continue Humalog  7 units sq tid ac  - c/w humalog low correction scale ac and hs    dc planning- with a1c 10.5%, and patient only on metformin 500 mg po daily, would maximize dose to metformin 1000 mg po BID plus dpp4 like tradjenta or januvia, if no contraindications.  Target a1c for this patient is 7-8%.  Can consider basal insulin plus PO regimen if family is willing to do daily insulin injections.  If patient is dc'd to LTC, can do basal/po vs basal bolus.  DC plan TBD     2) HLD-    Recommend start atorvastatin 20mg daily if no contraindications    3) HTN-  can monitor off BP meds for now.  If BP trends are high, would consider starting ACE i or ARB     4) Hypothyroid  c/w levothyroxine 75 micrograms po daily- give on an empty stomach, 30 min prior to meal

## 2020-02-21 ENCOUNTER — TRANSCRIPTION ENCOUNTER (OUTPATIENT)
Age: 75
End: 2020-02-21

## 2020-02-21 VITALS
DIASTOLIC BLOOD PRESSURE: 54 MMHG | SYSTOLIC BLOOD PRESSURE: 116 MMHG | OXYGEN SATURATION: 97 % | HEART RATE: 72 BPM | RESPIRATION RATE: 18 BRPM | TEMPERATURE: 99 F

## 2020-02-21 LAB
ANION GAP SERPL CALC-SCNC: 13 MMO/L — SIGNIFICANT CHANGE UP (ref 7–14)
BACTERIA BLD CULT: SIGNIFICANT CHANGE UP
BUN SERPL-MCNC: 26 MG/DL — HIGH (ref 7–23)
CALCIUM SERPL-MCNC: 9.4 MG/DL — SIGNIFICANT CHANGE UP (ref 8.4–10.5)
CHLORIDE SERPL-SCNC: 99 MMOL/L — SIGNIFICANT CHANGE UP (ref 98–107)
CO2 SERPL-SCNC: 26 MMOL/L — SIGNIFICANT CHANGE UP (ref 22–31)
CREAT SERPL-MCNC: 1.1 MG/DL — SIGNIFICANT CHANGE UP (ref 0.5–1.3)
GLUCOSE BLDC GLUCOMTR-MCNC: 134 MG/DL — HIGH (ref 70–99)
GLUCOSE BLDC GLUCOMTR-MCNC: 202 MG/DL — HIGH (ref 70–99)
GLUCOSE BLDC GLUCOMTR-MCNC: 340 MG/DL — HIGH (ref 70–99)
GLUCOSE SERPL-MCNC: 186 MG/DL — HIGH (ref 70–99)
MAGNESIUM SERPL-MCNC: 1.7 MG/DL — SIGNIFICANT CHANGE UP (ref 1.6–2.6)
PHOSPHATE SERPL-MCNC: 3.9 MG/DL — SIGNIFICANT CHANGE UP (ref 2.5–4.5)
POTASSIUM SERPL-MCNC: 4.2 MMOL/L — SIGNIFICANT CHANGE UP (ref 3.5–5.3)
POTASSIUM SERPL-SCNC: 4.2 MMOL/L — SIGNIFICANT CHANGE UP (ref 3.5–5.3)
SODIUM SERPL-SCNC: 138 MMOL/L — SIGNIFICANT CHANGE UP (ref 135–145)

## 2020-02-21 PROCEDURE — 99239 HOSP IP/OBS DSCHRG MGMT >30: CPT

## 2020-02-21 RX ORDER — INSULIN LISPRO 100/ML
0 VIAL (ML) SUBCUTANEOUS
Qty: 0 | Refills: 0 | DISCHARGE
Start: 2020-02-21

## 2020-02-21 RX ORDER — INSULIN LISPRO 100/ML
7 VIAL (ML) SUBCUTANEOUS
Qty: 0 | Refills: 0 | DISCHARGE
Start: 2020-02-21

## 2020-02-21 RX ORDER — ATORVASTATIN CALCIUM 80 MG/1
1 TABLET, FILM COATED ORAL
Qty: 0 | Refills: 0 | DISCHARGE
Start: 2020-02-21

## 2020-02-21 RX ORDER — ENOXAPARIN SODIUM 100 MG/ML
40 INJECTION SUBCUTANEOUS
Qty: 0 | Refills: 0 | DISCHARGE
Start: 2020-02-21

## 2020-02-21 RX ORDER — INSULIN GLARGINE 100 [IU]/ML
20 INJECTION, SOLUTION SUBCUTANEOUS
Qty: 0 | Refills: 0 | DISCHARGE
Start: 2020-02-21

## 2020-02-21 RX ORDER — METFORMIN HYDROCHLORIDE 850 MG/1
1 TABLET ORAL
Qty: 0 | Refills: 0 | DISCHARGE

## 2020-02-21 RX ADMIN — Medication 4: at 12:44

## 2020-02-21 RX ADMIN — DONEPEZIL HYDROCHLORIDE 10 MILLIGRAM(S): 10 TABLET, FILM COATED ORAL at 12:44

## 2020-02-21 RX ADMIN — ENOXAPARIN SODIUM 40 MILLIGRAM(S): 100 INJECTION SUBCUTANEOUS at 12:44

## 2020-02-21 RX ADMIN — Medication 7 UNIT(S): at 09:11

## 2020-02-21 RX ADMIN — Medication 2: at 09:11

## 2020-02-21 RX ADMIN — Medication 75 MICROGRAM(S): at 06:12

## 2020-02-21 RX ADMIN — ESCITALOPRAM OXALATE 20 MILLIGRAM(S): 10 TABLET, FILM COATED ORAL at 12:53

## 2020-02-21 RX ADMIN — Medication 7 UNIT(S): at 12:45

## 2020-02-21 RX ADMIN — Medication 0.6 MILLIGRAM(S): at 12:43

## 2020-02-21 NOTE — PROGRESS NOTE ADULT - NSHPATTENDINGPLANDISCUSS_GEN_ALL_CORE
HS
patient;  Dr. Zapata
HS, son at bedside, patient

## 2020-02-21 NOTE — DISCHARGE NOTE NURSING/CASE MANAGEMENT/SOCIAL WORK - PATIENT PORTAL LINK FT
You can access the FollowMyHealth Patient Portal offered by [facility] by registering at the following website: [email or web address]. By joining [facility]’s FollowMyHealth portal, you will also be able to view your health information using other applications (apps) compatible with our system.

## 2020-02-21 NOTE — PROGRESS NOTE ADULT - SUBJECTIVE AND OBJECTIVE BOX
***************************************************************  Ted Fraire PGY2  Internal Medicine   Pager: [phone]  [facility] in house pager: [phone]    Mon-Fri: pager covered by day team 7am-7pm;   *Academic conferences 12pm-1pm  Sa/Sun: see chart, primary physician assigned available 7am-12pm  Sat/[place] Coverage 12pm-7pm: [facility]- page [phone] for Team1-4, [facility] - pager forwarded to covering Resident  For Night coverage 7pm-7am: [facility]- page 1443 Team1-3, page [phone] Team4 & Care Model; [facility]- page [id number] for Team1-3, [phone] for Team4, Care Model  ***************************************************************    LANDRY RODRÍGUEZ  74y  MRN: [id number]    Patient is a 74y old  Female who presents with a chief complaint of Failure to thrive (20 Feb 2020 14:00)      Subjective:    MEDICATIONS  (STANDING):  atorvastatin 20 milliGRAM(s) Oral at bedtime  colchicine 0.6 milliGRAM(s) Oral daily  dextrose 5%. 1000 milliLiter(s) (50 mL/Hr) IV Continuous <Continuous>  dextrose 50% Injectable 12.5 Gram(s) IV Push once  dextrose 50% Injectable 25 Gram(s) IV Push once  dextrose 50% Injectable 25 Gram(s) IV Push once  donepezil 10 milliGRAM(s) Oral daily  enoxaparin Injectable 40 milliGRAM(s) SubCutaneous daily  escitalopram 20 milliGRAM(s) Oral daily  insulin glargine Injectable (LANTUS) 20 Unit(s) SubCutaneous at bedtime  insulin lispro (HumaLOG) corrective regimen sliding scale   SubCutaneous three times a day before meals  insulin lispro (HumaLOG) corrective regimen sliding scale   SubCutaneous at bedtime  insulin lispro Injectable (HumaLOG) 7 Unit(s) SubCutaneous three times a day before meals  levothyroxine 75 MICROGram(s) Oral daily  risperiDONE   Tablet 0.5 milliGRAM(s) Oral at bedtime    MEDICATIONS  (PRN):  dextrose 40% Gel 15 Gram(s) Oral once PRN Blood Glucose LESS THAN 70 milliGRAM(s)/deciliter  glucagon  Injectable 1 milliGRAM(s) IntraMuscular once PRN Glucose LESS THAN 70 milligrams/deciliter      Objective:    Vitals: Vital Signs Last 24 Hrs  T(C): 36.4 (02-21-20 @ 06:11), Max: 36.8 (02-20-20 @ 14:40)  T(F): 97.6 (02-21-20 @ 06:11), Max: 98.3 (02-20-20 @ 14:40)  HR: 64 (02-21-20 @ 06:11) (64 - 75)  BP: 138/51 (02-21-20 @ 06:11) (138/51 - 140/55)  BP(mean): --  RR: 17 (02-21-20 @ 06:11) (17 - 18)  SpO2: 97% (02-21-20 @ 06:11) (96% - 97%)              I&O's Summary    PHYSICAL EXAM:  HEENT: NCAT.  PERRL.  EOMI.  No scleral icterus or injection.  Moist MM.     Neck: Supple.  Full ROM.  No JVD.   Heart: RRR.  Normal S1 and S2.  No murmurs   Lungs: CTAB. No wheezes   Abdomen: BS+, soft, NT/ND.    Skin: Warm and dry.  No rashes.  Extremities: No edema, clubbing, or cyanosis.  2+ peripheral pulses b/l.  Musculoskeletal: No spinal or paraspinal tenderness.  Neuro: A&Ox2 (to self, knows she is at a hospital), no focal neuro deficits    LABS:                        11.9   9.78  )-----------( 270      ( 18 Feb 2020 08:30 )             36.3     02-18    134<L>  |  96<L>  |  19  ----------------------------<  220<H>  4.1   |  25  |  0.97    Ca    9.3      18 Feb 2020 08:30                        CAPILLARY BLOOD GLUCOSE      POCT Blood Glucose.: 179 mg/dL (20 Feb 2020 22:22)  POCT Blood Glucose.: 160 mg/dL (20 Feb 2020 17:56)  POCT Blood Glucose.: 143 mg/dL (20 Feb 2020 12:50)  POCT Blood Glucose.: 174 mg/dL (20 Feb 2020 08:45)    RADIOLOGY & ADDITIONAL TESTS:  Imaging Personally Reviewed:  [x ] YES  [ ] NO  Consultants involved in case:   Consultant(s) Notes Reviewed:  [ x] YES  [ ] NO:   Care Discussed with Consultants/Other Providers [x ] YES  [ ] NO ***************************************************************  Ted Fraire PGY2  Internal Medicine   Pager: [phone]  [facility] in house pager: [phone]    Mon-Fri: pager covered by day team 7am-7pm;   *Academic conferences 12pm-1pm  Sa/Sun: see chart, primary physician assigned available 7am-12pm  Sat/[place] Coverage 12pm-7pm: [facility]- page 1443 for Team1-4, [facility] - pager forwarded to covering Resident  For Night coverage 7pm-7am: [facility]- page 1443 Team1-3, page 1446 Team4 & Care Model; [facility]- page [phone] for Team1-3, [id number] for Team4, Care Model  ***************************************************************    LANDRY RODRÍGUEZ  74y  MRN: [id number]    Patient is a 74y old  Female who presents with a chief complaint of Failure to thrive (20 Feb 2020 14:00)      Subjective: no complaints this AM    MEDICATIONS  (STANDING):  atorvastatin 20 milliGRAM(s) Oral at bedtime  colchicine 0.6 milliGRAM(s) Oral daily  dextrose 5%. 1000 milliLiter(s) (50 mL/Hr) IV Continuous <Continuous>  dextrose 50% Injectable 12.5 Gram(s) IV Push once  dextrose 50% Injectable 25 Gram(s) IV Push once  dextrose 50% Injectable 25 Gram(s) IV Push once  donepezil 10 milliGRAM(s) Oral daily  enoxaparin Injectable 40 milliGRAM(s) SubCutaneous daily  escitalopram 20 milliGRAM(s) Oral daily  insulin glargine Injectable (LANTUS) 20 Unit(s) SubCutaneous at bedtime  insulin lispro (HumaLOG) corrective regimen sliding scale   SubCutaneous three times a day before meals  insulin lispro (HumaLOG) corrective regimen sliding scale   SubCutaneous at bedtime  insulin lispro Injectable (HumaLOG) 7 Unit(s) SubCutaneous three times a day before meals  levothyroxine 75 MICROGram(s) Oral daily  risperiDONE   Tablet 0.5 milliGRAM(s) Oral at bedtime    MEDICATIONS  (PRN):  dextrose 40% Gel 15 Gram(s) Oral once PRN Blood Glucose LESS THAN 70 milliGRAM(s)/deciliter  glucagon  Injectable 1 milliGRAM(s) IntraMuscular once PRN Glucose LESS THAN 70 milligrams/deciliter      Objective:    Vitals: Vital Signs Last 24 Hrs  T(C): 36.4 (02-21-20 @ 06:11), Max: 36.8 (02-20-20 @ 14:40)  T(F): 97.6 (02-21-20 @ 06:11), Max: 98.3 (02-20-20 @ 14:40)  HR: 64 (02-21-20 @ 06:11) (64 - 75)  BP: 138/51 (02-21-20 @ 06:11) (138/51 - 140/55)  BP(mean): --  RR: 17 (02-21-20 @ 06:11) (17 - 18)  SpO2: 97% (02-21-20 @ 06:11) (96% - 97%)              I&O's Summary    PHYSICAL EXAM:  HEENT: NCAT.  PERRL.  EOMI.  No scleral icterus or injection.  Moist MM.     Neck: Supple.  Full ROM.  No JVD.   Heart: RRR.  Normal S1 and S2.  No murmurs   Lungs: CTAB. No wheezes   Abdomen: BS+, soft, NT/ND.    Skin: Warm and dry.  No rashes.  Extremities: No edema, clubbing, or cyanosis.  2+ peripheral pulses b/l.  Musculoskeletal: No spinal or paraspinal tenderness.  Neuro: A&Ox2 (to self, knows she is at a hospital), no focal neuro deficits    LABS:                        11.9   9.78  )-----------( 270      ( 18 Feb 2020 08:30 )             36.3     02-18    134<L>  |  96<L>  |  19  ----------------------------<  220<H>  4.1   |  25  |  0.97    Ca    9.3      18 Feb 2020 08:30                        CAPILLARY BLOOD GLUCOSE      POCT Blood Glucose.: 179 mg/dL (20 Feb 2020 22:22)  POCT Blood Glucose.: 160 mg/dL (20 Feb 2020 17:56)  POCT Blood Glucose.: 143 mg/dL (20 Feb 2020 12:50)  POCT Blood Glucose.: 174 mg/dL (20 Feb 2020 08:45)    RADIOLOGY & ADDITIONAL TESTS:  Imaging Personally Reviewed:  [x ] YES  [ ] NO  Consultants involved in case:   Consultant(s) Notes Reviewed:  [ x] YES  [ ] NO:   Care Discussed with Consultants/Other Providers [x ] YES  [ ] NO

## 2020-02-21 NOTE — PROGRESS NOTE ADULT - PROBLEM SELECTOR PLAN 1
- p/w leukocytosis (15K), no bands, resolved, no clear source of infection. May be in setting of hyperglycemia.   - CXR: clear lungs  - UA bland, from clean catch, no UTI symptoms, Ucx contaminated   - TSH wnl  - per family, pt is at baseline mental status, no recent falls, no need for CT head   - PT consulted, fall precautions  - f/u Nutritional consult   - son prefers assisted living for upon discharge - p/w leukocytosis (15K), no bands, resolved, no clear source of infection. May be in the setting of hyperglycemia.   - CXR: clear lungs  - UA bland, from clean catch, no UTI symptoms, Ucx contaminated   - TSH wnl  - per family, pt is at baseline mental status, no recent falls, no need for CT head   - PT consulted, fall precautions  - being followed by nutrition  - son prefers assisted living/rehab for upon discharge

## 2020-02-21 NOTE — PROGRESS NOTE ADULT - ASSESSMENT
74y woman with Alzheimer's disease, HTN, Hypothyroidism, Depression, gout, brought in by family member for increased inability to take care of herself.  Found to have uncontrolled diabetes with hyperglycemia and leukocytosis of unclear etiology. 74y woman with Alzheimer's disease, HTN, Hypothyroidism, Depression, gout, brought in by family member for increased inability to take care of herself.  Found to have uncontrolled diabetes with hyperglycemia and leukocytosis of unclear etiology (now improved).

## 2020-02-21 NOTE — PROGRESS NOTE ADULT - REASON FOR ADMISSION
Failure to thrive

## 2020-02-21 NOTE — PROGRESS NOTE ADULT - PROBLEM SELECTOR PLAN 9
DVT prophylaxis: Lovenox   Diet: DM diet  Dispo: rehab DVT prophylaxis: Lovenox   Diet: DM diet  Dispo: rehab today

## 2020-02-21 NOTE — PROGRESS NOTE ADULT - PROBLEM SELECTOR PLAN 10
Transitions of Care Status:  1.  Name of PCP: Dr. Gray   2.  PCP Contacted on Admission: [X] Y    [ ] N Email sent to Dr. Gray  3.  PCP contacted at Discharge: [ ] Y    [ ] N    [ ] N/A  4.  Post-Discharge Appointment Date and Location:  5.  Summary of Handoff given to PCP: Transitions of Care Status:  1.  Name of PCP: Dr. Gray   2.  PCP Contacted on Admission: [X] Y    [ ] N Email sent to Dr. Gray  3.  PCP contacted at Discharge: [X ] Y    [ ] N    [ ] N/A  4.  Post-Discharge Appointment Date and Location:  5.  Summary of Handoff given to PCP: Emailed Dr. Gray and informed her of admission for worsening dementia and inability to take care of herself, as well as hyperglycemia. Discussed that patient is being discharged to rehab with goal for LTC as family is unable to care for her at home.

## 2020-02-21 NOTE — PROGRESS NOTE ADULT - ATTENDING COMMENTS
Patient seen and examined, chart and labs reviewed. Case discussed with house staff.    74F with Alzheimer's disease admitted with inability to care for self at home found to have uncontrolled diabetes with hyperglycemia and leukocytosis of unclear etiology.  Endo recs appreciated for DM regimen, recommend basal/bolus regimen on discharge as patient is going to [facility]/LTC. Patient is stable for discharge to rehab today.     33 minutes spent on discharge planning

## 2020-02-21 NOTE — PROGRESS NOTE ADULT - PROBLEM SELECTOR PLAN 2
Uncontrolled DM w/ hyperglycemia   -  A1c 10.5  - c/w lantus 20u qhs, humalog 7u tid  - c/w moderate correction ISS  - endocrine recs appreciated   - dietician consult placed  - on oral metformin at home. Uncontrolled DM w/ hyperglycemia   -  A1c 10.5  - c/w lantus 20u qhs, humalog 7u tid  - c/w moderate correction ISS  - endocrine recs appreciated   - dietician consult placed  - will resume basal bolus on dc to rehab  - on oral metformin at home.

## 2020-03-11 ENCOUNTER — APPOINTMENT (OUTPATIENT)
Dept: GERIATRICS | Facility: CLINIC | Age: 75
End: 2020-03-11

## 2020-07-13 ENCOUNTER — APPOINTMENT (OUTPATIENT)
Dept: NEUROLOGY | Facility: CLINIC | Age: 75
End: 2020-07-13
Payer: MEDICARE

## 2020-07-13 DIAGNOSIS — F32.9 MAJOR DEPRESSIVE DISORDER, SINGLE EPISODE, UNSPECIFIED: ICD-10-CM

## 2020-07-13 DIAGNOSIS — F41.9 ANXIETY DISORDER, UNSPECIFIED: ICD-10-CM

## 2020-07-13 PROCEDURE — 99214 OFFICE O/P EST MOD 30 MIN: CPT | Mod: 95

## 2020-07-13 RX ORDER — PEN NEEDLE, DIABETIC, SAFETY 30 GX3/16"
30G X 5 MM NEEDLE, DISPOSABLE MISCELLANEOUS
Qty: 100 | Refills: 0 | Status: COMPLETED | COMMUNITY
Start: 2020-07-03

## 2020-07-13 RX ORDER — ATORVASTATIN CALCIUM 20 MG/1
20 TABLET, FILM COATED ORAL
Qty: 30 | Refills: 0 | Status: COMPLETED | COMMUNITY
Start: 2020-07-03

## 2020-07-13 RX ORDER — AZITHROMYCIN 250 MG/1
250 TABLET, FILM COATED ORAL
Qty: 6 | Refills: 0 | Status: COMPLETED | COMMUNITY
Start: 2020-04-03

## 2020-07-13 RX ORDER — LANCETS 28 GAUGE
EACH MISCELLANEOUS
Qty: 50 | Refills: 0 | Status: COMPLETED | COMMUNITY
Start: 2020-04-13

## 2020-07-13 RX ORDER — SYRING-NEEDL,DISP,INSUL,0.3 ML 31 GX5/16"
31G X 5/16" SYRINGE, EMPTY DISPOSABLE MISCELLANEOUS
Qty: 100 | Refills: 0 | Status: COMPLETED | COMMUNITY
Start: 2020-03-27

## 2020-07-13 RX ORDER — INSULIN GLARGINE 100 [IU]/ML
100 INJECTION, SOLUTION SUBCUTANEOUS
Qty: 15 | Refills: 0 | Status: COMPLETED | COMMUNITY
Start: 2020-04-21

## 2020-07-13 NOTE — ASSESSMENT
[FreeTextEntry1] : Assessment:\par 73yo RH WW, with MCI, with recent progression to mild dementia.\par Now in AL.\par Anxiety is a component. \par \par Impression:\par Mild AD.\par Depression.\par \par Plan:\par -Hold memantine for 2-3 weeks to see if anxiety improves\par -Continue Lexapro at current dose\par -Encourage activity. \par \par A thorough discussion was entertained with the patient/caregiver regarding the use of psychoactive medications, their possible benefits and AE profile, including the risk of cardiovascular complications.\par We discussed the benefits of being active, physically and mentally, and the need to to establish a routine in this respect.\par Driving abilities and firearms possession and use were discussed, in relation to progression of the cognitive decline, and the need to assess them periodically.\par Patient/caregiver understand and agree with the plan.

## 2020-07-13 NOTE — HISTORY OF PRESENT ILLNESS
[Other Location: e.g. School (Enter Location, City,State)___] : at [unfilled], at the time of the visit. [Other Location: e.g. Home (Enter Location, City,State)___] : at [unfilled] [Other:____] : [unfilled] [FreeTextEntry1] : HPI-Interval Hx 20200713:\par Pt declined in Feb 2020, then was admitted to hospital for pain and disorientation, A1c 10.5. Thereafter, she was moved into AL, where she would get more attention and activity.\par Per family her anxiety ie a bit better, but they are unable to see her too often. Per Raquel, SW at AL, she is quite anxious, would go to her and ask about what is going on and needs to be calmed down often.\par Appetite is excellent and sleep is good.\par Rest is stable. \par \par HPI-Interval Hx 20200113:\par pt here after 11 months.\par A few changes in her meds, off Seroquel and Mirtazapine (gained weight) and started on Risperidone and Lexapro.\par Sleep: Tends to stay up late and sleeps in the day. \par Apathy +.\par [facility] 2[facility].\par ADL have been declining, with more need for supervision and prompting.\par IADL poor.\par Appetite ok.\par per son, she is more disoriented and her STM is much poorer. She goes to the day center and has a good time. \par \par HPI-Interval Hx [id number]:\par She was just in hospital and rehab due to gout pain, now better. \par Per son, she is getting a bit worse, with STM.\par Senior center 2/week, bus picks her up.\par She is well monitored by family. They keep an eye on food and meds. \par She still lives alone.\par Psychiatrist planning to restart her on SSRI, not clear which one.\par \par HPI-Interval Hx [id number]:\par MRI and nPT indicative of MCI/AD.\par Stopped driving.\par Still not decided about clinical trials, here with her son, and would like to participate.\par Per son, she has worsened a lot in these few months. Mostly the short term memory.\par \par PMH:\par 73yo RH WW, with HTN, DM, hypothyroid and depression, here with her son for possible dementia.\par Pt has suffered from depression for a long time, and tried several different medications. She was following with Psych but has recently lost follow-up.\par For a few years, 1-2 max, family and pt has noted STM issues, gradually progressive. \par She has recently retired from her secretarial job, and is going back to work soon.\par Memory: mostly repeating things over and over. Not really affecting her life and job.\par Speech: ok.\par There appear not to be any frontal issues. \par Sleep: ok, now better on Mirtazapine. No snoring.\par Appetite: little appetite, forgets to eat. no recent weight loss.\par Few accidental falls in last years, has forgotten, possibly due to knee pain.\par Mood: c/o anxiety and depression, has used several different meds in the past, recently was on Paroxetine, stopped with no reason; has abused Xanax in the past, not taking now. Unclear why she is on Seroquel.\par ADl and IADL intact. Drives, no issues.

## 2020-07-13 NOTE — PHYSICAL EXAM
[General Appearance - Alert] : alert [General Appearance - Well Nourished] : well nourished [General Appearance - In No Acute Distress] : in no acute distress [General Appearance - Well Developed] : well developed [Mood] : the mood was normal [Affect] : the affect was normal [Impaired Insight] : insight and judgment were intact [Over the Past 2 Weeks, Have You Felt Down, Depressed, or Hopeless?] : 1.) Over the past 2 weeks, have you felt down, depressed, or hopeless? Yes [Over the Past 2 Weeks, Have You Felt Little Interest or Pleasure Doing Things?] : 2.) Over the past 2 weeks, have you felt little interest or pleasure doing things? Yes [Person] : oriented to person [Registration Intact] : recent registration memory intact [Remote Intact] : remote memory intact [Span Intact] : the attention span was normal [Visual Intact] : visual attention was ~T not ~L decreased [Naming Objects] : no difficulty naming common objects [Concentration Intact] : normal concentrating ability [Fluency] : fluency intact [Reading] : reading intact [Comprehension] : comprehension intact [Past History] : adequate knowledge of personal past history [Vocabulary] : adequate range of vocabulary [Total Score ___ / 30] : the patient achieved a score of [unfilled] /30 [Registration ___ / 3] : registration [unfilled] / 3 [Place ___ / 5] : place [unfilled] / 5 [Date / Time ___ / 5] : date / time [unfilled] / 5 [Naming 2 Objects ___ / 2] : naming two objects [unfilled] / 2 [Serial Sevens ___/5] : serial sevens [unfilled] / 5 [Repeating a Sentence ___ / 1] : repeating a sentence [unfilled] / 1 [3-stage Verbal Command ___ / 3] : three-stage verbal command [unfilled] / 3 [Writing a Sentence ___ / 1] : write sentence [unfilled] / 1 [Written Command ___ / 1] : written command [unfilled] / 1 [Copy a Design ___ / 1] : copy a design [unfilled] / 1 [Recall ___ / 3] : recall [unfilled] / 3 [Cranial Nerves Trigeminal (V)] : facial sensation intact symmetrically [Cranial Nerves Optic (II)] : visual acuity intact bilaterally,  visual fields full to confrontation, pupils equal round and reactive to light [Cranial Nerves Oculomotor (III)] : extraocular motion intact [Cranial Nerves Facial (VII)] : face symmetrical [Cranial Nerves Vestibulocochlear (VIII)] : hearing was intact bilaterally [Cranial Nerves Hypoglossal (XII)] : there was no tongue deviation with protrusion [Cranial Nerves Accessory (XI - Cranial And Spinal)] : head turning and shoulder shrug symmetric [Motor Strength] : muscle strength was normal in all four extremities [Involuntary Movements] : no involuntary movements were seen [Motor Handedness Right-Handed] : the patient is right hand dominant [Sensation Tactile Decrease] : light touch was intact [Balance] : balance was intact [Sclera] : the sclera and conjunctiva were normal [Extraocular Movements] : extraocular movements were intact [Full Visual Field] : full visual field [Outer Ear] : the ears and nose were normal in appearance [Neck Appearance] : the appearance of the neck was normal [Arterial Pulses Carotid] : carotid pulses were normal with no bruits [Skin Color & Pigmentation] : normal skin color and pigmentation [Skin Turgor] : normal skin turgor [] : no rash [Short Term Intact] : short term memory impaired [Time] : disoriented to time [Place] : disoriented to place [Repeating Phrases] : difficulty repeating a phrase [Writing A Sentence] : difficulty writing a sentence [Current Events] : inadequate knowledge of current events [Romberg's Sign] : Romberg's sign was negtive [Motor Strength Lower Extremities Bilaterally] : strength was normal in both lower extremities [Motor Strength Upper Extremities Bilaterally] : strength was normal in both upper extremities [Dysesthesia] : no dysesthesia [Allodynia] : no ~T allodynia present [Limited Balance] : balance was intact [Hyperesthesia] : no hyperesthesia [Past-pointing] : there was no past-pointing [Tremor] : no tremor present [Dysdiadochokinesia Bilaterally] : not present [Coordination - Dysmetria Impaired Finger-to-Nose Bilateral] : not present [Coordination - Dysmetria Impaired Heel-to-Shin Bilateral] : not present [Plantar Reflex Left Only] : normal on the left [Plantar Reflex Right Only] : normal on the right [FreeTextEntry8] : walks leaning on walker, but with good stride and pace. [FreeTextEntry4] : Exam limited. [FreeTextEntry1] : Gait as in neuro exam.

## 2020-09-17 NOTE — DISCHARGE NOTE PROVIDER - NSDCCPCAREPLAN_GEN_ALL_CORE_FT
Admitted s/p lami PRINCIPAL DISCHARGE DIAGNOSIS  Diagnosis: Failure to thrive in adult  Assessment and Plan of Treatment: You were admitted to the hospital for increasing difficulty completing daily chores and increased forgetfulness. It was recommended that you go to an assisted living facility to receive extra help with these tasks. PRINCIPAL DISCHARGE DIAGNOSIS  Diagnosis: Failure to thrive in adult  Assessment and Plan of Treatment: You were admitted to the hospital for increasing difficulty completing daily chores and increased forgetfulness. It was recommended that you go to an assisted living facility to receive extra help with these tasks.      SECONDARY DISCHARGE DIAGNOSES  Diagnosis: Type 2 diabetes mellitus  Assessment and Plan of Treatment: You were found to have uncontrolled diabetes and high blood sugars. You were started on insulin by the endocrinologists. While at rehab or long term facility, please continue to take insulin as prescribed.

## 2020-11-02 ENCOUNTER — APPOINTMENT (OUTPATIENT)
Dept: NEUROLOGY | Facility: CLINIC | Age: 75
End: 2020-11-02

## 2020-11-22 NOTE — PROGRESS NOTE ADULT - PROBLEM SELECTOR PROBLEM 10
Discharge planning issues
Medications

## 2020-12-03 NOTE — PATIENT PROFILE ADULT - MONEY FOR FOOD
Mucosal Advancement Flap Text: Given the location of the defect, shape of the defect and the proximity to free margins a mucosal advancement flap was deemed most appropriate. Incisions were made with a 15 blade scalpel in the appropriate fashion along the cutaneous vermilion border and the mucosal lip. The remaining actinically damaged mucosal tissue was excised.  The mucosal advancement flap was then elevated to the gingival sulcus with care taken to preserve the neurovascular structures and advanced into the primary defect. Care was taken to ensure that precise realignment of the vermilion border was achieved. no

## 2020-12-28 ENCOUNTER — NON-APPOINTMENT (OUTPATIENT)
Age: 75
End: 2020-12-28

## 2020-12-28 ENCOUNTER — APPOINTMENT (OUTPATIENT)
Dept: NEUROLOGY | Facility: CLINIC | Age: 75
End: 2020-12-28

## 2020-12-30 RX ORDER — RISPERIDONE 0.5 MG/1
0.5 TABLET, FILM COATED ORAL DAILY
Refills: 0 | Status: DISCONTINUED | COMMUNITY
Start: 2020-01-13 | End: 2020-12-30

## 2020-12-30 RX ORDER — MEMANTINE HYDROCHLORIDE 7 MG/1
7 CAPSULE, EXTENDED RELEASE ORAL
Qty: 90 | Refills: 3 | Status: DISCONTINUED | OUTPATIENT
Start: 2020-01-13 | End: 2020-12-30

## 2021-01-19 DIAGNOSIS — E78.5 HYPERLIPIDEMIA, UNSPECIFIED: ICD-10-CM

## 2021-01-19 RX ORDER — ATORVASTATIN CALCIUM 20 MG/1
20 TABLET, FILM COATED ORAL
Qty: 30 | Refills: 0 | Status: ACTIVE | COMMUNITY
Start: 2021-01-19

## 2021-02-24 ENCOUNTER — NON-APPOINTMENT (OUTPATIENT)
Age: 76
End: 2021-02-24

## 2021-03-01 ENCOUNTER — APPOINTMENT (OUTPATIENT)
Dept: NEUROLOGY | Facility: CLINIC | Age: 76
End: 2021-03-01

## 2021-03-01 DIAGNOSIS — R46.89 OTHER SYMPTOMS AND SIGNS INVOLVING APPEARANCE AND BEHAVIOR: ICD-10-CM

## 2021-03-15 ENCOUNTER — RX CHANGE (OUTPATIENT)
Age: 76
End: 2021-03-15

## 2021-03-17 ENCOUNTER — RX CHANGE (OUTPATIENT)
Age: 76
End: 2021-03-17

## 2021-03-31 ENCOUNTER — RX RENEWAL (OUTPATIENT)
Age: 76
End: 2021-03-31

## 2021-04-19 ENCOUNTER — APPOINTMENT (OUTPATIENT)
Dept: NEUROLOGY | Facility: CLINIC | Age: 76
End: 2021-04-19
Payer: MEDICARE

## 2021-04-19 VITALS
SYSTOLIC BLOOD PRESSURE: 133 MMHG | HEART RATE: 70 BPM | HEIGHT: 60 IN | DIASTOLIC BLOOD PRESSURE: 68 MMHG | WEIGHT: 195 LBS | BODY MASS INDEX: 38.28 KG/M2

## 2021-04-19 VITALS — TEMPERATURE: 97.7 F

## 2021-04-19 DIAGNOSIS — F02.80 ALZHEIMER'S DISEASE, UNSPECIFIED: ICD-10-CM

## 2021-04-19 DIAGNOSIS — R45.1 RESTLESSNESS AND AGITATION: ICD-10-CM

## 2021-04-19 DIAGNOSIS — G30.9 ALZHEIMER'S DISEASE, UNSPECIFIED: ICD-10-CM

## 2021-04-19 PROCEDURE — 99214 OFFICE O/P EST MOD 30 MIN: CPT

## 2021-04-19 RX ORDER — OLANZAPINE 5 MG/1
5 TABLET, FILM COATED ORAL DAILY
Qty: 45 | Refills: 3 | Status: ACTIVE | COMMUNITY
Start: 2021-03-01 | End: 1900-01-01

## 2021-04-19 NOTE — HISTORY OF PRESENT ILLNESS
[FreeTextEntry1] : HPI-Interval Hx [id number]:\par Pt here with son.\par Some more agitation in the last few months.\par Started olanzapine 2.5mg BID, and seems to be better, though at times still aggressive. \par Sleeps ok.\par Appetite ok, she is eating better now.\par She does not seem to be depressed, mostly in good disposition.\par \par \par HPI-Interval Hx [id number]:\par Pt declined in Feb 2020, then was admitted to hospital for pain and disorientation, A1c 10.5. Thereafter, she was moved into [place], where she would get more attention and activity.\par Per family her anxiety is a bit better, but they are unable to see her too often. Per Raquel, [facility] at [facility], she is quite anxious, would go to her and ask about what is going on and needs to be calmed down often.\par Appetite is excellent and sleep is good.\par Rest is stable. \par \par HPI-Interval Hx [id number]:\par pt here after 11 months.\par A few changes in her meds, off Seroquel and Mirtazapine (gained weight) and started on Risperidone and Lexapro.\par Sleep: Tends to stay up late and sleeps in the day. \par [facility] +.\par [facility] 2[facility].\par ADL have been declining, with more need for supervision and prompting.\par IADL poor.\par Appetite ok.\par per son, she is more disoriented and her STM is much poorer. She goes to the day center and has a good time. \par \par HPI-Interval Hx [id number]:\par She was just in hospital and rehab due to gout pain, now better. \par Per son, she is getting a bit worse, with STM.\par Senior center 2/week, bus picks her up.\par She is well monitored by family. They keep an eye on food and meds. \par She still lives alone.\par Psychiatrist planning to restart her on SSRI, not clear which one.\par \par HPI-Interval Hx [id number]:\par MRI and nPT indicative of MCI/AD.\par Stopped driving.\par Still not decided about clinical trials, here with her son, and would like to participate.\par Per son, she has worsened a lot in these few months. Mostly the short term memory.\par \par PMH:\par 71yo RH WW, with HTN, DM, hypothyroid and depression, here with her son for possible dementia.\par Pt has suffered from depression for a long time, and tried several different medications. She was following with Psych but has recently lost follow-up.\par For a few years, 1-2 max, family and pt has noted STM issues, gradually progressive. \par She has recently retired from her secretarial job, and is going back to work soon.\par Memory: mostly repeating things over and over. Not really affecting her life and job.\par Speech: ok.\par There appear not to be any frontal issues. \par Sleep: ok, now better on Mirtazapine. No snoring.\par Appetite: little appetite, forgets to eat. no recent weight loss.\par Few accidental falls in last years, has forgotten, possibly due to knee pain.\par Mood: c/o anxiety and depression, has used several different meds in the past, recently was on Paroxetine, stopped with no reason; has abused Xanax in the past, not taking now. Unclear why she is on Seroquel.\par ADl and IADL intact. Drives, no issues.

## 2021-04-19 NOTE — PHYSICAL EXAM
[General Appearance - Alert] : alert [General Appearance - In No Acute Distress] : in no acute distress [General Appearance - Well Nourished] : well nourished [General Appearance - Well Developed] : well developed [Impaired Insight] : insight and judgment were intact [Affect] : the affect was normal [Mood] : the mood was normal [Over the Past 2 Weeks, Have You Felt Down, Depressed, or Hopeless?] : 1.) Over the past 2 weeks, have you felt down, depressed, or hopeless? Yes [Over the Past 2 Weeks, Have You Felt Little Interest or Pleasure Doing Things?] : 2.) Over the past 2 weeks, have you felt little interest or pleasure doing things? Yes [Person] : oriented to person [Remote Intact] : remote memory intact [Registration Intact] : recent registration memory intact [Span Intact] : the attention span was normal [Concentration Intact] : normal concentrating ability [Visual Intact] : visual attention was ~T not ~L decreased [Naming Objects] : no difficulty naming common objects [Fluency] : fluency intact [Comprehension] : comprehension intact [Reading] : reading intact [Past History] : adequate knowledge of personal past history [Vocabulary] : adequate range of vocabulary [Total Score ___ / 30] : the patient achieved a score of [unfilled] /30 [Date / Time ___ / 5] : date / time [unfilled] / 5 [Place ___ / 5] : place [unfilled] / 5 [Registration ___ / 3] : registration [unfilled] / 3 [Serial Sevens ___/5] : serial sevens [unfilled] / 5 [Naming 2 Objects ___ / 2] : naming two objects [unfilled] / 2 [Repeating a Sentence ___ / 1] : repeating a sentence [unfilled] / 1 [Writing a Sentence ___ / 1] : write sentence [unfilled] / 1 [3-stage Verbal Command ___ / 3] : three-stage verbal command [unfilled] / 3 [Written Command ___ / 1] : written command [unfilled] / 1 [Copy a Design ___ / 1] : copy a design [unfilled] / 1 [Recall ___ / 3] : recall [unfilled] / 3 [Cranial Nerves Optic (II)] : visual acuity intact bilaterally,  visual fields full to confrontation, pupils equal round and reactive to light [Cranial Nerves Oculomotor (III)] : extraocular motion intact [Cranial Nerves Trigeminal (V)] : facial sensation intact symmetrically [Cranial Nerves Facial (VII)] : face symmetrical [Cranial Nerves Vestibulocochlear (VIII)] : hearing was intact bilaterally [Cranial Nerves Accessory (XI - Cranial And Spinal)] : head turning and shoulder shrug symmetric [Cranial Nerves Hypoglossal (XII)] : there was no tongue deviation with protrusion [Motor Strength] : muscle strength was normal in all four extremities [Involuntary Movements] : no involuntary movements were seen [Motor Handedness Right-Handed] : the patient is right hand dominant [Sensation Tactile Decrease] : light touch was intact [Balance] : balance was intact [1+] : Ankle jerk left 1+ [Sclera] : the sclera and conjunctiva were normal [Extraocular Movements] : extraocular movements were intact [Full Visual Field] : full visual field [Outer Ear] : the ears and nose were normal in appearance [Neck Appearance] : the appearance of the neck was normal [Heart Rate And Rhythm] : heart rate was normal and rhythm regular [Heart Sounds] : normal S1 and S2 [Heart Sounds Gallop] : no gallops [Murmurs] : no murmurs [Heart Sounds Pericardial Friction Rub] : no pericardial rub [Arterial Pulses Carotid] : carotid pulses were normal with no bruits [Bowel Sounds] : normal bowel sounds [Abdomen Soft] : soft [Abdomen Tenderness] : non-tender [Abdomen Mass (___ Cm)] : no abdominal mass palpated [No CVA Tenderness] : no ~M costovertebral angle tenderness [No Spinal Tenderness] : no spinal tenderness [Nail Clubbing] : no clubbing  or cyanosis of the fingernails [Musculoskeletal - Swelling] : no joint swelling seen [Motor Tone] : muscle strength and tone were normal [Skin Color & Pigmentation] : normal skin color and pigmentation [Skin Turgor] : normal skin turgor [] : no rash [Place] : disoriented to place [Time] : disoriented to time [Short Term Intact] : short term memory impaired [Repeating Phrases] : difficulty repeating a phrase [Writing A Sentence] : difficulty writing a sentence [Current Events] : inadequate knowledge of current events [Motor Strength Upper Extremities Bilaterally] : strength was normal in both upper extremities [Motor Strength Lower Extremities Bilaterally] : strength was normal in both lower extremities [Romberg's Sign] : Romberg's sign was negtive [Allodynia] : no ~T allodynia present [Dysesthesia] : no dysesthesia [Hyperesthesia] : no hyperesthesia [Limited Balance] : balance was intact [Past-pointing] : there was no past-pointing [Tremor] : no tremor present [Dysdiadochokinesia Bilaterally] : not present [Coordination - Dysmetria Impaired Finger-to-Nose Bilateral] : not present [Coordination - Dysmetria Impaired Heel-to-Shin Bilateral] : not present [Plantar Reflex Right Only] : normal on the right [Plantar Reflex Left Only] : normal on the left [FreeTextEntry4] : Exam limited. [FreeTextEntry8] : walks with walker, but with good stride and pace. [FreeTextEntry1] : Gait as in neuro exam.

## 2021-04-19 NOTE — ASSESSMENT
[FreeTextEntry1] : Assessment:\par 76yo RH WW, with AD dementia.\par Now in [place].\par Some behavioral issues lately, tends to be verbally aggressive along the day.\par Sleep and appetite stable.\par \par Impression:\par -Mild AD.\par -agitation.\par \par Plan:\par -Continue Lexapro at current dose\par -Olanzapine to 5+2.5mg daily. \par -Encourage activity. \par \par A thorough discussion was entertained with the patient/caregiver regarding the use of psychoactive medications, their possible benefits and AE profile, including the risk of cardiovascular complications.\par We discussed the benefits of being active, physically and mentally, and the need to to establish a routine in this respect.\par Driving abilities and firearms possession and use were discussed, in relation to progression of the cognitive decline, and the need to assess them periodically.\par Patient/caregiver understand and agree with the plan.

## 2021-04-21 NOTE — DISCHARGE NOTE ADULT - CARE PROVIDER_API CALL
ifrah all pertinent systems normal Zachary Bear), Internal Medicine; Nephrology  [address] [place] [address]  Phone: [phone]  Fax: [phone]

## 2021-09-10 NOTE — DATA REVIEWED
Left a message for patient on mobile #. Please message Beau AUGUST when patient returns call.     [de-identified] : I reviewed the NPT and MRI brain on EHR.

## 2021-10-09 NOTE — ED ADULT NURSE NOTE - FACIAL SYMMETRY
Patient with malignancy related hypercalcemia.  1. Hypercalcemia - received calcitonin and pamidronate.  Continue with fluids, etc. as outlined above. Can consider steroids per response. symmetrical

## 2022-07-05 NOTE — ED ADULT NURSE REASSESSMENT NOTE - GENERAL PATIENT STATE
cooperative/resting/sleeping/family/SO at bedside/comfortable appearance/improvement verbalized/smiling/interactive (E4) spontaneous

## 2023-03-30 NOTE — PATIENT PROFILE ADULT - OVER THE PAST TWO WEEKS HAVE YOU FELT DOWN, DEPRESSED OR HOPELESS?
What Type Of Note Output Would You Prefer (Optional)?: Standard Output How Severe Is Your Skin Lesion?: moderate Has Your Skin Lesion Been Treated?: not been treated Is This A New Presentation, Or A Follow-Up?: Skin Lesion yes

## 2024-09-02 NOTE — PROGRESS NOTE ADULT - PROBLEM SELECTOR PLAN 4
- mild hyponatremia, Na+ 129, corrects to 134 accounting for glucose  - s/p 1L of NS in ED, continue with maintenance fluid @100ml/hr   - repeat BMP in AM - Resolved  - mild hyponatremia on admission, Na+ 129, secondary to hyperglycemia Yes

## 2025-07-28 NOTE — ED PROVIDER NOTE - CADM POA PRESS ULCER
Please make a copy for chart and get completed form to Isaura.   Thank you.   Kristen Kehr PA-C     No